# Patient Record
Sex: FEMALE | Race: WHITE | NOT HISPANIC OR LATINO | Employment: FULL TIME | URBAN - METROPOLITAN AREA
[De-identification: names, ages, dates, MRNs, and addresses within clinical notes are randomized per-mention and may not be internally consistent; named-entity substitution may affect disease eponyms.]

---

## 2017-01-11 ENCOUNTER — OFFICE VISIT - HEALTHEAST (OUTPATIENT)
Dept: PODIATRY | Facility: CLINIC | Age: 37
End: 2017-01-11

## 2017-01-11 DIAGNOSIS — G57.60 PLANTAR NERVE LESION, UNSPECIFIED LATERALITY: ICD-10-CM

## 2017-01-18 ENCOUNTER — OFFICE VISIT - HEALTHEAST (OUTPATIENT)
Dept: PODIATRY | Facility: CLINIC | Age: 37
End: 2017-01-18

## 2017-01-18 DIAGNOSIS — G57.60 MORTON'S NEUROMA, UNSPECIFIED LATERALITY: ICD-10-CM

## 2017-01-31 ENCOUNTER — RECORDS - HEALTHEAST (OUTPATIENT)
Dept: ADMINISTRATIVE | Facility: OTHER | Age: 37
End: 2017-01-31

## 2017-02-01 ENCOUNTER — OFFICE VISIT - HEALTHEAST (OUTPATIENT)
Dept: PODIATRY | Facility: CLINIC | Age: 37
End: 2017-02-01

## 2017-02-01 DIAGNOSIS — G57.60 PLANTAR NERVE LESION, UNSPECIFIED LATERALITY: ICD-10-CM

## 2017-02-03 ENCOUNTER — COMMUNICATION - HEALTHEAST (OUTPATIENT)
Dept: FAMILY MEDICINE | Facility: CLINIC | Age: 37
End: 2017-02-03

## 2017-02-03 ENCOUNTER — OFFICE VISIT - HEALTHEAST (OUTPATIENT)
Dept: FAMILY MEDICINE | Facility: CLINIC | Age: 37
End: 2017-02-03

## 2017-02-03 DIAGNOSIS — R39.14 FEELING OF INCOMPLETE BLADDER EMPTYING: ICD-10-CM

## 2017-02-03 DIAGNOSIS — R30.0 DYSURIA: ICD-10-CM

## 2017-02-04 ENCOUNTER — COMMUNICATION - HEALTHEAST (OUTPATIENT)
Dept: FAMILY MEDICINE | Facility: CLINIC | Age: 37
End: 2017-02-04

## 2017-08-24 ENCOUNTER — OFFICE VISIT - HEALTHEAST (OUTPATIENT)
Dept: FAMILY MEDICINE | Facility: CLINIC | Age: 37
End: 2017-08-24

## 2017-08-24 DIAGNOSIS — J02.9 ACUTE PHARYNGITIS: ICD-10-CM

## 2017-10-19 ENCOUNTER — COMMUNICATION - HEALTHEAST (OUTPATIENT)
Dept: FAMILY MEDICINE | Facility: CLINIC | Age: 37
End: 2017-10-19

## 2017-10-19 DIAGNOSIS — J30.9 ALLERGIC RHINITIS: ICD-10-CM

## 2017-10-25 ENCOUNTER — COMMUNICATION - HEALTHEAST (OUTPATIENT)
Dept: FAMILY MEDICINE | Facility: CLINIC | Age: 37
End: 2017-10-25

## 2017-10-25 DIAGNOSIS — J30.9 ALLERGIC RHINITIS: ICD-10-CM

## 2017-10-27 ENCOUNTER — OFFICE VISIT - HEALTHEAST (OUTPATIENT)
Dept: FAMILY MEDICINE | Facility: CLINIC | Age: 37
End: 2017-10-27

## 2017-10-27 DIAGNOSIS — F33.0 MILD EPISODE OF RECURRENT MAJOR DEPRESSIVE DISORDER (H): ICD-10-CM

## 2017-10-27 DIAGNOSIS — Z00.00 ROUTINE GENERAL MEDICAL EXAMINATION AT A HEALTH CARE FACILITY: ICD-10-CM

## 2017-10-27 DIAGNOSIS — B96.89 ACUTE BACTERIAL SINUSITIS: ICD-10-CM

## 2017-10-27 DIAGNOSIS — J01.90 ACUTE BACTERIAL SINUSITIS: ICD-10-CM

## 2017-10-27 DIAGNOSIS — D50.0 IRON DEFICIENCY ANEMIA DUE TO CHRONIC BLOOD LOSS: ICD-10-CM

## 2017-10-27 DIAGNOSIS — J30.89 CHRONIC NONSEASONAL ALLERGIC RHINITIS DUE TO POLLEN: ICD-10-CM

## 2017-10-27 DIAGNOSIS — F41.1 GENERALIZED ANXIETY DISORDER: ICD-10-CM

## 2017-10-27 DIAGNOSIS — J45.20 MILD INTERMITTENT ASTHMA WITHOUT COMPLICATION: ICD-10-CM

## 2017-10-27 ASSESSMENT — MIFFLIN-ST. JEOR: SCORE: 1417.14

## 2017-11-18 ENCOUNTER — COMMUNICATION - HEALTHEAST (OUTPATIENT)
Dept: FAMILY MEDICINE | Facility: CLINIC | Age: 37
End: 2017-11-18

## 2017-11-18 DIAGNOSIS — F41.1 GENERALIZED ANXIETY DISORDER: ICD-10-CM

## 2017-11-18 DIAGNOSIS — F33.9 MAJOR DEPRESSIVE DISORDER, RECURRENT EPISODE (H): ICD-10-CM

## 2017-12-01 ENCOUNTER — OFFICE VISIT - HEALTHEAST (OUTPATIENT)
Dept: FAMILY MEDICINE | Facility: CLINIC | Age: 37
End: 2017-12-01

## 2017-12-01 DIAGNOSIS — Z30.430 ENCOUNTER FOR IUD INSERTION: ICD-10-CM

## 2017-12-01 DIAGNOSIS — N89.8 VAGINAL DISCHARGE: ICD-10-CM

## 2017-12-07 LAB
HPV INTERPRETATION - HISTORICAL: NORMAL
HPV INTERPRETER - HISTORICAL: NORMAL

## 2017-12-09 LAB
BKR LAB AP ABNORMAL BLEEDING: YES
BKR LAB AP BIRTH CONTROL/HORMONES: NORMAL
BKR LAB AP CERVICAL APPEARANCE: NORMAL
BKR LAB AP GYN ADEQUACY: NORMAL
BKR LAB AP GYN INTERPRETATION: NORMAL
BKR LAB AP HPV REFLEX: NORMAL
BKR LAB AP LMP: NORMAL
BKR LAB AP PATIENT STATUS: NORMAL
BKR LAB AP PREVIOUS ABNORMAL: NORMAL
BKR LAB AP PREVIOUS NORMAL: NORMAL
HIGH RISK?: NO
PATH REPORT.COMMENTS IMP SPEC: NORMAL
RESULT FLAG (HE HISTORICAL CONVERSION): NORMAL

## 2017-12-19 ENCOUNTER — COMMUNICATION - HEALTHEAST (OUTPATIENT)
Dept: FAMILY MEDICINE | Facility: CLINIC | Age: 37
End: 2017-12-19

## 2017-12-20 ENCOUNTER — COMMUNICATION - HEALTHEAST (OUTPATIENT)
Dept: FAMILY MEDICINE | Facility: CLINIC | Age: 37
End: 2017-12-20

## 2017-12-20 ENCOUNTER — AMBULATORY - HEALTHEAST (OUTPATIENT)
Dept: FAMILY MEDICINE | Facility: CLINIC | Age: 37
End: 2017-12-20

## 2018-01-26 ENCOUNTER — COMMUNICATION - HEALTHEAST (OUTPATIENT)
Dept: SCHEDULING | Facility: CLINIC | Age: 38
End: 2018-01-26

## 2018-01-26 ENCOUNTER — RECORDS - HEALTHEAST (OUTPATIENT)
Dept: ADMINISTRATIVE | Facility: OTHER | Age: 38
End: 2018-01-26

## 2018-01-29 ENCOUNTER — COMMUNICATION - HEALTHEAST (OUTPATIENT)
Dept: FAMILY MEDICINE | Facility: CLINIC | Age: 38
End: 2018-01-29

## 2018-01-30 ENCOUNTER — COMMUNICATION - HEALTHEAST (OUTPATIENT)
Dept: FAMILY MEDICINE | Facility: CLINIC | Age: 38
End: 2018-01-30

## 2018-02-02 ENCOUNTER — OFFICE VISIT - HEALTHEAST (OUTPATIENT)
Dept: FAMILY MEDICINE | Facility: CLINIC | Age: 38
End: 2018-02-02

## 2018-02-02 DIAGNOSIS — G57.13 MERALGIA PARESTHETICA, BILATERAL LOWER LIMBS: ICD-10-CM

## 2018-02-02 RX ORDER — GABAPENTIN 100 MG/1
100 CAPSULE ORAL 3 TIMES DAILY PRN
Qty: 30 CAPSULE | Refills: 2 | Status: SHIPPED | OUTPATIENT
Start: 2018-02-02 | End: 2021-11-19

## 2018-02-19 ENCOUNTER — OFFICE VISIT - HEALTHEAST (OUTPATIENT)
Dept: FAMILY MEDICINE | Facility: CLINIC | Age: 38
End: 2018-02-19

## 2018-02-19 DIAGNOSIS — J10.1 INFLUENZA B: ICD-10-CM

## 2018-02-19 DIAGNOSIS — Z87.898 HISTORY OF NAUSEA: ICD-10-CM

## 2018-02-19 DIAGNOSIS — R06.02 SOB (SHORTNESS OF BREATH): ICD-10-CM

## 2018-02-19 DIAGNOSIS — J45.901 ASTHMA EXACERBATION: ICD-10-CM

## 2018-02-19 DIAGNOSIS — R07.0 THROAT PAIN: ICD-10-CM

## 2018-02-19 DIAGNOSIS — R50.9 FEVER: ICD-10-CM

## 2018-02-19 LAB
DEPRECATED S PYO AG THROAT QL EIA: NORMAL
FLUAV AG SPEC QL IA: ABNORMAL
FLUBV AG SPEC QL IA: ABNORMAL

## 2018-02-20 LAB — GROUP A STREP BY PCR: NORMAL

## 2018-02-25 ENCOUNTER — OFFICE VISIT - HEALTHEAST (OUTPATIENT)
Dept: FAMILY MEDICINE | Facility: CLINIC | Age: 38
End: 2018-02-25

## 2018-02-25 DIAGNOSIS — R09.89 SINUS COMPLAINT: ICD-10-CM

## 2018-03-08 ENCOUNTER — COMMUNICATION - HEALTHEAST (OUTPATIENT)
Dept: TELEHEALTH | Facility: CLINIC | Age: 38
End: 2018-03-08

## 2018-03-23 ENCOUNTER — OFFICE VISIT - HEALTHEAST (OUTPATIENT)
Dept: FAMILY MEDICINE | Facility: CLINIC | Age: 38
End: 2018-03-23

## 2018-03-23 DIAGNOSIS — H92.02 LEFT EAR PAIN: ICD-10-CM

## 2018-03-29 ENCOUNTER — COMMUNICATION - HEALTHEAST (OUTPATIENT)
Dept: TELEHEALTH | Facility: CLINIC | Age: 38
End: 2018-03-29

## 2018-09-26 ENCOUNTER — COMMUNICATION - HEALTHEAST (OUTPATIENT)
Dept: FAMILY MEDICINE | Facility: CLINIC | Age: 38
End: 2018-09-26

## 2018-09-27 ENCOUNTER — AMBULATORY - HEALTHEAST (OUTPATIENT)
Dept: LAB | Facility: CLINIC | Age: 38
End: 2018-09-27

## 2018-09-27 ENCOUNTER — OFFICE VISIT - HEALTHEAST (OUTPATIENT)
Dept: FAMILY MEDICINE | Facility: CLINIC | Age: 38
End: 2018-09-27

## 2018-09-27 DIAGNOSIS — Z30.431 IUD CHECK UP: ICD-10-CM

## 2018-09-27 DIAGNOSIS — R19.7 DIARRHEA: ICD-10-CM

## 2018-09-27 DIAGNOSIS — R21 RASH: ICD-10-CM

## 2018-09-27 DIAGNOSIS — N93.9 VAGINAL BLEEDING: ICD-10-CM

## 2018-09-27 LAB
ANION GAP SERPL CALCULATED.3IONS-SCNC: 11 MMOL/L (ref 5–18)
BUN SERPL-MCNC: 9 MG/DL (ref 8–22)
CALCIUM SERPL-MCNC: 10.2 MG/DL (ref 8.5–10.5)
CHLORIDE BLD-SCNC: 104 MMOL/L (ref 98–107)
CLUE CELLS: NORMAL
CO2 SERPL-SCNC: 24 MMOL/L (ref 22–31)
CREAT SERPL-MCNC: 0.68 MG/DL (ref 0.6–1.1)
ERYTHROCYTE [DISTWIDTH] IN BLOOD BY AUTOMATED COUNT: 12.2 % (ref 11–14.5)
GFR SERPL CREATININE-BSD FRML MDRD: >60 ML/MIN/1.73M2
GLUCOSE BLD-MCNC: 92 MG/DL (ref 70–125)
HCG UR QL: NEGATIVE
HCT VFR BLD AUTO: 41.2 % (ref 35–47)
HGB BLD-MCNC: 13.5 G/DL (ref 12–16)
MCH RBC QN AUTO: 28.7 PG (ref 27–34)
MCHC RBC AUTO-ENTMCNC: 32.9 G/DL (ref 32–36)
MCV RBC AUTO: 87 FL (ref 80–100)
PLATELET # BLD AUTO: 296 THOU/UL (ref 140–440)
PMV BLD AUTO: 7.4 FL (ref 7–10)
POTASSIUM BLD-SCNC: 4.6 MMOL/L (ref 3.5–5)
RBC # BLD AUTO: 4.71 MILL/UL (ref 3.8–5.4)
SODIUM SERPL-SCNC: 139 MMOL/L (ref 136–145)
SP GR UR STRIP: 1.01 (ref 1–1.03)
TRICHOMONAS, WET PREP: NORMAL
WBC: 7.5 THOU/UL (ref 4–11)
YEAST, WET PREP: NORMAL

## 2018-09-27 RX ORDER — FEXOFENADINE HCL 180 MG/1
180 TABLET ORAL DAILY
Status: SHIPPED | COMMUNITY
Start: 2018-09-06 | End: 2021-11-19

## 2018-09-28 LAB
G LAMBLIA AG STL QL IA: NORMAL
SHIGA TOXIN 1: NEGATIVE
SHIGA TOXIN 2: NEGATIVE

## 2018-09-30 LAB — BACTERIA SPEC CULT: NORMAL

## 2018-10-31 ENCOUNTER — OFFICE VISIT - HEALTHEAST (OUTPATIENT)
Dept: FAMILY MEDICINE | Facility: CLINIC | Age: 38
End: 2018-10-31

## 2018-10-31 DIAGNOSIS — J01.00 ACUTE MAXILLARY SINUSITIS, RECURRENCE NOT SPECIFIED: ICD-10-CM

## 2018-10-31 DIAGNOSIS — J20.9 ACUTE BRONCHITIS: ICD-10-CM

## 2018-12-21 ENCOUNTER — OFFICE VISIT - HEALTHEAST (OUTPATIENT)
Dept: FAMILY MEDICINE | Facility: CLINIC | Age: 38
End: 2018-12-21

## 2018-12-21 DIAGNOSIS — J01.91 ACUTE RECURRENT SINUSITIS, UNSPECIFIED LOCATION: ICD-10-CM

## 2019-01-17 ENCOUNTER — COMMUNICATION - HEALTHEAST (OUTPATIENT)
Dept: FAMILY MEDICINE | Facility: CLINIC | Age: 39
End: 2019-01-17

## 2019-01-17 DIAGNOSIS — J30.89 CHRONIC NONSEASONAL ALLERGIC RHINITIS DUE TO POLLEN: ICD-10-CM

## 2019-02-15 ENCOUNTER — OFFICE VISIT - HEALTHEAST (OUTPATIENT)
Dept: FAMILY MEDICINE | Facility: CLINIC | Age: 39
End: 2019-02-15

## 2019-02-15 DIAGNOSIS — J02.9 VIRAL PHARYNGITIS: ICD-10-CM

## 2019-02-15 DIAGNOSIS — R07.0 THROAT PAIN: ICD-10-CM

## 2019-02-15 DIAGNOSIS — A08.4 VIRAL GASTROENTERITIS: ICD-10-CM

## 2019-02-15 LAB — DEPRECATED S PYO AG THROAT QL EIA: NORMAL

## 2019-02-16 LAB — GROUP A STREP BY PCR: NORMAL

## 2019-02-24 ENCOUNTER — RECORDS - HEALTHEAST (OUTPATIENT)
Dept: ADMINISTRATIVE | Facility: OTHER | Age: 39
End: 2019-02-24

## 2019-03-04 ENCOUNTER — OFFICE VISIT - HEALTHEAST (OUTPATIENT)
Dept: FAMILY MEDICINE | Facility: CLINIC | Age: 39
End: 2019-03-04

## 2019-03-04 DIAGNOSIS — J01.90 ACUTE SINUSITIS, RECURRENCE NOT SPECIFIED, UNSPECIFIED LOCATION: ICD-10-CM

## 2019-03-19 ENCOUNTER — COMMUNICATION - HEALTHEAST (OUTPATIENT)
Dept: FAMILY MEDICINE | Facility: CLINIC | Age: 39
End: 2019-03-19

## 2019-03-19 DIAGNOSIS — F41.1 GENERALIZED ANXIETY DISORDER: ICD-10-CM

## 2019-03-19 DIAGNOSIS — F33.0 MILD EPISODE OF RECURRENT MAJOR DEPRESSIVE DISORDER (H): ICD-10-CM

## 2019-03-25 ENCOUNTER — RECORDS - HEALTHEAST (OUTPATIENT)
Dept: ADMINISTRATIVE | Facility: OTHER | Age: 39
End: 2019-03-25

## 2019-04-09 ENCOUNTER — COMMUNICATION - HEALTHEAST (OUTPATIENT)
Dept: FAMILY MEDICINE | Facility: CLINIC | Age: 39
End: 2019-04-09

## 2019-04-09 DIAGNOSIS — Z30.430 ENCOUNTER FOR IUD INSERTION: ICD-10-CM

## 2019-05-10 ENCOUNTER — RECORDS - HEALTHEAST (OUTPATIENT)
Dept: GENERAL RADIOLOGY | Facility: CLINIC | Age: 39
End: 2019-05-10

## 2019-05-10 ENCOUNTER — OFFICE VISIT - HEALTHEAST (OUTPATIENT)
Dept: FAMILY MEDICINE | Facility: CLINIC | Age: 39
End: 2019-05-10

## 2019-05-10 DIAGNOSIS — Z12.4 CERVICAL CANCER SCREENING: ICD-10-CM

## 2019-05-10 DIAGNOSIS — M54.2 CERVICALGIA: ICD-10-CM

## 2019-05-10 DIAGNOSIS — M79.7 FIBROMYALGIA: ICD-10-CM

## 2019-05-10 DIAGNOSIS — D50.0 IRON DEFICIENCY ANEMIA DUE TO CHRONIC BLOOD LOSS: ICD-10-CM

## 2019-05-10 DIAGNOSIS — Z00.00 ROUTINE GENERAL MEDICAL EXAMINATION AT A HEALTH CARE FACILITY: ICD-10-CM

## 2019-05-10 DIAGNOSIS — M54.2 NECK PAIN: ICD-10-CM

## 2019-05-10 DIAGNOSIS — J45.20 MILD INTERMITTENT ASTHMA WITHOUT COMPLICATION: ICD-10-CM

## 2019-05-10 DIAGNOSIS — N92.0 MENORRHAGIA WITH REGULAR CYCLE: ICD-10-CM

## 2019-05-10 LAB — TSH SERPL DL<=0.005 MIU/L-ACNC: 0.85 UIU/ML (ref 0.3–5)

## 2019-05-14 LAB
HPV SOURCE: NORMAL
HUMAN PAPILLOMA VIRUS 16 DNA: NEGATIVE
HUMAN PAPILLOMA VIRUS 18 DNA: NEGATIVE
HUMAN PAPILLOMA VIRUS FINAL DIAGNOSIS: NORMAL
HUMAN PAPILLOMA VIRUS OTHER HR: NEGATIVE
SPECIMEN DESCRIPTION: NORMAL

## 2019-05-20 LAB
BKR LAB AP ABNORMAL BLEEDING: NORMAL
BKR LAB AP BIRTH CONTROL/HORMONES: NORMAL
BKR LAB AP CERVICAL APPEARANCE: NORMAL
BKR LAB AP GYN ADEQUACY: NORMAL
BKR LAB AP GYN INTERPRETATION: NORMAL
BKR LAB AP HPV REFLEX: NORMAL
BKR LAB AP LMP: NORMAL
BKR LAB AP PATIENT STATUS: NORMAL
BKR LAB AP PREVIOUS ABNORMAL: NORMAL
BKR LAB AP PREVIOUS NORMAL: NORMAL
HIGH RISK?: NO
PATH REPORT.COMMENTS IMP SPEC: NORMAL
RESULT FLAG (HE HISTORICAL CONVERSION): NORMAL

## 2019-05-23 ENCOUNTER — OFFICE VISIT - HEALTHEAST (OUTPATIENT)
Dept: PHYSICAL THERAPY | Facility: REHABILITATION | Age: 39
End: 2019-05-23

## 2019-05-23 DIAGNOSIS — M54.2 CERVICALGIA: ICD-10-CM

## 2019-05-30 ENCOUNTER — OFFICE VISIT - HEALTHEAST (OUTPATIENT)
Dept: PHYSICAL THERAPY | Facility: REHABILITATION | Age: 39
End: 2019-05-30

## 2019-05-30 DIAGNOSIS — M54.2 CERVICALGIA: ICD-10-CM

## 2019-05-31 ENCOUNTER — RECORDS - HEALTHEAST (OUTPATIENT)
Dept: ADMINISTRATIVE | Facility: OTHER | Age: 39
End: 2019-05-31

## 2019-06-01 ENCOUNTER — OFFICE VISIT - HEALTHEAST (OUTPATIENT)
Dept: FAMILY MEDICINE | Facility: CLINIC | Age: 39
End: 2019-06-01

## 2019-06-01 DIAGNOSIS — J01.41 ACUTE RECURRENT PANSINUSITIS: ICD-10-CM

## 2019-06-04 ENCOUNTER — OFFICE VISIT - HEALTHEAST (OUTPATIENT)
Dept: PHYSICAL THERAPY | Facility: REHABILITATION | Age: 39
End: 2019-06-04

## 2019-06-04 DIAGNOSIS — M54.2 CERVICALGIA: ICD-10-CM

## 2019-06-06 ENCOUNTER — OFFICE VISIT - HEALTHEAST (OUTPATIENT)
Dept: PHYSICAL THERAPY | Facility: REHABILITATION | Age: 39
End: 2019-06-06

## 2019-06-06 DIAGNOSIS — M54.2 CERVICALGIA: ICD-10-CM

## 2019-06-13 ENCOUNTER — OFFICE VISIT - HEALTHEAST (OUTPATIENT)
Dept: PHYSICAL THERAPY | Facility: REHABILITATION | Age: 39
End: 2019-06-13

## 2019-06-13 DIAGNOSIS — M54.2 CERVICALGIA: ICD-10-CM

## 2019-06-20 ENCOUNTER — OFFICE VISIT - HEALTHEAST (OUTPATIENT)
Dept: PHYSICAL THERAPY | Facility: REHABILITATION | Age: 39
End: 2019-06-20

## 2019-06-20 DIAGNOSIS — M54.2 CERVICALGIA: ICD-10-CM

## 2019-06-28 ENCOUNTER — OFFICE VISIT - HEALTHEAST (OUTPATIENT)
Dept: FAMILY MEDICINE | Facility: CLINIC | Age: 39
End: 2019-06-28

## 2019-06-28 DIAGNOSIS — I49.3 PVC (PREMATURE VENTRICULAR CONTRACTION): ICD-10-CM

## 2019-06-28 DIAGNOSIS — N92.0 MENORRHAGIA WITH REGULAR CYCLE: ICD-10-CM

## 2019-06-28 DIAGNOSIS — D50.0 IRON DEFICIENCY ANEMIA DUE TO CHRONIC BLOOD LOSS: ICD-10-CM

## 2019-06-28 DIAGNOSIS — Z01.818 PREOPERATIVE EXAMINATION: ICD-10-CM

## 2019-06-28 LAB
ANION GAP SERPL CALCULATED.3IONS-SCNC: 10 MMOL/L (ref 5–18)
BUN SERPL-MCNC: 11 MG/DL (ref 8–22)
CALCIUM SERPL-MCNC: 9.9 MG/DL (ref 8.5–10.5)
CHLORIDE BLD-SCNC: 106 MMOL/L (ref 98–107)
CO2 SERPL-SCNC: 24 MMOL/L (ref 22–31)
CREAT SERPL-MCNC: 0.69 MG/DL (ref 0.6–1.1)
ERYTHROCYTE [DISTWIDTH] IN BLOOD BY AUTOMATED COUNT: 11.2 % (ref 11–14.5)
GFR SERPL CREATININE-BSD FRML MDRD: >60 ML/MIN/1.73M2
GLUCOSE BLD-MCNC: 82 MG/DL (ref 70–125)
HCT VFR BLD AUTO: 39.9 % (ref 35–47)
HGB BLD-MCNC: 13.4 G/DL (ref 12–16)
MCH RBC QN AUTO: 29.5 PG (ref 27–34)
MCHC RBC AUTO-ENTMCNC: 33.6 G/DL (ref 32–36)
MCV RBC AUTO: 88 FL (ref 80–100)
PLATELET # BLD AUTO: 276 THOU/UL (ref 140–440)
PMV BLD AUTO: 6.9 FL (ref 7–10)
POTASSIUM BLD-SCNC: 4.3 MMOL/L (ref 3.5–5)
RBC # BLD AUTO: 4.54 MILL/UL (ref 3.8–5.4)
SODIUM SERPL-SCNC: 140 MMOL/L (ref 136–145)
WBC: 5.8 THOU/UL (ref 4–11)

## 2019-07-17 ENCOUNTER — RECORDS - HEALTHEAST (OUTPATIENT)
Dept: ADMINISTRATIVE | Facility: OTHER | Age: 39
End: 2019-07-17

## 2019-08-02 ENCOUNTER — RECORDS - HEALTHEAST (OUTPATIENT)
Dept: ADMINISTRATIVE | Facility: OTHER | Age: 39
End: 2019-08-02

## 2019-08-08 ENCOUNTER — RECORDS - HEALTHEAST (OUTPATIENT)
Dept: ADMINISTRATIVE | Facility: OTHER | Age: 39
End: 2019-08-08

## 2019-09-19 ENCOUNTER — COMMUNICATION - HEALTHEAST (OUTPATIENT)
Dept: FAMILY MEDICINE | Facility: CLINIC | Age: 39
End: 2019-09-19

## 2019-09-24 LAB
ATRIAL RATE - MUSE: 72 BPM
DIASTOLIC BLOOD PRESSURE - MUSE: NORMAL
INTERPRETATION ECG - MUSE: NORMAL
P AXIS - MUSE: -28 DEGREES
PR INTERVAL - MUSE: 140 MS
QRS DURATION - MUSE: 64 MS
QT - MUSE: 372 MS
QTC - MUSE: 407 MS
R AXIS - MUSE: 48 DEGREES
SYSTOLIC BLOOD PRESSURE - MUSE: NORMAL
T AXIS - MUSE: 44 DEGREES
VENTRICULAR RATE- MUSE: 72 BPM

## 2019-10-09 ENCOUNTER — OFFICE VISIT - HEALTHEAST (OUTPATIENT)
Dept: FAMILY MEDICINE | Facility: CLINIC | Age: 39
End: 2019-10-09

## 2019-10-09 DIAGNOSIS — R07.0 THROAT PAIN: ICD-10-CM

## 2019-10-09 LAB — DEPRECATED S PYO AG THROAT QL EIA: NORMAL

## 2019-10-09 ASSESSMENT — PATIENT HEALTH QUESTIONNAIRE - PHQ9: SUM OF ALL RESPONSES TO PHQ QUESTIONS 1-9: 0

## 2019-10-10 LAB — GROUP A STREP BY PCR: NORMAL

## 2019-10-24 ENCOUNTER — COMMUNICATION - HEALTHEAST (OUTPATIENT)
Dept: FAMILY MEDICINE | Facility: CLINIC | Age: 39
End: 2019-10-24

## 2019-10-24 DIAGNOSIS — J45.20 MILD INTERMITTENT ASTHMA WITHOUT COMPLICATION: ICD-10-CM

## 2020-01-20 ENCOUNTER — COMMUNICATION - HEALTHEAST (OUTPATIENT)
Dept: FAMILY MEDICINE | Facility: CLINIC | Age: 40
End: 2020-01-20

## 2020-01-30 ENCOUNTER — COMMUNICATION - HEALTHEAST (OUTPATIENT)
Dept: FAMILY MEDICINE | Facility: CLINIC | Age: 40
End: 2020-01-30

## 2020-01-30 DIAGNOSIS — J30.89 CHRONIC NONSEASONAL ALLERGIC RHINITIS DUE TO POLLEN: ICD-10-CM

## 2020-02-19 ENCOUNTER — COMMUNICATION - HEALTHEAST (OUTPATIENT)
Dept: FAMILY MEDICINE | Facility: CLINIC | Age: 40
End: 2020-02-19

## 2020-02-19 DIAGNOSIS — B37.31 YEAST INFECTION OF THE VAGINA: ICD-10-CM

## 2020-03-13 ENCOUNTER — VIRTUAL VISIT (OUTPATIENT)
Dept: FAMILY MEDICINE | Facility: OTHER | Age: 40
End: 2020-03-13

## 2020-03-14 ENCOUNTER — OFFICE VISIT - HEALTHEAST (OUTPATIENT)
Dept: FAMILY MEDICINE | Facility: CLINIC | Age: 40
End: 2020-03-14

## 2020-03-14 DIAGNOSIS — J01.90 ACUTE BACTERIAL SINUSITIS: ICD-10-CM

## 2020-03-14 DIAGNOSIS — B96.89 ACUTE BACTERIAL SINUSITIS: ICD-10-CM

## 2020-03-14 NOTE — PROGRESS NOTES
"Date: 2020 13:06:34  Clinician: Qasim Hay  Clinician NPI: [id number]  Patient: Sandyha Caal  Patient : 1980  Patient Address: [address] [place] [address]  Patient Phone: [phone]  Visit Protocol: URI  Patient Summary:  Sandhya is a 40 year old ( : 1980 ) female who initiated a Visit for cold, sinus infection, or influenza. When asked the question \"Please sign me up to receive news, health information and promotions. \", Sandhya responded \"No\".    Sandhya states her symptoms started gradually 1 month or more ago. After her symptoms started, they improved and then got worse again.   Her symptoms consist of a sore throat, nasal congestion, ear pain, malaise, a headache, enlarged lymph nodes, facial pain or pressure, and myalgia. She is experiencing mild difficulty breathing with activities but can speak normally in full sentences.   Symptom details     Nasal secretions: The color of her mucus is clear.    Sore throat: Sandhya reports having mild throat pain (1-3 on a 10 point pain scale), does not have exudate on her tonsils, and can swallow liquids. The lymph nodes in her neck are enlarged. A rash has appeared on the skin since the sore throat started. Sandhya uploaded photos of her rash (see below).     Facial pain or pressure: She has not had the facial pain or pressure for 12 weeks or more. The facial pain or pressure does not feel worse when bending or leaning forward.     Headache: Sandhya has not had the headache for 12 weeks or more. She states the headache is mild (1-3 on a 10 point pain scale).      Sandhya denies having chills, wheezing, cough, fever, teeth pain, and rhinitis. She also denies taking antibiotic medication for the symptoms and having recent facial or sinus surgery in the past 60 days.   Precipitating events  Within the past week, Sandhya has not been exposed to someone with strep throat. She has not recently been exposed to someone with influenza. " Sandhya has been in close contact with the following high risk individuals: adults 65 or older.   Pertinent COVID-19 (Coronavirus) information  Sandhya has not traveled internationally or to the areas where COVID-19 (Coronavirus) is widespread in the last 14 days before the start of her symptoms.   Sandhya has not had close contact with a laboratory-confirmed positive COVID-19 patient or someone under quarantine for suspected COVID-19 within 14 days of symptom onset.   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   Pertinent medical history  Sandhya had 2 sinus infections within the past year.   Sandhya typically gets a yeast infection when she takes antibiotics. She has used fluconazole (Diflucan) to treat previous yeast infections. 1 dose of fluconazole (Diflucan) has typically been sufficient for symptoms to resolve in the past.   Sandhya needs a return to work/school note.   Weight: 165 lbs   Sandhya does not smoke or use smokeless tobacco.   She denies pregnancy and denies breastfeeding. She does not menstruate.   Additional information as reported by the patient (free text): I do not have a fever but I do have some pain that feels like it's in my lungs/chest. I had presumptive influenza B in Jan. Ever since I've been having bloody and green discharge from my sinuses. It is now clear after having used a sinus  religiously. I am afraid this has moved into my lungs. I'm also having residual ear pain and fatigue. I would also like to avoid walk-in  care if possible due to COVID-19 but I understand that a chest exam/x-ray may be indicated. Please advise. Thanks   Weight: 165 lbs    MEDICATIONS: Allegra-D 24 Hour oral, Ventolin HFA inhalation, Flonase Allergy Relief nasal, Singulair oral, Qvar RediHaler inhalation, sertraline oral, ALLERGIES: morphine, theophylline  Clinician Response:  Dear Sandhya,   Dear Sandhya,  Based on the information you have provided, it does not appear you  need Coronavirus (COVID-19) testing. &nbsp;Agree&nbsp;that you need someone to listen to your lung and check your ear and possibly do an cxr. sorry the&nbsp;delay&nbsp;in getting back to you. we are&nbsp;doing&nbsp;this in part to try to keep the walk in cares and urgent cares COVID free. &nbsp;We feel an in-person visit with a provider is more appropriate for your condition based on your interview. We'd be honored to see you in one of our [facility]/&nbsp;healthiest&nbsp;clinics or urgent cares.&nbsp;  We request that you bring documentation of your completed [facility] visit to your clinic appointment. Failure to do so may result in a request to repeat your [facility] visit. Documentation could include a printout from your visit or show the  the completed visit on your phone.  Why no testing?  At this time, we recommend testing primarily for those people who have typical symptoms of cough and fever and have either traveled to a known high risk area of infection or who have been exposed to someone with Coronavirus by close contact.   For more information about COVID19 and options for caring for yourself at home, please visit the CDC website at https://www.cdc.gov/coronavirus/2019-ncov/about/steps-when-sick.html   For more options for care at [facility], please visit our website at [email or web address]     COVID-19 (Coronavirus) General Information  With the increase in the number of COVID-19 (Coronavirus) cases, we understand you may have some questions. Below is some helpful information on COVID-19 (Coronavirus).  How can I protect myself and others from the COVID-19 (Coronavirus)?  Because there is currently no vaccine to prevent infection, the best way to protect yourself is to avoid being exposed to this virus. Put distance between yourself and other people if COVID-19 (Coronavirus) is spreading in your community. The virus is thought to spread mainly from  person-to-person.     Between people who are in close contact with one another (within about 6 about) for prolonged period (10 minutes or longer).    Through respiratory droplets produced when an infected person coughs or sneezes.     The CDC recommends the following additional steps to protect yourself and others:     Wash your hands often with soap and water for at least 20 seconds, especially after blowing your nose, coughing, or sneezing; going to the bathroom; and before eating or preparing food.  Use an alcohol-based hand  that contains at least 60 percent alcohol if soap and water are not available.        Avoid touching your eyes, nose and mouth with unwashed hands.    Avoid close contact with people who are sick.    Stay home when you are sick.    Cover your cough or sneeze with a tissue, then throw the tissue in the trash.    Clean and disinfect frequently touched objects and surfaces.     You can help stop COVID-19 (Coronavirus) by knowing the signs and symptoms:     Fever    Cough    Shortness of breath     Contact your healthcare provider if   Develop symptoms   AND   Have been in close contact with a person known to have COVID-19 (Coronavirus) or live in or have recently traveled from an area with ongoing spread of COVID-19 (Coronavirus). Call ahead before you go to a doctor's office or emergency room. Tell them about your recent travel and your symptoms.   For the most up to date information, visit the CDC's website.  Steps to help prevent the spread of COVID-19 (Coronavirus) if you are sick  If you are sick with COVID-19 (Coronavirus) or suspect you are infected with the virus that causes COVID-19 (Coronavirus), follow the steps below to help prevent the disease from spreading&nbsp;to people in your home and community.     Stay home except to get medical care. Home isolation may be started in consultation with your healthcare clinician.    Separate yourself from other people and animals in  "your home.    Call ahead before visiting your doctor if you have a medical appointment.    Wear a facemask when you are around other people.    Cover your cough and sneezes.    Clean your hands often.    Avoid sharing personal household items.    Clean and disinfect frequently touched objects and surfaces everyday.    You will need to have someone drop off medications or household supplies (if needed) at your house without coming inside or in contact with you or others living in your house.    Monitor your symptoms and seek prompt medical care if your illness is worsening (e.g. Difficulty breathing).    Discontinue home isolation only in consultation with your healthcare provider.     For more detailed and up to date information on what to do if you are sick, visit this link: What to Do If You Are Sick With Coronavirus Disease 2019 (COVID-19).  Do I need to be tested for COVID-19 (Coronavirus)?     At this time, the limited number of tests available are controlled by the state and local health departments and are being reserved for more seriously ill patients, those with known exposure to confirmed patients, and those with recent travel (within 14 days) to countries with high rates of COVID-19 (Coronavirus).    Decisions on which patients receive testing will be based on the local spread of COVID-19 (Coronavirus) as well as the symptoms. Your healthcare provider will make the final decision on whether you should be tested.    In the meantime, if you have concerns that you may have been exposed, it is reasonable to practice \"social distancing.\"&nbsp; If you are ill with a cold or flu-like illness, please monitor your symptoms and reach out to your healthcare provider if your symptoms worsen.    For more up to date information, visit this link: COVID-19 (Coronavirus) Frequently Asked Questions and Answers.      Diagnosis: Cough  Diagnosis ICD: R05  "

## 2020-04-04 ENCOUNTER — VIRTUAL VISIT (OUTPATIENT)
Dept: FAMILY MEDICINE | Facility: OTHER | Age: 40
End: 2020-04-04

## 2020-04-05 NOTE — PROGRESS NOTES
"Date: 2020 20:17:57  Clinician: Jason Miranda  Clinician NPI: [id number]  Patient: Sandhya Caal  Patient : 1980  Patient Address: [address] [place], [place] [address]  Patient Phone: [phone]  Visit Protocol: URI  Patient Summary:  Sandhya is a 40 year old ( : 1980 ) female who initiated a Visit for COVID-19 (Coronavirus) evaluation and screening. When asked the question \"Please sign me up to receive news, health information and promotions. \", Sandhya responded \"No\".    Sandhya states her symptoms started today.   Her symptoms consist of myalgia, a sore throat, a cough, nasal congestion, malaise, enlarged lymph nodes, chills, nausea, and a headache. She is experiencing mild difficulty breathing with activities but can speak normally in full sentences. Sandhya also feels feverish.   Symptom details     Nasal secretions: The color of her mucus is white.    Cough: Sandhya coughs a few times an hour and her cough is not more bothersome at night. Phlegm comes into her throat when she coughs. She does not believe her cough is caused by post-nasal drip. The color of the phlegm is clear.     Sore throat: Sandhya reports having mild throat pain (1-3 on a 10 point pain scale), does not have exudate on her tonsils, and can swallow liquids. The lymph nodes in her neck are enlarged. A rash has not appeared on the skin since the sore throat started.     Temperature: Her current temperature is 99.5 degrees Fahrenheit.     Headache: She states the headache is mild (1-3 on a 10 point pain scale).      Sandhya denies having rhinitis, facial pain or pressure, ear pain, diarrhea, vomiting, teeth pain, and wheezing. She also denies having recent facial or sinus surgery in the past 60 days.   Precipitating events  Within the past week, Sandhya has not been exposed to someone with strep throat. She has not recently been exposed to someone with influenza. Sandhya has not been in close contact with any high " risk individuals.   Pertinent COVID-19 (Coronavirus) information  Sandhya has not traveled internationally or to the areas where COVID-19 (Coronavirus) is widespread, including cruise ship travel in the last 14 days before the start of her symptoms.   Sandhya has not had a close contact with a laboratory-confirmed COVID-19 patient within 14 days of symptom onset. She also has not had a close contact with a suspected COVID-19 patient within 14 days of symptom onset.   Sandhya does not work or volunteer as healthcare worker or a  and does not work or volunteer in a healthcare facility. She does not live with a healthcare worker.   Pertinent medical history  Sandhya had 3 sinus infections within the past year.   Sandhya has taken an antibiotic medication in the past month. Antibiotic details as reported by the patient (free text): Doxycycline for a sinus infection. March 14,2020   Sandhya typically gets a yeast infection when she takes antibiotics. She has used fluconazole (Diflucan) to treat previous yeast infections. 1 dose of fluconazole (Diflucan) has typically been sufficient for symptoms to resolve in the past.   Sandhya needs a return to work/school note.   Weight: 164 lbs   Sandhya does not smoke or use smokeless tobacco.   She denies pregnancy and denies breastfeeding. She has menstruated in the past month.   Additional information as reported by the patient (free text): I have mild intermittent asthma. I work as a  but haven't been in patient care areas since March 11th.   Weight: 164 lbs    MEDICATIONS: Allegra-D 24 Hour oral, Ventolin HFA inhalation, Flonase Allergy Relief nasal, Singulair oral, Qvar RediHaler inhalation, sertraline oral, ALLERGIES: theophylline, morphine  Clinician Response:  Dear Sandhya,   Based on the information you have provided, you do have symptoms that are consistent with Coronavirus (COVID-19).  Please continue to use your inhaler regularly to help with  any symptoms which may worsen your asthma.&nbsp;  The coronavirus causes mild to severe respiratory illness with the most common symptoms including fever, cough and difficulty breathing. Unfortunately, many viruses cause similar symptoms and it can be difficult to distinguish between viruses, especially in mild cases, so we are presuming that anyone with cough or fever has coronavirus at this time.  Coronavirus/COVID-19 has reached the point of community spread in [place], meaning that we are finding the virus in people with no known exposure risk for keyon the virus. Given the increasing commonness of coronavirus in the community we are no longer testing patients who are not critically ill.  If you are a health care worker, you should refer to your employee health office for instructions about testing and returning to work.  For everyone else who has cough or fever, you should assume you are infected with coronavirus. Since you will not be tested but have symptoms that may be consistent with coronavirus, the CDC recommends you stay in self-isolation until these three things have happened:    You have had no fever for at least 72 hours (that is three full days of no fever without the use of medicine that reduces fevers)    AND   Other symptoms have improved (for example, when your cough or shortness of breath have improved)   AND   At least 7 days have passed since your symptoms first appeared.   How to Isolate:   Isolate yourself at home.  Do Not allow any visitors  Do Not go to work or school  Do Not go to [employer],  centers, shopping, or other public places.  Do Not shake hands.  Avoid close contact with others (hugging, kissing).   Protect Others:   Cover Your Mouth and Nose with a mask, disposable tissue or wash cloth to avoid spreading germs to others.  Wash your hands and face frequently with soap and water.   We know it can be scary to hear that you might have COVID-19. Our team can help  track your symptoms and make sure you are doing ok over the next two weeks using a program called [employer] to keep in touch. When you receive an email from [employer], please consider enrolling in our monitoring program. There is no cost to you for monitoring. Here is a URL where you can learn more: [email or web address]  Managing Symptoms:   At this time, we primarily recommend Tylenol (Acetaminophen) for fever or pain. If you have liver or kidney problems, contact your primary care provider for instructions on use of tylenol. Adults can take 650 mg (two 325 mg pills) by mouth every 4-6 hours as needed OR 1,000 mg (two 500 mg pills) every 8 hours as needed. MAXIMUM DAILY DOSE: 3,000mg. For children, refer to dosing on bottle based on age or weight.   If you develop significant shortness of breath that prevents you from doing normal activities, please call 911 or proceed to the nearest emergency room and alert them immediately that you have been in self-isolation for possible coronavirus.  If you have a higher risk medical condition such as cancer, heart failure, end stage renal disease on dialysis or have a transplant, please reach out to your specialist's clinic to advise them of your [facility] visit should you not improve within the next two days.   For more information about COVID19 and options for caring for yourself at home, please visit the CDC website at https://www.cdc.gov/coronavirus/2019-ncov/about/steps-when-sick.htmlFor more options for care at [facility], please visit our website at [email or web address]    Diagnosis: Cough  Diagnosis ICD: R05

## 2020-05-21 ENCOUNTER — COMMUNICATION - HEALTHEAST (OUTPATIENT)
Dept: FAMILY MEDICINE | Facility: CLINIC | Age: 40
End: 2020-05-21

## 2020-05-21 DIAGNOSIS — F33.0 MILD EPISODE OF RECURRENT MAJOR DEPRESSIVE DISORDER (H): ICD-10-CM

## 2020-05-21 DIAGNOSIS — F41.1 GENERALIZED ANXIETY DISORDER: ICD-10-CM

## 2020-05-27 ENCOUNTER — VIRTUAL VISIT (OUTPATIENT)
Dept: FAMILY MEDICINE | Facility: OTHER | Age: 40
End: 2020-05-27

## 2020-05-27 NOTE — PROGRESS NOTES
"Date: 2020 17:59:03  Clinician: Palmer Pastor  Clinician NPI: [id number]  Patient: Sandhya Caal  Patient : 1980  Patient Address: [address] [place] [address]  Patient Phone: [phone]  Visit Protocol: URI  Patient Summary:  Sandhya is a 40 year old ( : 1980 ) female who initiated a Visit for cold, sinus infection, or influenza. When asked the question \"Please sign me up to receive news, health information and promotions. \", Sandhya responded \"No\".    Sandhya states her symptoms started gradually 5-6 days ago.   Her symptoms consist of a sore throat, enlarged lymph nodes, malaise, a cough, and chills.   Symptom details     Cough: Sandhya coughs a few times an hour and her cough is not more bothersome at night. Phlegm comes into her throat when she coughs. She believes her cough is caused by post-nasal drip. The color of the phlegm is green and yellow.     Sore throat: Sandhya reports having moderate throat pain (4-6 on a 10 point pain scale), does not have exudate on her tonsils, and can swallow liquids. The lymph nodes in her neck are enlarged. A rash has not appeared on the skin since the sore throat started.      Sandhya denies having wheezing, nausea, teeth pain, ageusia, diarrhea, myalgias, anosmia, facial pain or pressure, fever, nasal congestion, vomiting, rhinitis, ear pain, and headache. She also denies having recent facial or sinus surgery in the past 60 days, double sickening (worsening symptoms after initial improvement), and taking antibiotic medication for the symptoms. She is not experiencing dyspnea.   Precipitating events  Within the past week, Sandhya has not been exposed to someone with strep throat. She has not recently been exposed to someone with influenza. Sandhya has not been in close contact with any high risk individuals.   Pertinent COVID-19 (Coronavirus) information  In the past 14 days, Sanhdya has not worked in a congregate living setting.   She " does not work or volunteer as healthcare worker or a  and does not work or volunteer in a healthcare facility.   Sandhya also has not lived in a congregate living setting in the past 14 days. She does not live with a healthcare worker.   Sandhya has not had a close contact with a laboratory-confirmed COVID-19 patient within 14 days of symptom onset.   Pertinent medical history  Sandhya typically gets a yeast infection when she takes antibiotics. She has used fluconazole (Diflucan) to treat previous yeast infections. 2 doses of fluconazole (Diflucan) has typically been needed for symptoms to resolve in the past.  Sandhya does not need a return to work/school note.   Weight: 160 lbs   Sandhya does not smoke or use smokeless tobacco.   She denies pregnancy and denies breastfeeding. She has menstruated in the past month.   Additional information as reported by the patient (free text): I am having a feeling of chest congestion it's annoying. The cough is infrequent but deep and productive.   Weight: 160 lbs    MEDICATIONS: guaifenesin oral, Allegra-D 24 Hour oral, Ventolin HFA inhalation, Flonase Allergy Relief nasal, Singulair oral, Qvar RediHaler inhalation, sertraline oral, ALLERGIES: theophylline, morphine  Clinician Response:  Dear Sandhya,      Your symptoms show that you may have coronavirus (COVID-19). This illness can cause fever, cough and trouble breathing. Many people get a mild case and get better on their own. Some people can get very sick.  What should I do?  We would like to test you for this virus. This will be a curbside test done outside the clinic.  Please call [phone] to schedule your visit. Explain that [facility] have a COVID-19 test. Be ready to share your [facility] visit ID number.  Starting now:  Stay at least 6 feet away from others. (If someone will drive you to your test, stay in the backseat, as far away from the  as you can.)   Don't go to work,  "school or anywhere else. When it's time for your test, go straight to the testing site. Don't make any stops on the way there or back.   Wash your hands and face often. Use soap and water.   Cover your mouth and nose with a mask, tissue or washcloth.   Don't touch anyone. No hugging, kissing or handshakes.  While at home   Stay home and away from others (self-isolate) until:  You've had no fever---and no medicine that reduces fever---for 3 full days (72 hours). And...  Your other symptoms have gotten better. For example, your cough or breathing has improved. And...  At least 10 days have passed since your symptoms started.  During this time:  Stay in your own room (and use your own bathroom), if you can.  Don't go to work, school or anywhere else.  Stay away from others in your home. No hugging, kissing or shaking hands.  Don't let anyone visit.  Cover your mouth and nose with a mask, tissue or washcloth to avoid spreading germs.  Clean \"high touch\" surfaces often (doorknobs, counters, handles, etc.). Use a household cleaning spray or wipes.  Wash your hands and face often. Use soap and water.  How can I take care of myself?  1. Get lots of rest. Drink extra fluids (unless your doctor has told you not to).  2. Take Tylenol (acetaminophen) for fever or pain. If you have liver or kidney problems, ask your family doctor if it's okay to take Tylenol.  Adults can take either:   650 mg (two 325 mg pills) every 4 to 6 hours, or...  1,000 mg (two 500 mg pills) every 8 hours as needed.   Note: Don't take more than 3,000 mg in one day.   Acetaminophen is found in many medicines (both prescribed and over-the-counter medicines). Read all labels to be sure you don't take too much.   For children, check the Tylenol bottle for the right dose. The dose is based on the child's age or weight.  3. If you have other health problems (like cancer, heart failure, an organ transplant or severe kidney disease): Call your specialty clinic if " you don't feel better in the next 2 days.  4. Know when to call 911: If your breathing is so bad that it keeps you from doing normal activities, call 911 or go to the emergency room. Tell them that you've been staying home and may have COVID-19.  5. Sign up for [employer]. We know it's scary to hear that you might have COVID-19. We want to track your symptoms to make sure you're okay over the next 2 weeks. Please look for an email from [employer]---this is a free, online program that we'll use to keep in touch. To sign up, follow the link in the email. Learn more at [email or web address].  6. The following will serve as your written order for this Covid Test ordered by me for the indication of suspected Covid [Z20.828]: The test will be ordered in [employer], our electronic health record after you are scheduled and will show as ordered and authorized by Wil Hay MD   Order: Covid-19 (Coronavirus) PCR for SYMPTOMATIC testing from [facility]  Where can I get more information?  To learn more about COVID-19 and how to care for yourself at home, please visit the CDC website at https://www.cdc.gov/coronavirus/2019-ncov/about/steps-when-sick.html.  For more about your care at [facility], please visit [email or web address]  If you'd like to be part of a COVID-19 clinical trial (research study) at the [facility], go to [email or web address] for details.    Diagnosis: Acute upper respiratory infection, unspecified  Diagnosis ICD: J06.9

## 2020-05-28 ENCOUNTER — AMBULATORY - HEALTHEAST (OUTPATIENT)
Dept: FAMILY MEDICINE | Facility: CLINIC | Age: 40
End: 2020-05-28

## 2020-05-28 DIAGNOSIS — Z20.822 SUSPECTED COVID-19 VIRUS INFECTION: ICD-10-CM

## 2020-05-29 ENCOUNTER — COMMUNICATION - HEALTHEAST (OUTPATIENT)
Dept: FAMILY MEDICINE | Facility: CLINIC | Age: 40
End: 2020-05-29

## 2020-06-12 ENCOUNTER — VIRTUAL VISIT (OUTPATIENT)
Dept: FAMILY MEDICINE | Facility: OTHER | Age: 40
End: 2020-06-12

## 2020-06-12 NOTE — PROGRESS NOTES
"Date: 2020 14:42:29  Clinician: Camryn Andrews  Clinician NPI: [id number]  Patient: Sandhya Caal  Patient : 1980  Patient Address: [address] [place] [address]  Patient Phone: [phone]  Visit Protocol: URI  Patient Summary:  Sandhya is a 40 year old ( : 1980 ) female who initiated a Visit for cold, sinus infection, or influenza. When asked the question \"Please sign me up to receive news, health information and promotions. \", Sandhya responded \"Yes\".    Sandhya states her symptoms started gradually 2-3 weeks ago. After her symptoms started, they improved and then got worse again.   Her symptoms consist of tooth pain, a sore throat, enlarged lymph nodes, malaise, myalgia, facial pain or pressure, a cough, nasal congestion, rhinitis, ear pain, a headache, and chills. She is experiencing difficulty breathing due to nasal congestion but she is not short of breath. Sandhya also feels feverish.   Symptom details     Nasal secretions: The color of her mucus is yellow, white, and green.    Cough: Sandhya coughs a few times an hour and her cough is not more bothersome at night. Phlegm comes into her throat when she coughs. She believes her cough is caused by post-nasal drip. The color of the phlegm is yellow.     Sore throat: Sandhya reports having mild throat pain (1-3 on a 10 point pain scale), does not have exudate on her tonsils, and can swallow liquids. The lymph nodes in her neck are enlarged. A rash has not appeared on the skin since the sore throat started.     Temperature: Her current temperature is 99.4 degrees Fahrenheit.     Facial pain or pressure: The facial pain or pressure feels worse when bending over or leaning forward.     Headache: She states the headache is moderate (4-6 on a 10 point pain scale).     Tooth pain: The tooth pain is not caused by a cavity, recent dental work, or other mouth problems.      Sandhya denies having wheezing, nausea, ageusia, diarrhea, " anosmia, and vomiting. She also denies having recent facial or sinus surgery in the past 60 days and taking antibiotic medication for the symptoms.   Precipitating events  Within the past week, Sandhya has not been exposed to someone with strep throat. She has not recently been exposed to someone with influenza. Sandhya has been in close contact with the following high risk individuals: adults 65 or older.   Pertinent COVID-19 (Coronavirus) information  In the past 14 days, Sandhya has not worked in a congregate living setting.   She does not work or volunteer as healthcare worker or a  and does not work or volunteer in a healthcare facility.   Sandhya also has not lived in a congregate living setting in the past 14 days. She does not live with a healthcare worker.   Sandhya has not had a close contact with a laboratory-confirmed COVID-19 patient within 14 days of symptom onset.   Pertinent medical history  Sandhya had 2 sinus infections within the past year.   Sandhya typically gets a yeast infection when she takes antibiotics. She has used fluconazole (Diflucan) to treat previous yeast infections. 2 doses of fluconazole (Diflucan) has typically been needed for symptoms to resolve in the past.  Sandhya does not need a return to work/school note.   Weight: 161 lbs   Sandhya does not smoke or use smokeless tobacco.   She denies pregnancy and denies breastfeeding. She has menstruated in the past month.   Additional information as reported by the patient (free text): I was swabbed for COVID on 05/28/20 and had a negative result. I feel like the mucous and productive cough started around May 21st and sinuses/upper molars really began to hurt within the last 3 days.   Weight: 161 lbs    MEDICATIONS: guaifenesin oral, Allegra-D 24 Hour oral, Ventolin HFA inhalation, Flonase Allergy Relief nasal, Singulair oral, Qvar RediHaler inhalation, sertraline oral, ALLERGIES: theophylline, morphine  Clinician  Response:  Dear Sandhya,  Based on the information provided, you have acute bacterial sinusitis, also known as a sinus infection. Sinus infections are caused by bacteria or a virus and symptoms are almost always identical. The difference between the 2 types of infections is timing.  Sinus infections start as viral infections and symptoms improve on their own in about 7 days. If symptoms have not improved after 7 days or have even worsened, a bacterial infection may have developed.  Medication information  I am prescribing:     Doxycycline hyclate 100 mg oral tablet. Take 1 tablet by mouth 2 times per day for 7 days. There are no refills with this prescription.   Certain antibiotics such as Doxycycline, levofloxacin, and moxifloxacin can cause your skin to be more sensitive to the sun. Exposure to the sun when taking these antibiotics may cause skin rash, itching, redness, or a severe sunburn. Be sure to avoid prolonged or direct exposure to the sun, especially between 10 am and 3 pm, if possible. Wear protective clothing and use sunscreen of SPF 30 or higher when you are outdoors.  Because you usually get a yeast infection when taking antibiotics, I am also prescribing:     Fluconazole (Diflucan) 150 mg oral tablet. Take 1 tablet by mouth in a single dose. Repeat dose in 3 days if symptoms are still present. There are no refills with this prescription.   If you become pregnant during this course of treatment, stop taking the medication and contact your primary care provider.  Unless you are allergic to the over-the-counter medication(s) below, I recommend using:       Acetaminophen (Tylenol or store brand) oral tablet. Take 1-2 tablets by mouth every 4-6 hours to help with the discomfort.      Guaifenesin + dextromethorphan (Robitussin DM, Mucinex DM, or store brand).      Saline nasal spray or drops(Ocean or store brand). Use 1-2 drops or sprays in each nostril as needed for congestion.     Over-the-counter  medications do not require a prescription. Ask the pharmacist if you have any questions.  Self care  Steps you can take to be as comfortable as possible:     Rest.    Drink plenty of fluids.    Take a warm shower to loosen congestion    Use a cool-mist humidifier.    Use throat lozenges.    Suck on frozen items such as popsicles.    Drink hot tea with lemon and honey.    Gargle with warm salt water (1/4 teaspoon of salt per 8 ounce glass of water).    Take a spoonful of honey to reduce your cough.     When to seek care  Please be seen in a clinic or urgent care if any of the following occur:     New symptoms develop, or symptoms become worse    Symptoms do not start to improve after 3 days of treatment     Call ahead before going to the clinic or urgent care.  It is possible to have an allergic reaction to an antibiotic even if you have not had one in the past. If you notice a new rash, significant swelling, or difficulty breathing, stop taking this medication immediately and go to a clinic or urgent care.  Call 911 or go to the emergency room if you feel that your throat is closing off, you suddenly develop a rash, you are unable to swallow fluids, you are drooling, or you are having difficulty breathing.  COVID-19 (Coronavirus) General Information  Because there is currently no vaccine to prevent infection, the best way to protect yourself is to avoid being exposed to this virus. Common symptoms of COVID-19 include but are not limited to fever, cough, and shortness of breath. These symptoms appear 2-14 days after you are exposed to the virus that causes COVID-19. Click here for more information from the CDC on how to protect yourself.  If you are sick with COVID-19 or suspect you are infected with the virus that causes COVID-19, follow the steps here from the CDC to help prevent the disease from spreading to people in your home and community.  Click here for general information from the CDC on testing.  If you  develop any of these emergency warning signs for COVID-19, get medical attention immediately:     Trouble breathing    Persistent pain or pressure in the chest    New confusion or inability to arouse    Bluish lips or face      Call your doctor or clinic before going in. Call 911 if you have a medical emergency and notify the  you have or think you may have COVID-19.  For more detailed and up to date information on COVID-19 (Coronavirus), please visit the CDC website.   Diagnosis: Acute bacterial sinusitis  Diagnosis ICD: J01.90  Additional Clinician Notes: Keep using your Flonase nasal spray daily. Steam treatment, warm compresses to your face.  Prescription: fluconazole (Diflucan) 150 mg oral tablet 2 tablet, 4 days supply. Take 1 tablet by mouth in a single dose, repeat dose in 3 days if symptoms are still present. Refills: 0, Refill as needed: no, Allow substitutions: yes  Prescription: doxycycline hyclate 100 mg oral tablet 14 tablet, 7 days supply. Take 1 tablet by mouth 2 times per day for 7 days. Refills: 0, Refill as needed: no, Allow substitutions: yes  Pharmacy: [facility] #[id number] - [phone] - [address], [place] [address]

## 2020-07-10 DIAGNOSIS — Z11.59 SCREENING FOR VIRAL DISEASE: ICD-10-CM

## 2020-07-14 ENCOUNTER — COMMUNICATION - HEALTHEAST (OUTPATIENT)
Dept: FAMILY MEDICINE | Facility: CLINIC | Age: 40
End: 2020-07-14

## 2020-07-14 DIAGNOSIS — F41.1 GENERALIZED ANXIETY DISORDER: ICD-10-CM

## 2020-08-05 ENCOUNTER — COMMUNICATION - HEALTHEAST (OUTPATIENT)
Dept: FAMILY MEDICINE | Facility: CLINIC | Age: 40
End: 2020-08-05

## 2020-08-05 DIAGNOSIS — F41.1 GENERALIZED ANXIETY DISORDER: ICD-10-CM

## 2020-08-06 RX ORDER — HYDROXYZINE PAMOATE 50 MG/1
50 CAPSULE ORAL
Qty: 50 CAPSULE | Refills: 1 | Status: SHIPPED | OUTPATIENT
Start: 2020-08-06

## 2020-09-19 ENCOUNTER — VIRTUAL VISIT (OUTPATIENT)
Dept: FAMILY MEDICINE | Facility: OTHER | Age: 40
End: 2020-09-19

## 2020-09-19 NOTE — PROGRESS NOTES
"Date: 2020 13:38:49  Clinician: Toño Martinez  Clinician NPI: [id number]  Patient: Sandhya Caal  Patient : 1980  Patient Address: [address] [place], [place] [address]  Patient Phone: [phone]  Visit Protocol: URI  Patient Summary:  Sandhya is a 40 year old ( : 1980 ) female who initiated a [facility] Visit for COVID-19 (Coronavirus) evaluation and screening. When asked the question \"Please sign me up to receive news, health information and promotions. \", Sandhya responded \"No\".    Sandhya states her symptoms started gradually 3-4 days ago.   Her symptoms consist of malaise, a sore throat, a cough, nasal congestion, a headache, enlarged lymph nodes, and myalgia. She is experiencing mild difficulty breathing with activities but can speak normally in full sentences. Sandhya also feels feverish.   Symptom details     Nasal secretions: The color of her mucus is blood-tinged and green.    Cough: Sandhya coughs a few times an hour and her cough is not more bothersome at night. Phlegm does not come into her throat when she coughs. She believes her cough is caused by post-nasal drip.     Sore throat: Sandhya reports having moderate throat pain (4-6 on a 10 point pain scale), does not have exudate on her tonsils, and can swallow liquids. The lymph nodes in her neck are enlarged. A rash has not appeared on the skin since the sore throat started.     Temperature: Her current temperature is 99.5 degrees Fahrenheit.     Headache: She states the headache is mild (1-3 on a 10 point pain scale).      Sandhya denies having chills, facial pain or pressure, teeth pain, ageusia, diarrhea, ear pain, anosmia, vomiting, rhinitis, nausea, and wheezing. She also denies double sickening (worsening symptoms after initial improvement), taking antibiotic medication in the past month, and having recent facial or sinus surgery in the past 60 days.   Precipitating events  Within the past week, Sandhya has not been " exposed to someone with strep throat. She has not recently been exposed to someone with influenza. Sandhya has been in close contact with the following high risk individuals: people with asthma, heart disease or diabetes.   Pertinent COVID-19 (Coronavirus) information  In the past 14 days, Sandhya has not worked in a congregate living setting.   She does not work or volunteer as healthcare worker or a  and does not work or volunteer in a healthcare facility.   Sandhya also has not lived in a congregate living setting in the past 14 days. She does not live with a healthcare worker.   Sandhya has not had a close contact with a laboratory-confirmed COVID-19 patient within 14 days of symptom onset.   Since December 2019, Sandhya and has had upper respiratory infection (URI) or influenza-like illness. Has not been diagnosed with lab-confirmed COVID-19 test      Date(s) of previous URI or influenza-like illness (free-text): I had [facility] visits. Roughly April and late May.     Symptoms Sandhya experienced during previous URI or influenza-like illness as reported by the patient (free-text): Fatigue, sore throat, cough, low grade fever.        Pertinent medical history  Sandhya had 3 sinus infections within the past year.   Sandhya typically gets a yeast infection when she takes antibiotics. She has used fluconazole (Diflucan) to treat previous yeast infections. 2 doses of fluconazole (Diflucan) has typically been needed for symptoms to resolve in the past.  Sandhya needs a return to work/school note.   Weight: 164 lbs   Sandhya does not smoke or use smokeless tobacco.   She denies pregnancy and denies breastfeeding. She has menstruated in the past month.   Additional information as reported by the patient (free text): Earlier this week I was experiencing some dizziness.   Weight: 164 lbs    MEDICATIONS: Vistaril oral, guaifenesin oral, Qvar RediHaler inhalation, Ventolin HFA inhalation, Flonase Allergy Relief  "nasal, Allegra-D 24 Hour oral, Singulair oral, sertraline oral, ALLERGIES: theophylline, morphine  Clinician Response:  Dear Sandhya,   Your symptoms show that you may have coronavirus (COVID-19). This illness can cause fever, cough and trouble breathing. Many people get a mild case and get better on their own. Some people can get very sick.  What should I do?  We would like to test you for this virus.   1. Please call [phone] to schedule your visit. Explain that you were referred by [facility] to have a COVID-19 test. [facility]  The following will serve as your written order for this COVID Test, ordered by me, for the indication of suspected COVID [Z20.828]: The test will be ordered in [employer], our electronic health record, after you are scheduled. It will show as ordered and authorized by Qasim Hay MD.  Order: COVID-19 (Coronavirus) PCR for SYMPTOMATIC testing from [facility].      2. When it's time for your COVID test:  Stay at least 6 feet away from others. (If someone will drive you to your test, stay in the backseat, as far away from the  as you can.)   Cover your mouth and nose with a mask, tissue or washcloth.  Go straight to the testing site. Don't make any stops on the way there or back.      3.Starting now: Stay home and away from others (self-isolate) until:   You've had no fever---and no medicine that reduces fever---for one full day (24 hours). And...   Your other symptoms have gotten better. For example, your cough or breathing has improved. And...   At least 10 days have passed since your symptoms started.       During this time, don't leave the house except for testing or medical care.   Stay in your own room, even for meals. Use your own bathroom if you can.   Stay away from others in your home. No hugging, kissing or shaking hands. No visitors.  Don't go to work, school or anywhere else.    Clean \"high touch\" surfaces often (doorknobs, counters, handles, " etc.). Use a household cleaning spray or wipes. You'll find a full list of  on the EPA website: www.epa.gov/pesticide-registration/list-n-disinfectants-use-against-sars-cov-2.   Cover your mouth and nose with a mask, tissue or washcloth to avoid spreading germs.  Wash your hands and face often. Use soap and water.  Caregivers in these groups are at risk for severe illness due to COVID-19:  o People 65 years and older  o People who live in a nursing home or long-term care facility  o People with chronic disease (lung, heart, cancer, diabetes, kidney, liver, immunologic)  o People who have a weakened immune system, including those who:   Are in cancer treatment  Take medicine that weakens the immune system, such as corticosteroids  Had a bone marrow or organ transplant  Have an immune deficiency  Have poorly controlled HIV or AIDS  Are obese (body mass index of 40 or higher)  Smoke regularly   o Caregivers should wear gloves while washing dishes, handling laundry and cleaning bedrooms and bathrooms.  o Use caution when washing and drying laundry: Don't shake dirty laundry, and use the warmest water setting that you can.  o For more tips, go to www.cdc.gov/coronavirus/2019-ncov/downloads/10Things.pdf.    4.Sign up for [employer]. We know it's scary to hear that you might have COVID-19. We want to track your symptoms to make sure you're okay over the next 2 weeks. Please look for an email from [employer]---this is a free, online program that we'll use to keep in touch. To sign up, follow the link in the email. Learn more at [email or web address]  How can I take care of myself?   Get lots of rest. Drink extra fluids (unless a doctor has told you not to).   Take Tylenol (acetaminophen) for fever or pain. If you have liver or kidney problems, ask your family doctor if it's okay to take Tylenol.   Adults can take either:    650 mg (two 325 mg pills) every 4 to 6 hours, or...   1,000 mg (two 500 mg  pills) every 8 hours as needed.    Note: Don't take more than 3,000 mg in one day. Acetaminophen is found in many medicines (both prescribed and over-the-counter medicines). Read all labels to be sure you don't take too much.   For children, check the Tylenol bottle for the right dose. The dose is based on the child's age or weight.    If you have other health problems (like cancer, heart failure, an organ transplant or severe kidney disease): Call your specialty clinic if you don't feel better in the next 2 days.       Know when to call 911. Emergency warning signs include:    Trouble breathing or shortness of breath Pain or pressure in the chest that doesn't go away Feeling confused like you haven't felt before, or not being able to wake up Bluish-colored lips or face.  Where can I get more information?   [facility] -- About COVID-19: [email or web address]   CDC -- What to Do If You're Sick: www.cdc.gov/coronavirus/2019-ncov/about/steps-when-sick.html   CDC -- Ending Home Isolation: www.cdc.gov/coronavirus/2019-ncov/hcp/disposition-in-home-patients.html   CDC -- Caring for Someone: www.cdc.gov/coronavirus/2019-ncov/if-you-are-sick/care-for-someone.html   [facility] -- Interim Guidance for Hospital Discharge to Home: www.health.[facility].mn.us/diseases/coronavirus/hcp/hospdischarge.pdf   [facility] clinical trials (COVID-19 research studies): clinicalaffairs.[facility]/n-clinical-trials    Below are the COVID-19 hotlines at the [place] Department of Health ([facility]). Interpreters are available.    For health questions: Call 021-033-1740 or 1-184.824.3711 (7 a.m. to 7 p.m.) For questions about schools and childcare: Call 207-969-1149 or 1-263.693.5889 (7 a.m. to 7 p.m.)    Diagnosis: Cough  Diagnosis ICD: R05

## 2020-09-20 ENCOUNTER — AMBULATORY - HEALTHEAST (OUTPATIENT)
Dept: FAMILY MEDICINE | Facility: CLINIC | Age: 40
End: 2020-09-20

## 2020-09-20 ENCOUNTER — AMBULATORY - HEALTHEAST (OUTPATIENT)
Dept: INTERNAL MEDICINE | Facility: CLINIC | Age: 40
End: 2020-09-20

## 2020-09-20 DIAGNOSIS — Z20.822 SUSPECTED 2019 NOVEL CORONAVIRUS INFECTION: ICD-10-CM

## 2020-09-21 LAB
DEPRECATED S PYO AG THROAT QL EIA: NEGATIVE
SPECIMEN SOURCE: NORMAL

## 2020-09-22 ENCOUNTER — COMMUNICATION - HEALTHEAST (OUTPATIENT)
Dept: SCHEDULING | Facility: CLINIC | Age: 40
End: 2020-09-22

## 2020-09-22 ENCOUNTER — VIRTUAL VISIT (OUTPATIENT)
Dept: LAB | Facility: CLINIC | Age: 40
End: 2020-09-22
Payer: COMMERCIAL

## 2020-09-22 ENCOUNTER — COMMUNICATION - HEALTHEAST (OUTPATIENT)
Dept: FAMILY MEDICINE | Facility: CLINIC | Age: 40
End: 2020-09-22

## 2020-09-22 ENCOUNTER — HOSPITAL ENCOUNTER (OUTPATIENT)
Dept: RADIOLOGY | Facility: CLINIC | Age: 40
Discharge: HOME | End: 2020-09-22
Attending: FAMILY MEDICINE

## 2020-09-22 DIAGNOSIS — M25.561 RIGHT KNEE PAIN, UNSPECIFIED CHRONICITY: ICD-10-CM

## 2020-09-22 DIAGNOSIS — R07.0 THROAT PAIN: Primary | ICD-10-CM

## 2020-09-22 DIAGNOSIS — Z99.2 ESRD (END STAGE RENAL DISEASE) ON DIALYSIS (H): ICD-10-CM

## 2020-09-22 DIAGNOSIS — M25.531 RIGHT WRIST PAIN: ICD-10-CM

## 2020-09-22 DIAGNOSIS — M25.50 POLYARTHRALGIA: ICD-10-CM

## 2020-09-22 DIAGNOSIS — Z13.6 SCREENING FOR CARDIOVASCULAR CONDITION: ICD-10-CM

## 2020-09-22 DIAGNOSIS — D50.0 IRON DEFICIENCY ANEMIA DUE TO CHRONIC BLOOD LOSS: ICD-10-CM

## 2020-09-22 DIAGNOSIS — M79.7 FIBROMYALGIA: ICD-10-CM

## 2020-09-22 DIAGNOSIS — N18.6 ESRD (END STAGE RENAL DISEASE) ON DIALYSIS (H): ICD-10-CM

## 2020-09-22 DIAGNOSIS — R59.1 LYMPHADENOPATHY: ICD-10-CM

## 2020-09-22 LAB
SPECIMEN SOURCE: NORMAL
STREP GROUP A PCR: NOT DETECTED

## 2020-09-22 NOTE — PROGRESS NOTES
"Date: 2020 18:28:14  Clinician: Palmer Pastor  Clinician NPI: [id number]  Patient: Sandhya Caal  Patient : 1980  Patient Address: [address] [place] [address]  Patient Phone: [phone]  Visit Protocol: URI  Patient Summary:  Sandhya is a 40 year old ( : 1980 ) female who initiated a [facility] Visit for cold, sinus infection, or influenza. When asked the question \"Please sign me up to receive news, health information and promotions. \", Sandhya responded \"No\".    Sandhya states her symptoms started gradually 5-6 days ago.   Her symptoms consist of malaise, facial pain or pressure, a sore throat, nasal congestion, a headache, ear pain, enlarged lymph nodes, and myalgia. She is experiencing mild difficulty breathing with activities but can speak normally in full sentences. Sandhya also feels feverish.   Symptom details     Nasal secretions: The color of her mucus is yellow and blood-tinged.    Sore throat: Sandhya reports having severe throat pain (7-9 on a 10 point pain scale), has exudate on her tonsils, and can swallow liquids. The lymph nodes in her neck are enlarged. A rash has not appeared on the skin since the sore throat started.     Temperature: Her current temperature is 99.3 degrees Fahrenheit.     Facial pain or pressure: The facial pain or pressure does not feel worse when bending or leaning forward.     Headache: She states the headache is moderate (4-6 on a 10 point pain scale).      Sandhya denies having chills, teeth pain, ageusia, diarrhea, cough, anosmia, vomiting, rhinitis, nausea, and wheezing. She also denies double sickening (worsening symptoms after initial improvement), taking antibiotic medication in the past month, and having recent facial or sinus surgery in the past 60 days.   Precipitating events  Within the past week, Sandhya has not been exposed to someone with strep throat. She has not recently been exposed to someone with influenza. Sandhya has not " been in close contact with any high risk individuals.   Pertinent COVID-19 (Coronavirus) information  In the past 14 days, Sandhya has not worked in a congregate living setting.   She does not work or volunteer as healthcare worker or a  and does not work or volunteer in a healthcare facility.   Sandhya also has not lived in a congregate living setting in the past 14 days. She does not live with a healthcare worker.   Sandhya has not had a close contact with a laboratory-confirmed COVID-19 patient within 14 days of symptom onset.   Since December 2019, Sandhya and has had upper respiratory infection (URI) or influenza-like illness. Has not been diagnosed with lab-confirmed COVID-19 test      Date(s) of previous URI or influenza-like illness (free-text): April and May 2020     Symptoms Sandhya experienced during previous URI or influenza-like illness as reported by the patient (free-text): Fatigue sore throat cough        Triage Point(s) temporarily suspended for COVID-19 (Coronavirus) screening  Sandhya reported the following symptoms which were previously protocol referral points. These protocol referral points have temporarily been removed for purposes of COVID-19 (Coronavirus) screening.   Meets at least 3/5 centor score criteria     Swollen lymph nodes    Exudate on tonsils    Absence of cough         Pertinent medical history  Sandhya had 3 sinus infections within the past year.   Sandhya typically gets a yeast infection when she takes antibiotics. She has used fluconazole (Diflucan) to treat previous yeast infections. 2 doses of fluconazole (Diflucan) has typically been needed for symptoms to resolve in the past.  Sandhya needs a return to work/school note.   Weight: 164 lbs   Sandhya does not smoke or use smokeless tobacco.   She denies pregnancy and denies breastfeeding. She has menstruated in the past month.   Additional information as reported by the patient (free text): I just got negative  results for COVID 19. I took a look at my throat and it's covered in white spots.   Weight: 164 lbs    MEDICATIONS: Vistaril oral, guaifenesin oral, Qvar RediHaler inhalation, Ventolin HFA inhalation, Flonase Allergy Relief nasal, Allegra-D 24 Hour oral, Singulair oral, sertraline oral, ALLERGIES: theophylline, morphine  Clinician Response:  Dear Sandhya,      Rapid Strep Test - [facility]    I am sorry you are not feeling well. Based on your lab results, you do not have strep throat. This means your symptoms are caused by a virus and not the bacteria that causes strep throat. Antibiotic medications are not effective against a viral infection and will not help symptoms improve or make the condition less contagious.  The following tips will keep you as comfortable as possible while you recover:     Rest    Drink plenty of water and other liquids    Take a hot shower to loosen congestion    Use throat lozenges    Gargle with warm salt water (1/4 teaspoon of salt per 8 ounce glass of water)    Suck on frozen items such as popsicles or ice cubes    Drink hot tea with lemon and honey     Please be seen in a clinic or urgent care if new symptoms develop, or symptoms become worse.  Call 911 or go to the emergency room if you suddenly develop a rash, are drooling or unable to swallow fluids, if you are having difficulty breathing, or feel that your throat is closing off.  Because strep throat can be easily spread to others, proof of evaluation by a provider is often requested before returning to work, school, or . The statement below summarizes my evaluation. Please print a copy of this [facility] Visit if written verification is needed.  Sandhya was evaluated for strep throat and lab testing was completed. As a result, strep throat was ruled out and symptoms are the result of a viral infection. Antibiotics were not prescribed because they are not an effective treatment for viral infections and will not make it less  contagious. Sandhya can return to work, school, or  if she has not had a fever for 24 hours without the use of a fever-reducing medication and feels well enough for daily activities.   Diagnosis: [employer] Strep  Diagnosis ICD: J02.0  [facility] Results: STRAR: Negative  No Group A streptococcal antigen detected by immunoassay. Confirmatory testing  in progress.  [facility] Secondary Results: STRPA: Not Detected  Group A Streptococcus DNA is not detected.  FDA approved assay performed using [employer]pert real-time PCR.

## 2020-09-23 ENCOUNTER — AMBULATORY - HEALTHEAST (OUTPATIENT)
Dept: LAB | Facility: CLINIC | Age: 40
End: 2020-09-23

## 2020-09-23 DIAGNOSIS — Z13.6 SCREENING FOR CARDIOVASCULAR CONDITION: ICD-10-CM

## 2020-09-23 DIAGNOSIS — D50.0 IRON DEFICIENCY ANEMIA DUE TO CHRONIC BLOOD LOSS: ICD-10-CM

## 2020-09-23 DIAGNOSIS — M25.50 POLYARTHRALGIA: ICD-10-CM

## 2020-09-23 DIAGNOSIS — M79.7 FIBROMYALGIA: ICD-10-CM

## 2020-09-23 LAB
ALBUMIN SERPL-MCNC: 4.3 G/DL (ref 3.5–5)
ALBUMIN UR-MCNC: NEGATIVE MG/DL
ALP SERPL-CCNC: 56 U/L (ref 45–120)
ALT SERPL W P-5'-P-CCNC: 9 U/L (ref 0–45)
ANION GAP SERPL CALCULATED.3IONS-SCNC: 10 MMOL/L (ref 5–18)
APPEARANCE UR: CLEAR
AST SERPL W P-5'-P-CCNC: 12 U/L (ref 0–40)
BILIRUB SERPL-MCNC: 0.5 MG/DL (ref 0–1)
BILIRUB UR QL STRIP: NEGATIVE
BUN SERPL-MCNC: 8 MG/DL (ref 8–22)
C REACTIVE PROTEIN LHE: 0.8 MG/DL (ref 0–0.8)
CALCIUM SERPL-MCNC: 9.6 MG/DL (ref 8.5–10.5)
CHLORIDE BLD-SCNC: 101 MMOL/L (ref 98–107)
CHOLEST SERPL-MCNC: 193 MG/DL
CO2 SERPL-SCNC: 26 MMOL/L (ref 22–31)
COLOR UR AUTO: YELLOW
CREAT SERPL-MCNC: 0.69 MG/DL (ref 0.6–1.1)
ERYTHROCYTE [DISTWIDTH] IN BLOOD BY AUTOMATED COUNT: 11.5 % (ref 11–14.5)
FASTING STATUS PATIENT QL REPORTED: YES
GFR SERPL CREATININE-BSD FRML MDRD: >60 ML/MIN/1.73M2
GLUCOSE BLD-MCNC: 90 MG/DL (ref 70–125)
GLUCOSE UR STRIP-MCNC: NEGATIVE MG/DL
HCT VFR BLD AUTO: 39.1 % (ref 35–47)
HDLC SERPL-MCNC: 85 MG/DL
HGB BLD-MCNC: 13.3 G/DL (ref 12–16)
HGB UR QL STRIP: NEGATIVE
IRON SATN MFR SERPL: 30 % (ref 20–50)
IRON SERPL-MCNC: 84 UG/DL (ref 42–175)
KETONES UR STRIP-MCNC: NEGATIVE MG/DL
LDLC SERPL CALC-MCNC: 91 MG/DL
LEUKOCYTE ESTERASE UR QL STRIP: NEGATIVE
MCH RBC QN AUTO: 30 PG (ref 27–34)
MCHC RBC AUTO-ENTMCNC: 33.9 G/DL (ref 32–36)
MCV RBC AUTO: 88 FL (ref 80–100)
NITRATE UR QL: NEGATIVE
PH UR STRIP: 7 [PH] (ref 5–8)
PLATELET # BLD AUTO: 255 THOU/UL (ref 140–440)
PMV BLD AUTO: 6.9 FL (ref 7–10)
POTASSIUM BLD-SCNC: 3.9 MMOL/L (ref 3.5–5)
PROT SERPL-MCNC: 7.4 G/DL (ref 6–8)
RBC # BLD AUTO: 4.42 MILL/UL (ref 3.8–5.4)
RHEUMATOID FACT SERPL-ACNC: <15 IU/ML (ref 0–30)
SODIUM SERPL-SCNC: 137 MMOL/L (ref 136–145)
SP GR UR STRIP: 1.02 (ref 1–1.03)
TIBC SERPL-MCNC: 280 UG/DL (ref 313–563)
TRANSFERRIN SERPL-MCNC: 224 MG/DL (ref 212–360)
TRIGL SERPL-MCNC: 85 MG/DL
TSH SERPL DL<=0.005 MIU/L-ACNC: 1.31 UIU/ML (ref 0.3–5)
URATE SERPL-MCNC: 2.7 MG/DL (ref 2–7.5)
UROBILINOGEN UR STRIP-ACNC: NORMAL
VIT B12 SERPL-MCNC: 381 PG/ML (ref 213–816)
WBC: 7.9 THOU/UL (ref 4–11)

## 2020-09-24 ENCOUNTER — OFFICE VISIT - HEALTHEAST (OUTPATIENT)
Dept: FAMILY MEDICINE | Facility: CLINIC | Age: 40
End: 2020-09-24

## 2020-09-24 DIAGNOSIS — R06.00 DYSPNEA, UNSPECIFIED TYPE: ICD-10-CM

## 2020-09-24 DIAGNOSIS — Z12.4 SCREENING FOR CERVICAL CANCER: ICD-10-CM

## 2020-09-24 DIAGNOSIS — J45.20 MILD INTERMITTENT ASTHMA WITHOUT COMPLICATION: ICD-10-CM

## 2020-09-24 DIAGNOSIS — R53.83 FATIGUE, UNSPECIFIED TYPE: ICD-10-CM

## 2020-09-24 DIAGNOSIS — R50.9 FEELS FEVERISH: ICD-10-CM

## 2020-09-24 DIAGNOSIS — J02.9 PHARYNGITIS, UNSPECIFIED ETIOLOGY: ICD-10-CM

## 2020-09-24 DIAGNOSIS — R59.1 LYMPHADENOPATHY: ICD-10-CM

## 2020-09-24 LAB
25(OH)D3 SERPL-MCNC: 28.5 NG/ML (ref 30–80)
25(OH)D3 SERPL-MCNC: 28.5 NG/ML (ref 30–80)
B BURGDOR IGG+IGM SER QL: 0.07 INDEX VALUE
CCP AB SER IA-ACNC: <0.5 U/ML
DEPRECATED S PYO AG THROAT QL EIA: NORMAL
ERYTHROCYTE [SEDIMENTATION RATE] IN BLOOD BY WESTERGREN METHOD: 13 MM/HR (ref 0–20)

## 2020-09-24 RX ORDER — ALBUTEROL SULFATE 90 UG/1
2 AEROSOL, METERED RESPIRATORY (INHALATION) EVERY 4 HOURS PRN
Qty: 18 G | Refills: 1 | Status: SHIPPED | OUTPATIENT
Start: 2020-09-24 | End: 2021-11-19

## 2020-09-24 ASSESSMENT — PATIENT HEALTH QUESTIONNAIRE - PHQ9: SUM OF ALL RESPONSES TO PHQ QUESTIONS 1-9: 12

## 2020-09-24 ASSESSMENT — MIFFLIN-ST. JEOR: SCORE: 1434.72

## 2020-09-25 LAB
GAMMA INTERFERON BACKGROUND BLD IA-ACNC: 0.04 IU/ML
GROUP A STREP BY PCR: NORMAL
M TB IFN-G BLD-IMP: NEGATIVE
MITOGEN IGNF BCKGRD COR BLD-ACNC: 0.04 IU/ML
MITOGEN IGNF BCKGRD COR BLD-ACNC: 0.06 IU/ML
QTF INTERPRETATION: NORMAL
QTF MITOGEN - NIL: 5.4 IU/ML

## 2020-09-29 LAB — ANA SER QL: 0.2 U

## 2020-09-30 ENCOUNTER — COMMUNICATION - HEALTHEAST (OUTPATIENT)
Dept: SCHEDULING | Facility: CLINIC | Age: 40
End: 2020-09-30

## 2020-10-04 ENCOUNTER — COMMUNICATION - HEALTHEAST (OUTPATIENT)
Dept: FAMILY MEDICINE | Facility: CLINIC | Age: 40
End: 2020-10-04

## 2020-10-04 DIAGNOSIS — B37.31 YEAST INFECTION OF THE VAGINA: ICD-10-CM

## 2020-10-05 LAB
BKR LAB AP ABNORMAL BLEEDING: NO
BKR LAB AP BIRTH CONTROL/HORMONES: NORMAL
BKR LAB AP CERVICAL APPEARANCE: NORMAL
BKR LAB AP GYN ADEQUACY: NORMAL
BKR LAB AP GYN INTERPRETATION: NORMAL
BKR LAB AP HPV REFLEX: NORMAL
BKR LAB AP LMP: NORMAL
BKR LAB AP PATIENT STATUS: NORMAL
BKR LAB AP PREVIOUS ABNORMAL: NORMAL
BKR LAB AP PREVIOUS NORMAL: 2019
HIGH RISK?: NO
PATH REPORT.COMMENTS IMP SPEC: NORMAL
RESULT FLAG (HE HISTORICAL CONVERSION): NORMAL

## 2020-10-07 ENCOUNTER — OFFICE VISIT - HEALTHEAST (OUTPATIENT)
Dept: OTOLARYNGOLOGY | Facility: CLINIC | Age: 40
End: 2020-10-07

## 2020-10-07 DIAGNOSIS — K21.9 LARYNGOPHARYNGEAL REFLUX (LPR): ICD-10-CM

## 2020-10-07 DIAGNOSIS — J37.0 CHRONIC LARYNGITIS: ICD-10-CM

## 2020-10-08 ENCOUNTER — OFFICE VISIT - HEALTHEAST (OUTPATIENT)
Dept: FAMILY MEDICINE | Facility: CLINIC | Age: 40
End: 2020-10-08

## 2020-10-08 DIAGNOSIS — R50.9 FEELS FEVERISH: ICD-10-CM

## 2020-10-08 DIAGNOSIS — R53.81 MALAISE: ICD-10-CM

## 2020-10-08 DIAGNOSIS — J30.89 CHRONIC NONSEASONAL ALLERGIC RHINITIS DUE TO POLLEN: ICD-10-CM

## 2020-10-08 DIAGNOSIS — R21 RASH: ICD-10-CM

## 2020-10-08 DIAGNOSIS — R53.83 FATIGUE, UNSPECIFIED TYPE: ICD-10-CM

## 2020-10-08 DIAGNOSIS — F41.1 GENERALIZED ANXIETY DISORDER: ICD-10-CM

## 2020-10-08 DIAGNOSIS — M94.0 COSTOCHONDRITIS: ICD-10-CM

## 2020-10-08 DIAGNOSIS — F33.0 MILD EPISODE OF RECURRENT MAJOR DEPRESSIVE DISORDER (H): ICD-10-CM

## 2020-10-08 DIAGNOSIS — Z00.00 ROUTINE GENERAL MEDICAL EXAMINATION AT A HEALTH CARE FACILITY: ICD-10-CM

## 2020-10-08 RX ORDER — OMEPRAZOLE 20 MG/1
TABLET, DELAYED RELEASE ORAL
Status: SHIPPED | COMMUNITY
Start: 2020-10-07 | End: 2024-07-30

## 2020-10-11 ENCOUNTER — OFFICE VISIT - HEALTHEAST (OUTPATIENT)
Dept: FAMILY MEDICINE | Facility: CLINIC | Age: 40
End: 2020-10-11

## 2020-10-11 DIAGNOSIS — B96.89 BACTERIAL VAGINOSIS: ICD-10-CM

## 2020-10-11 DIAGNOSIS — N76.0 BACTERIAL VAGINOSIS: ICD-10-CM

## 2020-10-11 DIAGNOSIS — N89.8 VAGINAL DISCHARGE: ICD-10-CM

## 2020-10-11 LAB
CLUE CELLS: ABNORMAL
TRICHOMONAS, WET PREP: ABNORMAL
YEAST, WET PREP: ABNORMAL

## 2020-10-13 LAB
C TRACH DNA SPEC QL PROBE+SIG AMP: NEGATIVE
N GONORRHOEA DNA SPEC QL NAA+PROBE: NEGATIVE

## 2020-10-30 ENCOUNTER — COMMUNICATION - HEALTHEAST (OUTPATIENT)
Dept: FAMILY MEDICINE | Facility: CLINIC | Age: 40
End: 2020-10-30

## 2020-10-30 DIAGNOSIS — J45.20 MILD INTERMITTENT ASTHMA WITHOUT COMPLICATION: ICD-10-CM

## 2020-11-25 ENCOUNTER — OFFICE VISIT - HEALTHEAST (OUTPATIENT)
Dept: FAMILY MEDICINE | Facility: CLINIC | Age: 40
End: 2020-11-25

## 2020-11-25 DIAGNOSIS — N76.0 BACTERIAL VAGINOSIS: ICD-10-CM

## 2020-11-25 DIAGNOSIS — B96.89 BACTERIAL VAGINOSIS: ICD-10-CM

## 2020-11-30 ENCOUNTER — COMMUNICATION - HEALTHEAST (OUTPATIENT)
Dept: SCHEDULING | Facility: CLINIC | Age: 40
End: 2020-11-30

## 2020-12-06 ENCOUNTER — HEALTH MAINTENANCE LETTER (OUTPATIENT)
Age: 40
End: 2020-12-06

## 2020-12-30 ENCOUNTER — COMMUNICATION - HEALTHEAST (OUTPATIENT)
Dept: FAMILY MEDICINE | Facility: CLINIC | Age: 40
End: 2020-12-30

## 2020-12-30 DIAGNOSIS — J45.20 MILD INTERMITTENT ASTHMA WITHOUT COMPLICATION: ICD-10-CM

## 2020-12-30 RX ORDER — BECLOMETHASONE DIPROPIONATE HFA 80 UG/1
AEROSOL, METERED RESPIRATORY (INHALATION)
Qty: 31.8 G | Refills: 4 | Status: SHIPPED | OUTPATIENT
Start: 2020-12-30 | End: 2021-11-19

## 2021-02-23 ENCOUNTER — HOSPITAL ENCOUNTER (OUTPATIENT)
Dept: MAMMOGRAPHY | Facility: CLINIC | Age: 41
Discharge: HOME | End: 2021-02-23
Attending: FAMILY MEDICINE

## 2021-02-23 DIAGNOSIS — Z12.31 SCREENING MAMMOGRAM, ENCOUNTER FOR: ICD-10-CM

## 2021-03-03 ENCOUNTER — OFFICE VISIT - HEALTHEAST (OUTPATIENT)
Dept: FAMILY MEDICINE | Facility: CLINIC | Age: 41
End: 2021-03-03

## 2021-03-03 DIAGNOSIS — J01.90 ACUTE SINUSITIS WITH SYMPTOMS > 10 DAYS: ICD-10-CM

## 2021-03-07 ENCOUNTER — COMMUNICATION - HEALTHEAST (OUTPATIENT)
Dept: FAMILY MEDICINE | Facility: CLINIC | Age: 41
End: 2021-03-07

## 2021-03-07 DIAGNOSIS — J30.89 CHRONIC NONSEASONAL ALLERGIC RHINITIS DUE TO POLLEN: ICD-10-CM

## 2021-03-07 DIAGNOSIS — F33.0 MILD EPISODE OF RECURRENT MAJOR DEPRESSIVE DISORDER (H): ICD-10-CM

## 2021-03-07 DIAGNOSIS — F41.1 GENERALIZED ANXIETY DISORDER: ICD-10-CM

## 2021-03-08 RX ORDER — MONTELUKAST SODIUM 10 MG/1
TABLET ORAL
Qty: 90 TABLET | Refills: 4 | Status: SHIPPED | OUTPATIENT
Start: 2021-03-08 | End: 2021-11-19

## 2021-03-19 ENCOUNTER — COMMUNICATION - HEALTHEAST (OUTPATIENT)
Dept: FAMILY MEDICINE | Facility: CLINIC | Age: 41
End: 2021-03-19

## 2021-05-07 ENCOUNTER — COMMUNICATION - HEALTHEAST (OUTPATIENT)
Dept: FAMILY MEDICINE | Facility: CLINIC | Age: 41
End: 2021-05-07

## 2021-05-07 DIAGNOSIS — J30.89 CHRONIC NONSEASONAL ALLERGIC RHINITIS DUE TO POLLEN: ICD-10-CM

## 2021-05-26 ASSESSMENT — PATIENT HEALTH QUESTIONNAIRE - PHQ9: SUM OF ALL RESPONSES TO PHQ QUESTIONS 1-9: 0

## 2021-05-27 ASSESSMENT — PATIENT HEALTH QUESTIONNAIRE - PHQ9: SUM OF ALL RESPONSES TO PHQ QUESTIONS 1-9: 12

## 2021-05-27 NOTE — TELEPHONE ENCOUNTER
RN cannot approve Refill Request    RN can NOT refill this medication med is not covered by policy/route to provider.    Joaquin Larios, [facility] Triage/Med Refill 4/9/2019    Requested Prescriptions   Pending Prescriptions Disp Refills     fluconazole (DIFLUCAN) 150 MG tablet [Pharmacy Med Name: FLUCONAZOLE 150 MG TABLET] 2 tablet 0     Sig: TAKE 1 TABLET BY MOUTH ONCE FOR 1 DOSE. REPEAT IN ONE WEEK AS NEEDED       There is no refill protocol information for this order

## 2021-05-28 ASSESSMENT — ASTHMA QUESTIONNAIRES
ACT_TOTALSCORE: 21
ACT_TOTALSCORE: 15
ACT_TOTALSCORE: 19
ACT_TOTALSCORE: 13

## 2021-05-28 NOTE — PROGRESS NOTES
Health Maintenance Exam  [facility]  Date of Service: 05/10/2019    Subjective   Sandhya Caal is a 39 y.o. female who presents for a routine physical exam.     Current concerns include the following:     Neck pain  Midline  Burning  Stiffness  Muscle pain  Intermittent - a copule months  Always had some, isolated  Family history of spine problems    Patient Active Problem List    Diagnosis Date Noted     Menorrhagia 12/02/2016     Priority: Medium     Iron deficiency anemia 12/02/2016     Priority: Medium     Overview Note:     Due to menorrhagia. Treated with Mirena IUD.       TMJ arthralgia 10/02/2015     Priority: Medium     Generalized anxiety disorder 01/19/2015     Priority: Medium     Mild intermittent asthma without complication      Priority: Medium     Overview Note:     Exercise induced.       Fibromyalgia      Priority: Medium     Allergies      Priority: Medium     Dyshidrotic eczema 12/02/2016     Priority: Low     Overview Note:     Left 4th finger       PVC (premature ventricular contraction) 12/02/2016     Priority: Low     [place] neuroma, left 10/02/2015     Priority: Low     Migraine Headache      Priority: Low     Past Medical History:   Diagnosis Date     Dyspareunia in female 12/2/2016     Major Depression, Recurrent      Polyuria 3/10/2015      Past Surgical History:   Procedure Laterality Date     SINUS SURGERY Right 02/2006    postoperative nausea + vomiting. Postop pain severe.     TUBAL LIGATION  12/2016    with lysis of adhesions. Dr. Iman Condon [facility]      Current Outpatient Medications   Medication Sig Dispense Refill     albuterol (VENTOLIN HFA) 90 mcg/actuation inhaler Inhale 2 puffs every 4 (four) hours as needed. 18 g 1     beclomethasone (QVAR) 80 mcg/actuation inhaler Inhale 2 puffs 2 (two) times a day. 3 Inhaler 4     cholecalciferol, vitamin D3, 2,000 unit capsule Take 1,000 Units by mouth daily.       cyanocobalamin, vitamin B-12, (VITAMIN  B-12) 1,000 mcg tablet Take 1,000 mcg by mouth daily. 100 tablet 4     fexofenadine (ALLEGRA) 180 MG tablet        fluconazole (DIFLUCAN) 150 MG tablet TAKE 1 TABLET BY MOUTH ONCE FOR 1 DOSE. REPEAT IN ONE WEEK AS NEEDED 2 tablet 0     fluticasone (FLONASE) 50 mcg/actuation nasal spray APPLY TWO SPRAYS IN EACH NOSTRIL EVERY DAY 48 g 4     gabapentin (NEURONTIN) 100 MG capsule Take 100 mg by mouth 3 (three) times a day as needed (burning pain in thighs). 30 capsule 2     Lactobacillus rhamnosus GG (CULTURELLE) 10-15 Billion cell capsule Take 1 capsule by mouth daily.       montelukast (SINGULAIR) 10 mg tablet TAKE ONE TABLET BY MOUTH EVERY DAY 90 tablet 4     sertraline (ZOLOFT) 50 MG tablet TAKE 3 TABLETS (150 MG TOTAL) BY MOUTH DAILY. 270 tablet 2     levonorgestrel (MIRENA) 20 mcg/24 hr (5 years) IUD by Intrauterine route once for 1 dose. 1 each 0     No current facility-administered medications for this visit.       Allergies   Allergen Reactions     Morphine      Theophylline Dizziness and Palpitations     Annotation: Likely a toxicity issue        Social History     Socioeconomic History     Marital status:      Spouse name: Newton Caal     Number of children: 2     Years of education: Bachelors degree     Highest education level: Not on file   Occupational History     Occupation: Coding     Employer: [employer]   Social Needs     Financial resource strain: Not on file     Food insecurity:     Worry: Not on file     Inability: Not on file     Transportation needs:     Medical: Not on file     Non-medical: Not on file   Tobacco Use     Smoking status: Never Smoker     Smokeless tobacco: Never Used   Substance and Sexual Activity     Alcohol use: Yes     Comment: 2/week     Drug use: No     Sexual activity: Yes     Partners: Male     Birth control/protection: Surgical, IUD     Comment: Tubal ligation 2016, IUD for menorrhagia 2017   Lifestyle     Physical activity:     Days per week: Not on  file     Minutes per session: Not on file     Stress: Not on file   Relationships     Social connections:     Talks on phone: Not on file     Gets together: Not on file     Attends Worship service: Not on file     Active member of club or organization: Not on file     Attends meetings of clubs or organizations: Not on file     Relationship status: Not on file     Intimate partner violence:     Fear of current or ex partner: Not on file     Emotionally abused: Not on file     Physically abused: Not on file     Forced sexual activity: Not on file   Other Topics Concern     Not on file   Social History Narrative    : Newton Caal    Kids: Luiz 2012, Leroy 2014.    Diet: Increasing protein, eats fruits and veggies.    Exercise: yoga, running, weights. Once a week.      Family History   Problem Relation Age of Onset     Hypertension Mother      Depression Mother      Hyperlipidemia Mother      Other Mother         Back problems - spinal stenosis. Multiple surgeries. Bronchiectasis. Lichen sclerosis     Osteoarthritis Mother      Kidney disease Mother         Long term Vioxx use     Hypertension Father      Hyperlipidemia Father      Arrhythmia Father         Atrial fibrillation, had ablation     Memory loss Father      Osteoarthritis Father      Hypertension Sister      Anxiety disorder Sister      Depression Sister      Depression Brother         seasonal     Asthma Son      Rheum arthritis Maternal Grandmother      Heart disease Maternal Grandfather      Kidney disease Paternal Grandfather      Anxiety disorder Sister      No Medical Problems Son      Endometriosis Maternal Aunt       REVIEW OF SYSTEMS: negative, except as listed in subjective above.    Physical Exam   /70 (Patient Site: Left Arm, Patient Position: Sitting, Cuff Size: Adult Regular)   Pulse 72   Resp 12   Wt 174 lb (78.9 kg)   LMP 04/17/2019 (Exact Date)   BMI 28.30 kg/m     Social History     Tobacco Use   Smoking Status Never  Smoker   Smokeless Tobacco Never Used     General: Alert, NAD.  Head: NC/AT.  Ears: Normal canal, pinnae and tympanic membrane.  Eyes: PERRL, normal fundi.  Nose: Normal mucosa.  Mouth: Adequate dentition, normal throat.  Neck: normal thyroid, midline trachea. Mild muscle spasm.  Breasts: no masses, skin changes, nipple discharge or tenderness.  Lungs: CTA bilaterally, no increased work of breathing.  Heart: RRR, no m/r/g  Abdomen: soft, nt/nd, BS+  Genitourinary: Normal vulva, normal vaginal mucosa, cervix normal appearance. Purple IUD strings in place. No cervical or adnexal tenderness. No adnexal fullness.  Musculoskelatal: No significant deformity.  Skin: no atypical lesion or rash.  Lymphatics: no lymphadenopathy.   Psych: normal affect.  Physical on 05/10/2019   Component Date Value Ref Range Status     TSH 05/10/2019 0.85  0.30 - 5.00 uIU/mL Final         Assessment & Plan   1. Health Maintenance    Cancer screening: pap smear done 12/1/17 negative with co-testing (repeated today due to bleeding symptoms).    Bone Health: Discussed Calcium, vitamin D, and weight bearing exercise.    Weight Management: Discussed maintenance of healthy body weight. The following high BMI interventions were performed this visit: encouragement to exercise and lifestyle education regarding diet.  2. Contraception: tubal ligation (and IUD for menorrhagia)  3. Advance directive: given and discussed I have had an Advance Directives discussion with the patient.   4. Neck pain - some degenerative changes on x-ray. Referral to PT.  5. Fibromyalgia - referral to neurology for discussion re: medication management options.  6. Menorrhaga - 10 days of spotting and getting longer, rather than shorter, with Mirena IUD in place. Check labs and pap smear. Normal exam.  7. Asthma. Updated ACT/AAP.    Order Summary                                                      1. Routine general medical examination at a health care facility     2. Neck  pain  XR Cervical Spine 2 - 3 VWS    Ambulatory referral to Physical Therapy   3. Fibromyalgia  Ambulatory referral to Neurology   4. Menorrhagia with regular cycle  Ambulatory referral to Obstetrics / Gynecology    Thyroid Thayer    Gynecologic Cytology (PAP Smear)    HPV High Risk DNA Cervical   5. Iron deficiency anemia due to chronic blood loss     6. Mild intermittent asthma without complication  albuterol (VENTOLIN HFA) 90 mcg/actuation inhaler   7. Cervical cancer screening  Gynecologic Cytology (PAP Smear)    HPV High Risk DNA Cervical      No future appointments.    Completed by: Amira Kevin M.D., [facility]. 5/14/2019 1:59 PM.  This transcription uses voice recognition software, which may contain typographical errors.

## 2021-05-29 ENCOUNTER — RECORDS - HEALTHEAST (OUTPATIENT)
Dept: ADMINISTRATIVE | Facility: CLINIC | Age: 41
End: 2021-05-29

## 2021-05-29 NOTE — PROGRESS NOTES
[facility] Daily Progress     Patient Name: Sandhya Caal  Date: 6/6/2019  Visit #: 4/10  Referral Diagnosis: Neck pain  Referring provider: Amira Kevin MD  Visit Diagnosis:     ICD-10-CM    1. Cervicalgia M54.2        Precautions / Restrictions : asthma, migraine HA, fibromyalgia, anxiety, TMJ arthralgia, PVC       Assessment:     Response to Intervention:  Tolerated manual therapy well with report of decreased pain and increased motion.    Symptoms are consistent with:  Medical diagnosis  Patient is appropriate to continue with skilled physical therapy intervention, as indicated by initial plan of care.    Goal Status:  Pt. will demonstrate/verbalize independence in self-management of condition in : 6 weeks  Pt. will be independent with home exercise program in : 6 weeks  Pt. will improve posture : and demonstrate posture with minimal to no cuing;in 6 weeks  Patient Turn Head: for driving;with full ROM;with less pain;with less difficulty;in 6 weeks  Patient will look up / down: for reading;for computer work;with full ROM;with less pain;with less difficulty;in 6 weeks    Patient will be able to: with less pain;with less difficulty;in 6 weeks (type and mouse)    Other functional progress:           Plan / Patient Education:     Continue with initial plan of care.  Progress with home program as tolerated.       Subjective:     Pain Rating:  Resting 0  Activity:  3    Response to last treatment: good.  HEP- Frequency: 3-4x/day, Questions or difficulties:  none.    Patient reports:      Continued improvement of symptoms.    Burning tingling sensation resolved.      Objective:       Myofascial restriction of cervical paraspinals, suboccipitals, scalenes, upper trap      Treatment Today   Manual Therapy  Manual therapy:  Neck    MFR layers 1-3 bilateral:  Suboccipitals, cervical extensors, cervical paraspinal rotators, scalenes.    Manual therapy:  Cervical longitudinal mobilization    Rate/grade  "Target  Direction  Relative movement Location in range Patient position   2 Cervical vertebrae Superior Distraction of facet joints Head in neutral Supine          Exercises:  Exercise #1: cervical retraction  Comment #1: 10  Exercise #2: scapular retraction  Comment #2: 10  Exercise #3: pec stretch  Comment #3: 30\" x 2   Exercise #4: supine head nod for deep neck flexors  Comment #4: 10 with re-instruction to progress to 5 second and 10 second hold.  Exercise #5: ulnar butterfly  Comment #5: 10 bilateral  Exercise #6: ulnar nerve glide  Comment #6: 10 bilateral            TREATMENT MINUTES COMMENTS   Evaluation     Self-care/ Home management     Manual therapy 20 See above.   Neuromuscular Re-education     Therapeutic Activity     Therapeutic Exercises 10 See exercise flow-sheet for details. Verbal review of HEP.   Gait training     Modality__________________                Total 30    Blank areas are intentional and mean the treatment did not include these items.       Joaquin Will, PT  6/6/2019     "

## 2021-05-29 NOTE — PROGRESS NOTES
[facility] Daily Progress     Patient Name: Sandhya Caal  Date: 6/4/2019  Visit #: 3/10  Referral Diagnosis: Neck pain  Referring provider: Amira Kevin MD  Visit Diagnosis:     ICD-10-CM    1. Cervicalgia M54.2        Precautions / Restrictions : asthma, migraine HA, fibromyalgia, anxiety, TMJ arthralgia, PVC       Assessment:     Response to Intervention:  Tolerated manual therapy well with report of decreased pain and increased motion.    Symptoms are consistent with:  Medical diagnosis  Patient is appropriate to continue with skilled physical therapy intervention, as indicated by initial plan of care.    Goal Status:  Pt. will demonstrate/verbalize independence in self-management of condition in : 6 weeks  Pt. will be independent with home exercise program in : 6 weeks  Pt. will improve posture : and demonstrate posture with minimal to no cuing;in 6 weeks  Patient Turn Head: for driving;with full ROM;with less pain;with less difficulty;in 6 weeks  Patient will look up / down: for reading;for computer work;with full ROM;with less pain;with less difficulty;in 6 weeks    Patient will be able to: with less pain;with less difficulty;in 6 weeks (type and mouse)    Other functional progress:           Plan / Patient Education:     Continue with initial plan of care.  Progress with home program as tolerated.       Subjective:     Pain Rating:  Resting 0  Activity:  3    Response to last treatment: good.  HEP- Frequency: 3-4x/day, Questions or difficulties:  none.    Patient reports:      Continued improvement of symptoms.    Burning tingling sensation resolved.      Objective:       Myofascial restriction of cervical paraspinals, suboccipitals, scalenes, upper trap      Treatment Today   Manual Therapy  Manual therapy:  Neck    MFR layers 1-3 bilateral:  Suboccipitals, cervical extensors, cervical paraspinal rotators, scalenes.    Manual therapy:  Cervical longitudinal mobilization    Rate/grade  "Target  Direction  Relative movement Location in range Patient position   2 Cervical vertebrae Superior Distraction of facet joints Head in neutral Supine          Exercises:  Exercise #1: cervical retraction  Comment #1: 10  Exercise #2: scapular retraction  Comment #2: 10  Exercise #3: pec stretch  Comment #3: 30\" x 2             TREATMENT MINUTES COMMENTS   Evaluation     Self-care/ Home management     Manual therapy 25 See above.   Neuromuscular Re-education     Therapeutic Activity     Therapeutic Exercises 5 See exercise flow-sheet for details. Verbal review of HEP.   Gait training     Modality__________________                Total 30    Blank areas are intentional and mean the treatment did not include these items.       Joaquin Will, PT  6/4/2019     "

## 2021-05-29 NOTE — PROGRESS NOTES
[facility] Daily Progress     Patient Name: Sandhya Caal  Date: 6/13/2019  Visit #: 5/10  Referral Diagnosis: Neck pain  Referring provider: Amira Kevin MD  Visit Diagnosis:     ICD-10-CM    1. Cervicalgia M54.2        Precautions / Restrictions : asthma, migraine HA, fibromyalgia, anxiety, TMJ arthralgia, PVC       Assessment:     Response to Intervention:  Tolerated manual therapy well with report of decreased pain and increased motion.    Symptoms are consistent with:  Medical diagnosis  Patient is appropriate to continue with skilled physical therapy intervention, as indicated by initial plan of care.    Goal Status:  Pt. will demonstrate/verbalize independence in self-management of condition in : 6 weeks  Pt. will be independent with home exercise program in : 6 weeks  Pt. will improve posture : and demonstrate posture with minimal to no cuing;in 6 weeks  Patient Turn Head: for driving;with full ROM;with less pain;with less difficulty;in 6 weeks  Patient will look up / down: for reading;for computer work;with full ROM;with less pain;with less difficulty;in 6 weeks    Patient will be able to: with less pain;with less difficulty;in 6 weeks (type and mouse)    Other functional progress:           Plan / Patient Education:     Continue with initial plan of care.  Progress with home program as tolerated.       Subjective:     Pain Rating:  Resting 0  Activity:  3    Response to last treatment: good.  HEP- Frequency: 3-4x/day, Questions or difficulties:  none.    Patient reports:      Posterior neck symptoms much better.    Residual anterior neck pain likely influenced by clinching jaw.      Objective:       Myofascial restriction of cervical paraspinals, suboccipitals, scalenes, upper trap      Treatment Today   Manual Therapy  Manual therapy:  Neck    MFR layers 1-3 bilateral:  Platysma, SCM, scalenes, masseter, .    Manual therapy:  Cervical longitudinal mobilization    Rate/grade Target   "Direction  Relative movement Location in range Patient position   2 Cervical vertebrae Superior Distraction of facet joints Head in neutral Supine          Exercises:  Exercise #1: cervical retraction  Comment #1: 10  Exercise #2: scapular retraction  Comment #2: 10  Exercise #3: pec stretch  Comment #3: 30\" x 2   Exercise #4: supine head nod for deep neck flexors  Comment #4: 10 with re-instruction to progress to 5 second and 10 second hold.  Exercise #5: ulnar butterfly  Comment #5: 10 bilateral  Exercise #6: ulnar nerve glide  Comment #6: 10 bilateral            TREATMENT MINUTES COMMENTS   Evaluation     Self-care/ Home management     Manual therapy 35 See above.   Neuromuscular Re-education     Therapeutic Activity     Therapeutic Exercises  See exercise flow-sheet for details. Verbal review of HEP.   Gait training     Modality__________________                Total 35    Blank areas are intentional and mean the treatment did not include these items.       Joaquin Will, PT  6/13/2019     "
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
## 2021-05-29 NOTE — PROGRESS NOTES
[facility] Daily Progress     Patient Name: Sandhya Caal  Date: 5/30/2019  Visit #: 2/10  Referral Diagnosis: Neck pain  Referring provider: Amira Kevin MD  Visit Diagnosis:     ICD-10-CM    1. Cervicalgia M54.2        Precautions / Restrictions : asthma, migraine HA, fibromyalgia, anxiety, TMJ arthralgia, PVC       Assessment:     Response to Intervention:  Tolerated manual therapy well with report of decreased pain and increased motion.    Symptoms are consistent with:  Medical diagnosis  Patient is appropriate to continue with skilled physical therapy intervention, as indicated by initial plan of care.    Goal Status:  Pt. will demonstrate/verbalize independence in self-management of condition in : 6 weeks  Pt. will be independent with home exercise program in : 6 weeks  Pt. will improve posture : and demonstrate posture with minimal to no cuing;in 6 weeks  Patient Turn Head: for driving;with full ROM;with less pain;with less difficulty;in 6 weeks  Patient will look up / down: for reading;for computer work;with full ROM;with less pain;with less difficulty;in 6 weeks    Patient will be able to: with less pain;with less difficulty;in 6 weeks (type and mouse)    Other functional progress:           Plan / Patient Education:     Continue with initial plan of care.  Progress with home program as tolerated.       Subjective:     Pain Rating:  Resting 0  Activity:  3    Response to last treatment: good.  HEP- Frequency: 3-4x/day, Questions or difficulties:  none.    Patient reports:      Increased ROM and less frequent pain.      Objective:       Myofascial restriction of cervical paraspinals, suboccipitals, scalenes, upper trap      Treatment Today   Manual Therapy  Manual therapy:  Neck    MFR layers 1-3 bilateral:  Suboccipitals, cervical extensors, cervical paraspinal rotators, scalenes.    Manual therapy:  Cervical longitudinal mobilization    Rate/grade Target  Direction  Relative movement  "Location in range Patient position   2 Cervical vertebrae Superior Distraction of facet joints Head in neutral Supine          Exercises:  Exercise #1: cervical retraction  Comment #1: 10  Exercise #2: scapular retraction  Comment #2: 10  Exercise #3: pec stretch  Comment #3: 30\" x 2             TREATMENT MINUTES COMMENTS   Evaluation     Self-care/ Home management     Manual therapy 24 See above.   Neuromuscular Re-education     Therapeutic Activity     Therapeutic Exercises 2 See exercise flow-sheet for details. Verbal review of HEP.   Gait training     Modality__________________                Total 26    Blank areas are intentional and mean the treatment did not include these items.       Joaquin Will, PT  5/30/2019     "

## 2021-05-29 NOTE — PROGRESS NOTES
[facility] Discharge Summary  Patient Name: Sandhya Caal  Date: 2/4/2020  Referral Diagnosis: Neck pain  Referring provider: Amira Kevin MD  Visit Diagnosis:   1. Cervicalgia         Goals:  No data recorded  No data recorded    Patient was seen for 6 visits physical therapy.    The patient attended therapy initially, but did not finish the therapy sessions prescribed.  Goals were not fully achieved. Explanation for goals not achieved: The patient discontinued therapy, did not return.    Therapy will be discontinued at this time.  Please see progress note dated 6/20/19 for patient status.      Thank you for your referral.  Joaquin PENG Jazmynletytad, PT  2/4/2020  1:35 PM     [facility] Daily Progress     Patient Name: Sandhya Caal  Date: 6/20/2019  Visit #: 6/10  Referral Diagnosis: Neck pain  Referring provider: Amira Kevin MD  Visit Diagnosis:     ICD-10-CM    1. Cervicalgia M54.2        Precautions / Restrictions : asthma, migraine HA, fibromyalgia, anxiety, TMJ arthralgia, PVC       Assessment:     Response to Intervention:  Tolerated manual therapy well with report of decreased pain and increased motion.    Symptoms are consistent with:  Medical diagnosis  Patient is appropriate to continue with skilled physical therapy intervention, as indicated by initial plan of care.    Goal Status:  Pt. will demonstrate/verbalize independence in self-management of condition in : 6 weeks  Pt. will be independent with home exercise program in : 6 weeks  Pt. will improve posture : and demonstrate posture with minimal to no cuing;in 6 weeks  Patient Turn Head: for driving;with full ROM;with less pain;with less difficulty;in 6 weeks  Patient will look up / down: for reading;for computer work;with full ROM;with less pain;with less difficulty;in 6 weeks    Patient will be able to: with less pain;with less difficulty;in 6 weeks (type and mouse)    Other functional progress:           Plan /  "Patient Education:     Continue with initial plan of care.  Progress with home program as tolerated.       Subjective:     Pain Rating:  Resting 0  Activity:  3    Response to last treatment: good.  HEP- Frequency: 3-4x/day, Questions or difficulties:  none.    Patient reports:      Tingling sensations are resolved.    Tight in upper cervical area.    Driving is a lot better.    Feeling like she is ready to continue independently with her HEP.      Objective:       Myofascial restriction of cervical paraspinals, suboccipitals, scalenes, upper trap      Treatment Today   Manual Therapy  Manual therapy:  Neck    MFR layers 1-3 bilateral:  Platysma, SCM, scalenes, masseter, .    Manual therapy:  Cervical longitudinal mobilization    Rate/grade Target  Direction  Relative movement Location in range Patient position   2 Cervical vertebrae Superior Distraction of facet joints Head in neutral Supine          Exercises:  Exercise #1: cervical retraction  Comment #1: 10  Exercise #2: scapular retraction  Comment #2: 10  Exercise #3: pec stretch  Comment #3: 30\" x 2   Exercise #4: supine head nod for deep neck flexors  Comment #4: 10 with re-instruction to progress to 5 second and 10 second hold.  Exercise #5: ulnar butterfly  Comment #5: 10 bilateral  Exercise #6: ulnar nerve glide  Comment #6: 10 bilateral            TREATMENT MINUTES COMMENTS   Evaluation     Self-care/ Home management     Manual therapy 30 See above.   Neuromuscular Re-education     Therapeutic Activity     Therapeutic Exercises  See exercise flow-sheet for details. Verbal review of HEP.   Gait training     Modality__________________                Total 30    Blank areas are intentional and mean the treatment did not include these items.       Joaquin Will, PT  6/20/2019     "

## 2021-05-29 NOTE — PROGRESS NOTES
Walk In Care Note                                                                                 Date of Visit: 6/1/2019     Chief Complaint   Sandhya Caal is a(n) 39 y.o. White or  female who presents to [facility] with the following complaint(s):  Facial Pain and Sore Throat (worse in the am and pm)       Assessment and Plan   1. Acute recurrent pansinusitis  - predniSONE (DELTASONE) 20 MG tablet; Take 40 mg by mouth daily for 5 days Take with food..  Dispense: 10 tablet; Refill: 0  - amoxicillin (AMOXIL) 500 MG capsule; Take 2 capsules (1,000 mg total) by mouth 3 (three) times a day for 10 days. Take with food.  Dispense: 60 capsule; Refill: 0      Was treated with Augmentin by Primary Care in early March and Bactrim by ENT in later March. Experienced severe diarrhea from the Augmentin and problematic insomnia from the Bactrim. Therefore treating with amoxicillin and prednisone as listed above. Recommended that patient continue her chronic allergy medications.     Counseled patient regarding assessment and plan for evaluation and treatment. Questions were answered. See AVS for the specific written instructions and educational handout(s) regarding sinusitis that were provided at the conclusion of the visit.     Discussed signs / symptoms that warrant urgent / emergent medical attention.     Follow up as needed.      History of Present Illness   Primary symptom: Sinus problem  Onset: 3 weeks ago  Progression: Worsening  Congestion: Yes  Nasal discharge: Yes, green  Dental pain: Yes  Maxillary sinus pressure: Yes  Frontal sinus pressure: Yes  Headache: Yes  Fevers: No  Chills: No  Additional symptoms: Sore throat secondary to post-nasal drip. Also fatigued. No cough.   Home therapies utilized: Saline rinses.   History of sinusitis: Yes  History of sinus surgery: Yes  History of environmental allergies: Yes  Ill contacts: Children  Tobacco user / exposure: No     Review of Systems   Review of Systems    All other systems reviewed and are negative.       Physical Exam   Vitals:    06/01/19 1103   BP: 110/72   Patient Site: Right Arm   Patient Position: Sitting   Cuff Size: Adult Regular   Pulse: 81   Temp: 98.1  F (36.7  C)   TempSrc: Oral   SpO2: 95%   Weight: 171 lb 1.6 oz (77.6 kg)     Physical Exam   Constitutional: She is oriented to person, place, and time. She appears well-developed and well-nourished.  Non-toxic appearance. She does not appear ill. No distress.   HENT:   Head: Normocephalic and atraumatic.   Right Ear: Tympanic membrane, external ear and ear canal normal.   Left Ear: Tympanic membrane, external ear and ear canal normal.   Nose: No mucosal edema or rhinorrhea. Right sinus exhibits maxillary sinus tenderness and frontal sinus tenderness. Left sinus exhibits maxillary sinus tenderness and frontal sinus tenderness.   Mouth/Throat: Uvula is midline, oropharynx is clear and moist and mucous membranes are normal. No oral lesions.   Eyes: Conjunctivae and lids are normal.   Neck: Neck supple. No edema and no erythema present.   Cardiovascular: Normal rate, regular rhythm, S1 normal and S2 normal. Exam reveals no gallop and no friction rub.   No murmur heard.  Pulmonary/Chest: Effort normal and breath sounds normal. No stridor. She has no wheezes. She has no rhonchi. She has no rales.   Lymphadenopathy:     She has cervical adenopathy.        Right cervical: No superficial cervical, no deep cervical and no posterior cervical adenopathy present.       Left cervical: Superficial cervical (single non-tender, non-matted lymph node) adenopathy present. No deep cervical and no posterior cervical adenopathy present.   Neurological: She is alert and oriented to person, place, and time.   Skin: Skin is warm and dry. No rash noted. She is not diaphoretic. No pallor.   Nursing note and vitals reviewed.       Diagnostic Studies   Laboratory:  N/A  Radiology:  N/A  Electrocardiogram:  N/A     Procedure Note    N/A     Pertinent History   The following portions of the patient's history were reviewed and updated as appropriate: allergies, current medications, past family history, past medical history, past social history, past surgical history and problem list.     Memo Gutiérrez MD  [facility]

## 2021-05-30 VITALS — WEIGHT: 157.1 LBS | BODY MASS INDEX: 25.55 KG/M2

## 2021-05-30 NOTE — PROGRESS NOTES
Preoperative Exam    Scheduled Procedure: Endometrial ablation + removal of IUD  Surgery Date:  7/17/19  Surgery Location: [facility], [place], operated by [facility] [place]    Surgeon:  Dr. Valerio    Assessment/Plan:     Planned endometrial ablation for menorrhagia with planned removal of IUD.  Patient approved for surgery with general or local anesthesia.      Problem List Items Addressed This Visit        Medium    Iron deficiency anemia    Relevant Orders    HM2(CBC w/o Differential)    Basic Metabolic Panel    Menorrhagia    Relevant Orders    HM2(CBC w/o Differential)    Basic Metabolic Panel       Low    PVC (premature ventricular contraction)    Relevant Orders    Electrocardiogram Perform and Read (Completed)    Electrocardiogram Perform and Read (Completed)      Other Visit Diagnoses     Preoperative examination    -  Primary        Surgical Procedure Risk: Low (reported cardiac risk generally < 1%)  Have you had prior anesthesia?: Yes  Have you or any family members had a previous anesthesia reaction:  Yes: POSTOP NAUSEA + VOMITING. No other personal or family history of anesthesia problems.  Do you or any family members have a history of a clotting or bleeding disorder?: No  Cardiac Risk Assessment: no increased risk for major cardiac complications  Functional Status: Independent  Patient plans to recover at home with family.     Subjective:      Sandhya Caal is a 39 y.o. female who presents for a preoperative consultation.      She is planning an endometrial ablation. She has had heavy vaginal bleeding since the birth of her son 5 years ago. She is experiencing iron deficiency anemia. IUD is causing breast tenderness and persistent spotting. Has had a tubal ligation (using IUD only for menorrhagia) and plans removal of the IUD during the procedure.    All other systems reviewed and are negative, other than those listed in the HPI.    Pertinent History  Do you have difficulty breathing or chest pain after walking up a flight of  stairs: No  History of obstructive sleep apnea: No  Steroid use in the last 6 months: Yes: for sinus infection in early June, 2019. Last use of inhaler was weeks ago.  Frequent Aspirin/NSAID use: Yes. Rare.  Prior Blood Transfusion: No  Prior Blood Transfusion Reaction: n/a  If for some reason prior to, during or after the procedure, if it is medically indicated, would you be willing to have a blood transfusion?:  There is no transfusion refusal.    Current Outpatient Medications   Medication Sig Dispense Refill     albuterol (VENTOLIN HFA) 90 mcg/actuation inhaler Inhale 2 puffs every 4 (four) hours as needed. 18 g 1     beclomethasone (QVAR) 80 mcg/actuation inhaler Inhale 2 puffs 2 (two) times a day. 3 Inhaler 4     cholecalciferol, vitamin D3, 2,000 unit capsule Take 1,000 Units by mouth daily.       fexofenadine (ALLEGRA) 180 MG tablet        fluticasone (FLONASE) 50 mcg/actuation nasal spray APPLY TWO SPRAYS IN EACH NOSTRIL EVERY DAY 48 g 4     gabapentin (NEURONTIN) 100 MG capsule Take 100 mg by mouth 3 (three) times a day as needed (burning pain in thighs). 30 capsule 2     Lactobacillus rhamnosus GG (CULTURELLE) 10-15 Billion cell capsule Take 1 capsule by mouth daily.       levonorgestrel (MIRENA) 20 mcg/24 hr (5 years) IUD by Intrauterine route once for 1 dose. 1 each 0     montelukast (SINGULAIR) 10 mg tablet TAKE ONE TABLET BY MOUTH EVERY DAY 90 tablet 4     sertraline (ZOLOFT) 50 MG tablet TAKE 3 TABLETS (150 MG TOTAL) BY MOUTH DAILY. 270 tablet 2     cyanocobalamin, vitamin B-12, (VITAMIN B-12) 1,000 mcg tablet Take 1,000 mcg by mouth daily. 100 tablet 4     No current facility-administered medications for this visit.         Allergies   Allergen Reactions     Morphine      Theophylline Dizziness and Palpitations     Annotation: Likely a toxicity issue       Active Non-Hospital Problems    Diagnosis     Menorrhagia     Iron deficiency anemia     Due to menorrhagia. Treated with Mirena IUD.       TMJ  arthralgia     Generalized anxiety disorder     Mild intermittent asthma without complication     Exercise induced.       Fibromyalgia     Allergies     Dyshidrotic eczema     Left 4th finger       PVC (premature ventricular contraction)     Paniagua neuroma, left     Migraine Headache     Past Medical History:   Diagnosis Date     Major Depression, Recurrent        Past Surgical History:   Procedure Laterality Date     SINUS SURGERY Right 02/2006    postoperative nausea + vomiting. Postop pain severe.     TUBAL LIGATION  12/2016    with lysis of adhesions. Dr. I[facility]       Social History     Socioeconomic History     Marital status:      Spouse name: Newton Caal     Number of children: 2     Years of education: Bachelors degree     Highest education level: Not on file   Occupational History     Occupation: Coding     Employer: [employer]   Social Needs     Financial resource strain: Not on file     Food insecurity:     Worry: Not on file     Inability: Not on file     Transportation needs:     Medical: Not on file     Non-medical: Not on file   Tobacco Use     Smoking status: Never Smoker     Smokeless tobacco: Never Used   Substance and Sexual Activity     Alcohol use: Yes     Comment: 2/week     Drug use: No     Sexual activity: Yes     Partners: Male     Birth control/protection: Surgical, IUD     Comment: Tubal ligation 2016, IUD for menorrhagia 2017   Lifestyle     Physical activity:     Days per week: Not on file     Minutes per session: Not on file     Stress: Not on file   Relationships     Social connections:     Talks on phone: Not on file     Gets together: Not on file     Attends [employer] service: Not on file     Active member of club or organization: Not on file     Attends meetings of clubs or organizations: Not on file     Relationship status: Not on file     Intimate partner violence:     Fear of current or ex partner: Not on file     Emotionally abused: Not on file      Physically abused: Not on file     Forced sexual activity: Not on file   Other Topics Concern     Not on file   Social History Narrative    : Newton Caal    Kids: Luiz 2012, Leroy 2014.    Diet: Increasing protein, eats fruits and veggies.    Exercise: yoga, running, weights. Once a week.         Objective:     Vitals:    06/28/19 1032   BP: 120/80   Pulse: 84   Resp: 20   Weight: 174 lb (78.9 kg)   LMP: 06/08/2019     Physical Exam  /80 (Patient Site: Right Arm, Patient Position: Sitting, Cuff Size: Adult Regular)   Pulse 84   Resp 20   Wt 174 lb (78.9 kg)   LMP 06/08/2019   BMI 28.30 kg/m     Social History     Tobacco Use   Smoking Status Never Smoker   Smokeless Tobacco Never Used     GENERAL: Alert, NAD.  HEAD: NC/AT.  EARS: Normal canal, pinnae and tympanic membrane. Bilateral serous effusion.  EYES: PERRL, normal fundi.  NOSE: Pale boggy mucosa.  MOUTH: Adequate dentition, normal throat.  NECK: normal thyroid, midline trachea.  BREASTS: not examined.  LUNGS: CTA bilaterally, no increased work of breathing.  HEART: RRR, no m/r/g  ABDOMEN: soft, nt/nd, BS+  : Not examined  MSK: No significant deformity.  SKIN: no atypical lesion or rash.  LYMPHATICS: no lymphadenopathy.   PSYCH: normal affect.    Labs:  No results found for this or any previous visit (from the past 24 hour(s)).     Immunization History   Administered Date(s) Administered     DT (pediatric) 03/21/1994, 10/01/2005     HPV Quadrivalent 04/21/2008     Hep B, Adult 04/29/1997, 10/03/1997, 08/23/2012     IG-IM 06/13/1997     IPV 06/13/1997     Influenza, inj, historic,unspecified 09/20/2016, 09/15/2017, 09/20/2018     Influenza, seasonal,quad inj 36+ mos 10/02/2015     Influenza, seasonal,quad inj 6-35 mos 10/14/2010     Influenza,seasonal, Inj IIV3 10/18/2007, 10/28/2008, 10/05/2009, 10/14/2010, 10/01/2011, 09/13/2012, 10/13/2012, 10/09/2013     MMR 06/25/1996, 07/23/2012     Pneumo Polysac 23-V 08/23/2012     Rubella 01/01/1900      Td, Adult, Absorbed 10/04/2006     Tdap 03/01/2012, 04/15/2014     Varicella 01/01/1900           Electronically signed by Amira Kevin MD 06/28/19 10:51 AM

## 2021-05-31 VITALS — HEIGHT: 66 IN | BODY MASS INDEX: 26.2 KG/M2 | WEIGHT: 163 LBS

## 2021-05-31 VITALS — BODY MASS INDEX: 26.67 KG/M2 | WEIGHT: 164 LBS

## 2021-05-31 VITALS — BODY MASS INDEX: 27.65 KG/M2 | WEIGHT: 170 LBS

## 2021-06-01 VITALS — WEIGHT: 164 LBS | BODY MASS INDEX: 26.67 KG/M2

## 2021-06-02 VITALS — WEIGHT: 172 LBS | BODY MASS INDEX: 27.97 KG/M2

## 2021-06-02 VITALS — BODY MASS INDEX: 27.65 KG/M2 | WEIGHT: 170 LBS

## 2021-06-03 VITALS
DIASTOLIC BLOOD PRESSURE: 78 MMHG | HEART RATE: 100 BPM | SYSTOLIC BLOOD PRESSURE: 127 MMHG | TEMPERATURE: 97.8 F | RESPIRATION RATE: 18 BRPM | OXYGEN SATURATION: 98 % | BODY MASS INDEX: 27.24 KG/M2 | WEIGHT: 167.5 LBS

## 2021-06-03 VITALS — BODY MASS INDEX: 27.83 KG/M2 | WEIGHT: 171.1 LBS

## 2021-06-03 VITALS — WEIGHT: 174 LBS | BODY MASS INDEX: 28.3 KG/M2

## 2021-06-03 VITALS — BODY MASS INDEX: 28.3 KG/M2 | WEIGHT: 174 LBS

## 2021-06-04 VITALS
WEIGHT: 165.25 LBS | OXYGEN SATURATION: 98 % | RESPIRATION RATE: 20 BRPM | DIASTOLIC BLOOD PRESSURE: 77 MMHG | SYSTOLIC BLOOD PRESSURE: 113 MMHG | HEART RATE: 95 BPM | TEMPERATURE: 98.4 F | BODY MASS INDEX: 26.88 KG/M2

## 2021-06-05 VITALS
TEMPERATURE: 97.8 F | HEART RATE: 68 BPM | SYSTOLIC BLOOD PRESSURE: 120 MMHG | HEIGHT: 66 IN | BODY MASS INDEX: 26.68 KG/M2 | WEIGHT: 166 LBS | DIASTOLIC BLOOD PRESSURE: 78 MMHG | RESPIRATION RATE: 16 BRPM

## 2021-06-05 VITALS
SYSTOLIC BLOOD PRESSURE: 123 MMHG | OXYGEN SATURATION: 98 % | DIASTOLIC BLOOD PRESSURE: 84 MMHG | HEART RATE: 100 BPM | TEMPERATURE: 98.9 F | BODY MASS INDEX: 26.63 KG/M2 | WEIGHT: 165 LBS | RESPIRATION RATE: 18 BRPM

## 2021-06-05 NOTE — TELEPHONE ENCOUNTER
Refill Approved    Rx renewed per Medication Renewal Policy. Medication was last renewed on 1/18/19.    Leonora Cadena, [facility] Connection Triage/Med Refill 1/31/2020     Requested Prescriptions   Pending Prescriptions Disp Refills     montelukast (SINGULAIR) 10 mg tablet [Pharmacy Med Name: MONTELUKAST SODIUM 10MG TABS] 90 tablet 4     Sig: TAKE ONE TABLET BY MOUTH EVERY DAY       There is no refill protocol information for this order        fluticasone propionate (FLONASE) 50 mcg/actuation nasal spray [Pharmacy Med Name: FLUTICASONE 50MCG NASAL SPRAY] 48 g 4     Sig: USE TWO SPRAYS IN EACH NOSTRIL ONCE DAILY       Nasal Steroid Refill Protocol Passed - 1/30/2020  4:35 PM        Passed - Patient has had office visit/physical in last 2 years     Last office visit with prescriber/PCP: 2/2/2018 OR same dept: Visit date not found OR same specialty: 9/27/2018 Cruzito Moura MD Last physical: 6/28/2019 Last MTM visit: Visit date not found    Next appt within 3 mo: Visit date not found  Next physical within 3 mo: Visit date not found  Prescriber OR PCP: Amira Kevin MD  Last diagnosis associated with med order: 1. Chronic nonseasonal allergic rhinitis due to pollen  - montelukast (SINGULAIR) 10 mg tablet [Pharmacy Med Name: MONTELUKAST SODIUM 10MG TABS]; TAKE ONE TABLET BY MOUTH EVERY DAY  Dispense: 90 tablet; Refill: 4  - fluticasone propionate (FLONASE) 50 mcg/actuation nasal spray [Pharmacy Med Name: FLUTICASONE 50MCG NASAL SPRAY]; USE TWO SPRAYS IN EACH NOSTRIL ONCE DAILY  Dispense: 48 g; Refill: 4     If protocol passes may refill for 12 months if within 3 months of last provider visit (or a total of 15 months).

## 2021-06-05 NOTE — TELEPHONE ENCOUNTER
Asthma action plan sent.    In the past 4 weeks, how much of the time did your asthma keep you from getting as much done at work, school, or at home?: A little of the time  During the past 4 weeks, how often have you had shortness of breath?: Once or twice a week  During the past 4 weeks, how often did your asthma symptoms (wheezing, coughing, shortness of breath, chest tightness or pain) wake you up at night or earlier in the morning?: Not at all  During the past 4 weeks, how often have you used your rescue inhaler or nebulizer medication (such as albuterol)?: Once a week or less  How would you rate your asthma control during the past 4 weeks?: Well controlled  ACT Total Score: 21  In the past 12 months, have you visited the emergency room due to your asthma?: No  In the past 12 months, have you been hospitalized due to your asthma?: No

## 2021-06-05 NOTE — TELEPHONE ENCOUNTER
Please do asthma control test for the phone.  Her last one on 10/9/2019 was 19 (it should be more than 20).  Please send this message back to me once the asthma control test is done, thanks.    No future appointments.  Health Maintenance Due   Topic Date Due     ASTHMA ACTION PLAN  1980     DEPRESSION ACTION PLAN  1980     BP Readings from Last 3 Encounters:   10/09/19 127/78   06/28/19 120/80   06/01/19 110/72

## 2021-06-05 NOTE — TELEPHONE ENCOUNTER
RN cannot approve Refill Request    RN can NOT refill this medication med is not covered by policy/route to provider.       Leonora Cadena, [facility] Connection Triage/Med Refill 1/31/2020    Requested Prescriptions   Pending Prescriptions Disp Refills     montelukast (SINGULAIR) 10 mg tablet [Pharmacy Med Name: MONTELUKAST SODIUM 10MG TABS] 90 tablet 4     Sig: TAKE ONE TABLET BY MOUTH EVERY DAY       There is no refill protocol information for this order      Signed Prescriptions Disp Refills    fluticasone propionate (FLONASE) 50 mcg/actuation nasal spray 48 g 3     Sig: USE TWO SPRAYS IN EACH NOSTRIL ONCE DAILY       Nasal Steroid Refill Protocol Passed - 1/30/2020  4:35 PM        Passed - Patient has had office visit/physical in last 2 years     Last office visit with prescriber/PCP: 2/2/2018 OR same dept: Visit date not found OR same specialty: 9/27/2018 Cruzito Moura MD Last physical: 6/28/2019 Last MTM visit: Visit date not found    Next appt within 3 mo: Visit date not found  Next physical within 3 mo: Visit date not found  Prescriber OR PCP: Amira Kevin MD  Last diagnosis associated with med order: 1. Chronic nonseasonal allergic rhinitis due to pollen  - montelukast (SINGULAIR) 10 mg tablet [Pharmacy Med Name: MONTELUKAST SODIUM 10MG TABS]; TAKE ONE TABLET BY MOUTH EVERY DAY  Dispense: 90 tablet; Refill: 4  - fluticasone propionate (FLONASE) 50 mcg/actuation nasal spray; USE TWO SPRAYS IN EACH NOSTRIL ONCE DAILY  Dispense: 48 g; Refill: 3     If protocol passes may refill for 12 months if within 3 months of last provider visit (or a total of 15 months).

## 2021-06-05 NOTE — TELEPHONE ENCOUNTER
Dr. Kevin, I did the pt's ACT over the phone and the score is 21. It is also documented in the flowsheet. Thanks.

## 2021-06-06 NOTE — TELEPHONE ENCOUNTER
RN cannot approve Refill Request    RN can NOT refill this medication med is not covered by policy/route to provider. Last office visit: 2/2/2018 Amira Kevin MD Last Physical: 6/28/2019 Last MTM visit: Visit date not found Last visit same specialty: 9/27/2018 Cruzito Moura MD.  Next visit within 3 mo: Visit date not found  Next physical within 3 mo: Visit date not found      Alida Langford, [facility] Triage/Med Refill 2/19/2020    Requested Prescriptions   Pending Prescriptions Disp Refills     fluconazole (DIFLUCAN) 150 MG tablet [Pharmacy Med Name: FLUCONAZOLE 150 MG TABLET] 2 tablet 0     Sig: TAKE 1 TABLET BY MOUTH ONCE FOR 1 DOSE. REPEAT IN ONE WEEK AS NEEDED       There is no refill protocol information for this order

## 2021-06-08 NOTE — PROGRESS NOTES
ASSESSMENT:  1. Dysuria  Urinalysis-UC if Indicated     Results for orders placed or performed in visit on 02/03/17   Urinalysis-UC if Indicated   Result Value Ref Range    Color, UA Yellow Colorless, Yellow, Straw, Light Yellow    Clarity, UA Clear Clear    Glucose, UA Negative Negative    Bilirubin, UA Negative Negative    Ketones, UA Negative Negative    Specific Gravity, UA 1.020 1.002 - 1.030    Blood, UA Moderate (!) Negative    pH, UA 6.0 4.5 - 8.0    Protein, UA Negative Negative mg/dL    Urobilinogen, UA 0.2 E.U./dL 0.2 E.U./dL, 1.0 E.U./dL    Nitrite, UA Negative Negative    Leukocytes, UA Negative Negative    Bacteria, UA Few (!) None Seen hpf    RBC, UA 5-10 (!) None Seen, 0-2 hpf    WBC, UA 0-5 None Seen, 0-5 hpf    Squam Epithel, UA 0-5 None Seen, 0-5 lpf    Mucus, UA Moderate (!) None Seen lpf       Not clearly  A UTI on UA, will culture and tx if indicated.     PLAN:  Will call with culture results and treat if indicated  Advised adequate rest and hydration with a minimum of 2-3 liters non-caffienated beverage per day.   Advised OTC Uristat or Azo for symptom relief for the first 48 hours.   Follow-up with primary for persistence or worsening of symptoms. Advised if develops fever, chills, nausea, vomiting, or flank pain, return to clinic for further evaluation.       SUBJECTIVE:   Sandhya Caal is a 36 y.o. female presents with 2 days complaint of incomplete bladder emptying. Associated symptoms are: pelvic pressure but she is having her period currently. Denies urinary frequency, fever, chills, flank pain on bilateral, nausea, vomiting and dysuria.  She does not have STD concerns. Declines testing.  No history of pyelonephritis or kidney stones.  Patient has not tried any OTC treatment for her symptoms. She has been working with OB/GYN on vaginal odor. Was tx for vaginal yeast x 2 rounds at the end of dec.     Past Medical History:   Diagnosis Date     Anxiety      Asthma      Depression       Migraines      TMJ arthritis        Current Medications:  Current Outpatient Prescriptions on File Prior to Visit   Medication Sig Dispense Refill     beclomethasone (QVAR) 80 mcg/actuation inhaler Inhale 2 puffs 2 (two) times a day. 3 Inhaler 4     cetirizine (ZYRTEC) 10 MG tablet Take 10 mg by mouth.       cholecalciferol, vitamin D3, 1,000 unit tablet Take 2,000 Units by mouth daily.        CYANOCOBALAMIN, VITAMIN B-12, (VITAMIN B-12 ORAL) Take 1,200 mcg by mouth daily.       fluticasone (FLONASE) 50 mcg/actuation nasal spray USE TWO SPRAYS IN EACH NOSTRIL DAILY 48 g 3     IRON, FERROUS SULFATE, ORAL Take 365 mg by mouth daily.       Lactobacillus rhamnosus GG (CULTURELLE) 10-15 Billion cell capsule Take 1 capsule by mouth daily.       montelukast (SINGULAIR) 10 mg tablet Take 1 tablet (10 mg total) by mouth daily. 90 tablet 3     predniSONE (DELTASONE) 20 MG tablet        sertraline (ZOLOFT) 25 MG tablet TAKE ONE TABLET BY MOUTH ONE TIME DAILY WITH 50 MG TABLET FOR TOTAL DAILY DOSE OF 75 MG 90 tablet 2     sertraline (ZOLOFT) 50 MG tablet TAKE ONE TABLET BY MOUTH ONE TIME DAILY. TAKE WITH 25 MG TABLET FOR A TOTAL DAILY DOSE OF 75 MG 90 tablet 2     VENTOLIN HFA 90 mcg/actuation inhaler INHALE TWO PUFFS BY MOUTH EVERY FOUR HOURS AS NEEDED  18 g 1     amoxicillin-clavulanate (AUGMENTIN) 875-125 mg per tablet        No current facility-administered medications on file prior to visit.        Allergies:  Allergies   Allergen Reactions     Morphine      Theophylline Dizziness and Palpitations     Annotation: Likely a toxicity issue         OBJECTIVE:  Visit Vitals     /68     Pulse 80     Temp 97.8  F (36.6  C) (Oral)     Resp 14     Wt 157 lb 1.6 oz (71.3 kg)     LMP 02/03/2017     SpO2 100%     Breastfeeding No     BMI 25.55 kg/m2       Physical exam reveals a pleasant 36 y.o. female   Appears healthy, alert and cooperative  Lungs: Chest is clear, no wheezing or rales. Symmetric air entry throughout both lung  fields.  Cardiac: regular rate and rhythm, no murmur rub or gallop  Abdomen: soft, no hepatosplenomegaly. Mild pelvic tenderness with palpation. No CVA tenderness

## 2021-06-08 NOTE — PROGRESS NOTES
Subjective findings: The patient return to the clinic today to be casted for orthotics.  She is being treated for Paniagua's neuroma.  A slipper cast was prepared today.

## 2021-06-08 NOTE — PROGRESS NOTES
Subjective findings: The patient returned to the clinic today for orthotics fitting and training.  She is being treated for neuromas.  She was given the orthotics along with instructions.

## 2021-06-08 NOTE — PROGRESS NOTES
Admission History & Physical  Sandhya Caal, 1980, [id number]    [facility]  Amira Kevin MD, [phone]    Extended Emergency Contact Information  Primary Emergency Contact: Newton Caal  Address: [address] [place] [address] [facility]  Home Phone: [phone]  Work Phone: [phone]  Relation: Spouse  Secondary Emergency Contact: Ardy Harrist   [facility]  Home Phone: [phone]  Relation: Mother     Assessment and Plan:   Assessment: Neuroma third intermetatarsal space left foot  Plan: I have recommended orthotics  Active Problems:    * No active hospital problems. *      Chief Complaint:  painful second toe left foot      HPI:    Sandhya Caal is a 36 y.o. old female who presented to the clinic today complaining of pain involving her second toe left foot.  She stated that the pain is mild-to-moderate.  She has had this pain approximately 1 year.  She notices a sharp pain  as well as some numbness.  She has no pain with weightbearing and ventilation.  She does have pain while at rest.  She denies any trauma to her left foot.  She has not noticed any redness or swelling of her left foot.  She denies any previous treatment.   History is provided by patient    Medical History  Active Ambulatory (Non-Hospital) Problems    Diagnosis     Menorrhagia     Dyspareunia in female     Iron deficiency anemia     Dyshidrotic eczema     PVC (premature ventricular contraction)     TMJ arthralgia     Paniagua neuroma, left     Polyuria     Generalized anxiety disorder     Asthma     Major Depression, Recurrent     Migraine Headache     Fibromyalgia     Allergies     Past Medical History   Diagnosis Date     Anxiety      Asthma      Depression      Migraines      TMJ arthritis      Patient Active Problem List    Diagnosis Date Noted     Menorrhagia 12/02/2016     Dyspareunia in female 12/02/2016     Iron deficiency anemia 12/02/2016     Dyshidrotic  eczema 12/02/2016     PVC (premature ventricular contraction) 12/02/2016     TMJ arthralgia 10/02/2015     Paniagua neuroma, left 10/02/2015     Polyuria 03/10/2015     Generalized anxiety disorder 01/19/2015     Asthma      Major Depression, Recurrent      Migraine Headache      Fibromyalgia      Allergies      Surgical History  She  has a past surgical history that includes Sinus surgery (Right, 02/2006).   Past Surgical History   Procedure Laterality Date     Sinus surgery Right 02/2006     postoperative nausea + vomiting. Postop pain severe.    Social History  Reviewed, and she  reports that she has never smoked. She has never used smokeless tobacco. She reports that she drinks alcohol. She reports that she does not use illicit drugs.  Social History   Substance Use Topics     Smoking status: Never Smoker     Smokeless tobacco: Never Used     Alcohol use Yes      Comment: 2/week      Allergies  Allergies   Allergen Reactions     Morphine      Theophylline Dizziness and Palpitations     Annotation: Likely a toxicity issue      Family History  Reviewed, and family history includes Arthritis in her mother; Depression in her mother; Endometriosis in her maternal aunt; Heart disease in her maternal grandfather; Hyperlipidemia in her father and mother; Hypertension in her father, mother, and sister; Kidney disease in her paternal grandfather.   Psychosocial Needs  Social History     Social History Narrative    : Newton Caal    Kids: Luiz 2012, Leroy 2014.    Diet: Increasing protein, eats fruits and veggies.    Exercise: yoga, running, weights. Three days a week.     Additional psychosocial needs reviewed per nursing assessment.       Prior to Admission Medications     (Not in a hospital admission)        Review of Systems - Negative     Visit Vitals     Pulse (!) 109     Resp 16     SpO2 96%     Objective findings: Gen.: The patient is alert and in no acute distress     Integument: Nails bilateral feet normal  length and color.      Vascular: DP and PT pulses +2 over 4 bilateral feet. Capillary refill less than 2 seconds.     Neurologic: Negative clonus, negative Babinski bilaterally.  Negative June sign noted second and third intermetatarsal space left foot     Musculoskeletal: Range of motion within normal limits bilaterally. Muscle power is 5 over 5 bilaterally in all compartments.  There is pain on palpation of the third intermetatarsal space left foot.  There is painful range of motion MTPs 1 through 5 left foot.  There is no edema or erythema noted left foot.  No palpable masses noted left foot.     Assessment: Neuroma third intermetatarsal space left foot     Plan: I have recommended orthotics.

## 2021-06-11 NOTE — PROGRESS NOTES
Health Maintenance Exam  [facility] - Family Medicine  Date of Service: 09/24/2020    Subjective   Sandhya Caal is a 40 y.o. female who presents for a routine physical exam.     Current concerns include the following:    Hair loss - a lot, 3-4 times normal, diffuse  Swollen lymph nodes - left cervical  Low grade fever - 99.5F, normally 97s - covid negative  White spots in throat  Sore throat L>R  Dyspnea - doesn't feel like asthma  Body odor, stress sweat  Itchy eyes - possibly allergies  Joint pain MCP and knee  Myalgias in legs, all over  Appetite down  Night sweats  Super fatigued - like mono  Light headed  Heartrate higher than normal - doesn't take much to get over 100 bpm  Earrings make holes puffy  Folliculitis on thighs    Mom has alopecia      Active Non-Hospital Problems    Diagnosis     Menorrhagia     Iron deficiency anemia     Due to menorrhagia. Treated with Mirena IUD.       TMJ arthralgia     Generalized anxiety disorder     Mild intermittent asthma without complication     Exercise induced.       Fibromyalgia     Allergies     Dyshidrotic eczema     Left 4th finger       PVC (premature ventricular contraction)     [place] neuroma, left     Migraine Headache     Past Medical History:   Diagnosis Date     Major Depression, Recurrent       Past Surgical History:   Procedure Laterality Date     SINUS SURGERY Right 02/2006    postoperative nausea + vomiting. Postop pain severe.     TUBAL LIGATION  12/2016    with lysis of adhesions. Dr. Iman Condon OB/Gyn      Current Outpatient Medications   Medication Sig Dispense Refill     albuterol (VENTOLIN HFA) 90 mcg/actuation inhaler Inhale 2 puffs every 4 (four) hours as needed. 18 g 1     beclomethasone (QVAR) 80 mcg/actuation inhaler Inhale 2 puffs 2 (two) times a day. 3 Inhaler 4     cholecalciferol, vitamin D3, 2,000 unit capsule Take 1,000 Units by mouth daily.       cyanocobalamin, vitamin B-12, (VITAMIN B-12) 1,000 mcg tablet Take 1,000  mcg by mouth daily. 100 tablet 4     fexofenadine (ALLEGRA) 180 MG tablet        fluconazole (DIFLUCAN) 150 MG tablet TAKE 1 TABLET BY MOUTH ONCE FOR 1 DOSE. REPEAT IN ONE WEEK AS NEEDED 2 tablet 0     fluticasone propionate (FLONASE) 50 mcg/actuation nasal spray USE TWO SPRAYS IN EACH NOSTRIL ONCE DAILY 48 g 3     gabapentin (NEURONTIN) 100 MG capsule Take 100 mg by mouth 3 (three) times a day as needed (burning pain in thighs). 30 capsule 2     guaiFENesin ER (MUCINEX) 600 mg 12 hr tablet Take 1,200 mg by mouth 2 (two) times a day.       hydrOXYzine pamoate (VISTARIL) 50 MG capsule TAKE 1 CAPSULE (50 MG TOTAL) BY MOUTH AT BEDTIME AS NEEDED FOR ANXIETY (AND INSOMNIA). 50 capsule 1     Lactobacillus rhamnosus GG (CULTURELLE) 10-15 Billion cell capsule Take 1 capsule by mouth daily.       montelukast (SINGULAIR) 10 mg tablet TAKE ONE TABLET BY MOUTH EVERY DAY 90 tablet 4     sertraline (ZOLOFT) 50 MG tablet TAKE 3 TABLETS BY MOUTH ONCE DAILY 270 tablet 1     levoFLOXacin (LEVAQUIN) 750 MG tablet Take 1 tablet (750 mg total) by mouth daily for 10 days. 10 tablet 0     levonorgestrel (MIRENA) 20 mcg/24 hr (5 years) IUD by Intrauterine route once for 1 dose. 1 each 0     No current facility-administered medications for this visit.       Allergies   Allergen Reactions     Morphine      Theophylline Dizziness and Palpitations     Annotation: Likely a toxicity issue        Social History     Socioeconomic History     Marital status:      Spouse name: Newton Caal     Number of children: 2     Years of education: Bachelors degree     Highest education level: Not on file   Occupational History     Occupation: Coding     Employer: [employer]   Social Needs     Financial resource strain: Not on file     Food insecurity     Worry: Not on file     Inability: Not on file     Transportation needs     Medical: Not on file     Non-medical: Not on file   Tobacco Use     Smoking status: Never Smoker     Smokeless  tobacco: Never Used   Substance and Sexual Activity     Alcohol use: Yes     Comment: 2/week     Drug use: No     Sexual activity: Yes     Partners: Male     Birth control/protection: Surgical, I.U.D.     Comment: Tubal ligation 2016, IUD for menorrhagia 2017   Lifestyle     Physical activity     Days per week: Not on file     Minutes per session: Not on file     Stress: Not on file   Relationships     Social connections     Talks on phone: Not on file     Gets together: Not on file     Attends Yarsanism service: Not on file     Active member of club or organization: Not on file     Attends meetings of clubs or organizations: Not on file     Relationship status: Not on file     Intimate partner violence     Fear of current or ex partner: Not on file     Emotionally abused: Not on file     Physically abused: Not on file     Forced sexual activity: Not on file   Other Topics Concern     Not on file   Social History Narrative    : Newton Caal    Kids: Luiz 2012, Leroy 2014.    Diet: Increasing protein, eats fruits and veggies.    Exercise: yoga, running, weights. Once a week.      Family History   Problem Relation Age of Onset     Hypertension Mother      Depression Mother      Hyperlipidemia Mother      Other Mother         Back problems - spinal stenosis. Multiple surgeries. Bronchiectasis. Lichen sclerosis     Osteoarthritis Mother      Kidney disease Mother         Long term Vioxx use     Hypertension Father      Hyperlipidemia Father      Arrhythmia Father         Atrial fibrillation, had ablation     Memory loss Father      Osteoarthritis Father      Hypertension Sister      Anxiety disorder Sister      Depression Sister      Depression Brother         seasonal     Asthma Son      Rheum arthritis Maternal Grandmother      Heart disease Maternal Grandfather      Kidney disease Paternal Grandfather      Anxiety disorder Sister      No Medical Problems Son      Endometriosis Maternal Aunt       REVIEW OF  "SYSTEMS: negative, except as listed in subjective above.    Physical Exam   /78 (Patient Site: Left Arm, Patient Position: Sitting, Cuff Size: Adult Regular)   Pulse 68   Temp 97.8  F (36.6  C) (Oral)   Resp 16   Ht 5' 6\" (1.676 m)   Wt 166 lb (75.3 kg)   LMP 09/24/2020 (Exact Date)   BMI 26.79 kg/m     Social History     Tobacco Use   Smoking Status Never Smoker   Smokeless Tobacco Never Used     General: Alert, NAD.  Head: NC/AT.  Ears: Normal canal, pinnae and tympanic membrane.  Eyes: PERRL, normal fundi.  Nose: Normal mucosa.  Mouth: Adequate dentition, normal throat.  Neck: normal thyroid, midline trachea.  Breasts: no masses, skin changes, nipple discharge or tenderness.  Lungs: CTA bilaterally, no increased work of breathing.  Heart: RRR, no m/r/g  Abdomen: soft, nt/nd, BS+  Genitourinary: Normal vulva, normal vaginal mucosa, cervix normal appearance. No cervical or adnexal tenderness. No adnexal fullness.  Musculoskelatal: No significant deformity.  Skin: no atypical lesion or rash.  Lymphatics: no lymphadenopathy.   Psych: normal affect.    Physical on 09/24/2020   Component Date Value Ref Range Status     Sed Rate 09/24/2020 13  0 - 20 mm/hr Final     QTF RESULT 09/24/2020 Negative  Negative Final     QTF INTREPRETATION 09/24/2020 No interferon-gamma response to M. tuberculosis antigens was detected.  Infecton with M. tuberculosis is unlikely.  A negative result alone does not exclude infection with M. tuberculosis   Final     QTF NIL 09/24/2020 0.04  IU/mL Final     QTF ANTIGEN TB1-NIL 09/24/2020 0.06  IU/mL Final     QTF ANTIGEN TB2 - NIL 09/24/2020 0.04  IU/mL Final     QTF MITOGEN-NIL 09/24/2020 5.40  IU/mL Final     Rapid Strep A Antigen 09/24/2020 No Group A Strep detected, presumptive negative  No Group A Strep detected, presumptive negative Final     COVID-19 Antibody Screen 09/24/2020 Negative   Final    No COVID-19 antibodies detected.  Patients within 10 days of symptom onset " for  COVID-19 may not produce sufficient levels of detectable antibodies.  Immunocompromised COVID-19 patients may take longer to develop antibodies.     COVID-19 IgG Titer 09/24/2020 Not Applicable   Final    Comment: Qualitative screen for total antibodies to COVID-19 (SARS-CoV-2) with  semi-quantitative measurement of IgG COVID-19 antibodies by endpoint titer.  COVID-19 antibodies may be elevated due to a past or current infection.  Negative results do not rule out COVID-19 infection.  Results from antibody  testing should not be used as the sole basis to diagnose or exclude SARS-CoV-2  infection or to inform infection status.  COVID-19 PCR test should be ordered  if current infection is suspected.  False positive results may occur in rare  cases due to cross-reacting antibodies.  This test was developed and its performance characteristics determined by the  [facility] ([facility]),  which is regulated under CLIA as qualified to perform high-complexity testing.  This test has not been reviewed by the FDA.  Testing performed by [facility], [facility], [address], [address], [place], [place]                            [id number]     Group A Strep by PCR 09/24/2020 No Group A Strep rRNA detected  No Group A Strep rRNA detected Final   Lab on 09/23/2020   Component Date Value Ref Range Status     WBC 09/23/2020 7.9  4.0 - 11.0 thou/uL Final     RBC 09/23/2020 4.42  3.80 - 5.40 mill/uL Final     Hemoglobin 09/23/2020 13.3  12.0 - 16.0 g/dL Final     Hematocrit 09/23/2020 39.1  35.0 - 47.0 % Final     MCV 09/23/2020 88  80 - 100 fL Final     MCH 09/23/2020 30.0  27.0 - 34.0 pg Final     MCHC 09/23/2020 33.9  32.0 - 36.0 g/dL Final     RDW 09/23/2020 11.5  11.0 - 14.5 % Final     Platelets 09/23/2020 255  140 - 440 thou/uL Final     MPV 09/23/2020 6.9* 7.0 - 10.0 fL Final     Rheumatoid Factor Quantitative 09/23/2020  <15.0  0 - 30 IU/mL Final     Sodium 09/23/2020 137  136 - 145 mmol/L Final     Potassium 09/23/2020 3.9  3.5 - 5.0 mmol/L Final     Chloride 09/23/2020 101  98 - 107 mmol/L Final     CO2 09/23/2020 26  22 - 31 mmol/L Final     Anion Gap, Calculation 09/23/2020 10  5 - 18 mmol/L Final     Glucose 09/23/2020 90  70 - 125 mg/dL Final     BUN 09/23/2020 8  8 - 22 mg/dL Final     Creatinine 09/23/2020 0.69  0.60 - 1.10 mg/dL Final     GFR MDRD Af Amer 09/23/2020 >60  >60 mL/min/1.73m2 Final     GFR MDRD Non Af Amer 09/23/2020 >60  >60 mL/min/1.73m2 Final     Bilirubin, Total 09/23/2020 0.5  0.0 - 1.0 mg/dL Final     Calcium 09/23/2020 9.6  8.5 - 10.5 mg/dL Final     Protein, Total 09/23/2020 7.4  6.0 - 8.0 g/dL Final     Albumin 09/23/2020 4.3  3.5 - 5.0 g/dL Final     Alkaline Phosphatase 09/23/2020 56  45 - 120 U/L Final     AST 09/23/2020 12  0 - 40 U/L Final     ALT 09/23/2020 9  0 - 45 U/L Final     TSH 09/23/2020 1.31  0.30 - 5.00 uIU/mL Final     Uric Acid 09/23/2020 2.7  2.0 - 7.5 mg/dL Final     CCP IgG Antibodies 09/23/2020 <0.5  <=4.9 U/mL Final     CRP 09/23/2020 0.8  0.0 - 0.8 mg/dL Final     Lyme Antibody Marin 09/23/2020 0.07  <0.90 Index Value Final     Vitamin D, Total (25-Hydroxy) 09/23/2020 28.5* 30.0 - 80.0 ng/mL Final     Vitamin B-12 09/23/2020 381  213 - 816 pg/mL Final     Iron 09/23/2020 84  42 - 175 ug/dL Final     Transferrin 09/23/2020 224  212 - 360 mg/dL Final     Transferrin Saturation, Calculated 09/23/2020 30  20 - 50 % Final     Transferrin IBC, Calculated 09/23/2020 280* 313 - 563 ug/dL Final     Cholesterol 09/23/2020 193  <=199 mg/dL Final     Triglycerides 09/23/2020 85  <=149 mg/dL Final     HDL Cholesterol 09/23/2020 85  >=50 mg/dL Final     LDL Calculated 09/23/2020 91  <=129 mg/dL Final     Patient Fasting > 8hrs? 09/23/2020 Yes   Final     Color, UA 09/23/2020 Yellow  Colorless, Yellow, Straw, Light Yellow Final     Clarity, UA 09/23/2020 Clear  Clear Final     Glucose, UA  09/23/2020 Negative  Negative Final     Bilirubin, UA 09/23/2020 Negative  Negative Final     Ketones, UA 09/23/2020 Negative  Negative Final     Specific Gravity, UA 09/23/2020 1.020  1.005 - 1.030 Final     Blood, UA 09/23/2020 Negative  Negative Final     pH, UA 09/23/2020 7.0  5.0 - 8.0 Final     Protein, UA 09/23/2020 Negative  Negative mg/dL Final     Urobilinogen, UA 09/23/2020 0.2 E.U./dL  0.2 E.U./dL, 1.0 E.U./dL Final     Nitrite, UA 09/23/2020 Negative  Negative Final     Leukocytes, UA 09/23/2020 Negative  Negative Final   Lab on 09/20/2020   Component Date Value Ref Range Status     COVID-19 VIRUS SPECIMEN SOURCE 09/20/2020 Nasopharyngeal   Final     2019-nCOV 09/20/2020 Not Detected   Final    Comment: Collection of multiple specimens from the same patient may be necessary to  detect the virus. The possibility of a false negative should be considered if  the patient's recent exposure or clinical presentation suggests 2019 nCOV  infection and diagnostic tests for other causes of illness are negative. Repeat  testing may be considered in this setting.  Patient sample was heat inactivated and amplified using the HDPCR SARS-CoV-2  assay ([employer] The HDPCRTM SARS-CoV-2 assay is a reverse  transcription real-time polymerase chain reaction (qRT-PCR) test intended for  the qualitative detection of nucleic acid  from SARS-CoV-2 in human nasopharyngeal swabs, oropharyngeal swabs, anterior  nasal swabs, mid-turbinate nasal swabs as well as nasal aspirate, nasal wash,  and bronchoalveolar lavage (BAL) specimens from individuals who are suspected  of COVID-19 by their healthcare provider.  A negative result does not rule out the presence of real-time PCR inhibitors in  the specimen or                            COVID-19 RNA in concentrations below the limit of detection of  the assay. The possibility of a false negative should be considered if the  patients recent exposure or clinical presentation  suggests COVID-19. Additional  testing or repeat testing requires consultation with the laboratory.  Nasopharyngeal specimen is the preferred choice for swab-based SARS CoV2  testing. When collection of a nasopharyngeal swab is not possible the following  are acceptable alternatives:  an oropharyngeal (OP) specimen collected by a healthcare professional, or a  nasal mid-turbinate (NMT) swab collected by a healthcare professional or by  onsite self-collection (using a flocked tapered swab), or an anterior nares  specimen collected by a healthcare professional or by onsite self-collection  (using a round foam swab). (Centers for Disease Control)  Testing performed by [facility] ([facility]) [address] [place] [id number]  The                            test performance characteristics were determined by [facility]. It has not been  cleared or approved by the FDA.  The laboratory is regulated under the Clinical Laboratory Improvement  Amendments of 1988 (CLIA-88) as qualified to perform high-complexity testing.  This test is used for clinical purposes. It should not be regarded as  investigational or for research.    Performed and/or entered by:  [facility]  [address] [place] [address]        Assessment & Plan   1. Health Maintenance  o Cancer screening: pap obtained  o Bone Health: Discussed Calcium, vitamin D, and weight bearing exercise.  o Immunizations: Reviewed and Updated today.  2. Overweight. Body mass index is 26.79 kg/m .. Discussed diet + exercise.  3. Reproductive plans: Not planning pregnancy. Contraception: sterilizaation.  4. Advance directive: form given. I have had an Advance Directives discussion with the patient..  5. Pharyngitis, lymphadenopathy. Labs as below.    Order Summary                                                      1. Feels feverish  COVID-19 Virus (Coronavirus) Antibody &  Titer Reflex    COVID-19 Virus (Coronavirus) Antibody & Titer Reflex   2. Mild intermittent asthma without complication  albuterol (VENTOLIN HFA) 90 mcg/actuation inhaler    CANCELED: Pneumococcal polysaccharide vaccine 23-valent 1 yo or older, subq/IM   3. Fatigue, unspecified type  COVID-19 Virus (Coronavirus) Antibody & Titer Reflex    Antinuclear Antibody (DAMARIS) Cascade    Erythrocyte Sedimentation Rate    QTF-Mycobacterium tuberculosis by QuantiFERON-TB Gold Plus    COVID-19 Virus (Coronavirus) Antibody & Titer Reflex   4. Pharyngitis, unspecified etiology  COVID-19 Virus (Coronavirus) Antibody & Titer Reflex    Rapid Strep A Screen-Throat    levoFLOXacin (LEVAQUIN) 750 MG tablet    COVID-19 Virus (Coronavirus) Antibody & Titer Reflex    Group A Strep, RNA Direct Detection, Throat   5. Dyspnea, unspecified type  COVID-19 Virus (Coronavirus) Antibody & Titer Reflex    COVID-19 Virus (Coronavirus) Antibody & Titer Reflex   6. Lymphadenopathy  COVID-19 Virus (Coronavirus) Antibody & Titer Reflex    Antinuclear Antibody (DAMARIS) Cascade    Erythrocyte Sedimentation Rate    QTF-Mycobacterium tuberculosis by QuantiFERON-TB Gold Plus    COVID-19 Virus (Coronavirus) Antibody & Titer Reflex   7. Screening for cervical cancer  Gynecologic Cytology (PAP Smear)    HPV High Risk DNA Cervical      No future appointments.    Completed by: Amira Kevin M.D., [facility]. 9/28/2020 9:33 AM.  This transcription uses voice recognition software, which may contain typographical errors.

## 2021-06-12 NOTE — PROGRESS NOTES
Assessment:   The encounter diagnosis was Acute pharyngitis.  Suspect strep throat based on symptoms.     Plan:     Medications Ordered   Medications     penicillin VK (PEN VK) 500 MG tablet     Sig: Take 1 tablet (500 mg total) by mouth 2 (two) times a day for 10 days.     Dispense:  20 tablet     Refill:  0     Patient Instructions     Based on the information that you have provided, I have placed an order for you to start treatment.  View your full visit summary for details. Click on the link below to access your visit summary.    Your pharmacist will address any questions you may have about taking the medication.  If you don't see improvement after 3 days of treatment I would recommend you come in for an appointment to discuss these symptoms. You are able to schedule an appointment within [facility] at your convenience.    If you do need to come in for this same symptom within the next seven days, your eVisit will be free of charge.      No Follow-up on file.    Subjective:   Sandhya Caal is a 37 y.o. female who submitted an eVisit request for evaluation of her Sore Throat.    See the questionnaire and message section of encounter report for information related to history of present illness and review of systems.    The following portions of the patient's history were reviewed and updated as appropriate:  She  does not have any pertinent problems on file.  She is allergic to morphine and theophylline..     Objective:   No exam performed today, patient submitted as eVisit.

## 2021-06-12 NOTE — TELEPHONE ENCOUNTER
RN cannot approve Refill Request    RN can NOT refill this medication med is not covered by policy/route to provider. Last office visit: 2/2/2018 Amira Kevin MD Last Physical: 9/24/2020 Last MTM visit: Visit date not found Last visit same specialty: 9/27/2018 Cruzito Moura MD.  Next visit within 3 mo: Visit date not found  Next physical within 3 mo: Visit date not found      lAida Langford, [facility] Triage/Med Refill 10/4/2020    Requested Prescriptions   Pending Prescriptions Disp Refills     fluconazole (DIFLUCAN) 150 MG tablet 2 tablet 0       There is no refill protocol information for this order

## 2021-06-12 NOTE — PROGRESS NOTES
"Sandhya Caal is a 40 y.o. female who is being evaluated via a billable video visit.      The patient has been notified of following:     \"This video visit will be conducted via a call between you and your physician/provider. We have found that certain health care needs can be provided without the need for an in-person physical exam.  This service lets us provide the care you need with a video conversation.  If a prescription is necessary we can send it directly to your pharmacy.  If lab work is needed we can place an order for that and you can then stop by our lab to have the test done at a later time.    Video visits are billed at different rates depending on your insurance coverage. Please reach out to your insurance provider with any questions.    If during the course of the call the physician/provider feels a video visit is not appropriate, you will not be charged for this service.\"    Patient has given verbal consent to a Video visit? Yes  How would you like to obtain your AVS? AVS Preference: MyChart.  If dropped by the video visit, the video invitation should be sent to: Text to cell phone: [phone]   Will anyone else be joining your video visit? No        ____________________________    Virtual Visit - Video Encounter  [facility]  Family Medicine  Date of Service: 10/8/2020    Subjective:    Went to ER Saturday morning. Woke at 4am. Throat pain, felt crappy. Head CT normal.  Saw Dr. Muñiz - etienne laryngopharyngeal reflux.  Started omeprazole this morning. Elevate head of bed.    Worst symptoms  Fatigue, malaise, feverish, joint pain, rashes.    Sternal pain  Started Tuesday, off to the right side    Yease infection  White discharge, itching around clitoral zamorano    FMLA 9/17/20 first day off. On and off since then. Needs form done.    Objective:  Last menstrual period 09/24/2020, not currently breastfeeding.   GENERAL: sitting comfortably, alert, and in no apparent distress. " RESPIRATORY: No retractions, no nasal flaring, overall work of breathing. No audible wheezing, stridor, cough. SKIN: No rashes, bruising or other skin lesions   No visible edema.  Admission on 10/03/2020, Discharged on 10/03/2020   Component Date Value Ref Range Status     Sodium 10/03/2020 139  136 - 145 mmol/L Final     Potassium 10/03/2020 4.0  3.5 - 5.0 mmol/L Final     Chloride 10/03/2020 110* 98 - 107 mmol/L Final     CO2 10/03/2020 23  22 - 31 mmol/L Final     Anion Gap, Calculation 10/03/2020 6  5 - 18 mmol/L Final     Glucose 10/03/2020 107  70 - 125 mg/dL Final     BUN 10/03/2020 8  8 - 22 mg/dL Final     Creatinine 10/03/2020 0.71  0.60 - 1.10 mg/dL Final     GFR MDRD Af Amer 10/03/2020 >60  >60 mL/min/1.73m2 Final     GFR MDRD Non Af Amer 10/03/2020 >60  >60 mL/min/1.73m2 Final     Bilirubin, Total 10/03/2020 0.3  0.0 - 1.0 mg/dL Final     Calcium 10/03/2020 8.1* 8.5 - 10.5 mg/dL Final     Protein, Total 10/03/2020 6.4  6.0 - 8.0 g/dL Final     Albumin 10/03/2020 3.5  3.5 - 5.0 g/dL Final     Alkaline Phosphatase 10/03/2020 41* 45 - 120 U/L Final     AST 10/03/2020 11  0 - 40 U/L Final     ALT 10/03/2020 <9  0 - 45 U/L Final     WBC 10/03/2020 6.4  4.0 - 11.0 thou/uL Final     RBC 10/03/2020 4.44  3.80 - 5.40 mill/uL Final     Hemoglobin 10/03/2020 13.0  12.0 - 16.0 g/dL Final     Hematocrit 10/03/2020 38.6  35.0 - 47.0 % Final     MCV 10/03/2020 87  80 - 100 fL Final     MCH 10/03/2020 29.3  27.0 - 34.0 pg Final     MCHC 10/03/2020 33.7  32.0 - 36.0 g/dL Final     RDW 10/03/2020 12.6  11.0 - 14.5 % Final     Platelets 10/03/2020 299  140 - 440 thou/uL Final     MPV 10/03/2020 9.7  8.5 - 12.5 fL Final     Neutrophils % 10/03/2020 61  50 - 70 % Final     Lymphocytes % 10/03/2020 32  20 - 40 % Final     Monocytes % 10/03/2020 6  2 - 10 % Final     Eosinophils % 10/03/2020 1  0 - 6 % Final     Basophils % 10/03/2020 0  0 - 2 % Final     Immature Granulocyte % 10/03/2020 0  <=0 % Final     Neutrophils  Absolute 10/03/2020 3.9  2.0 - 7.7 thou/uL Final     Lymphocytes Absolute 10/03/2020 2.0  0.8 - 4.4 thou/uL Final     Monocytes Absolute 10/03/2020 0.4  0.0 - 0.9 thou/uL Final     Eosinophils Absolute 10/03/2020 0.1  0.0 - 0.4 thou/uL Final     Basophils Absolute 10/03/2020 0.0  0.0 - 0.2 thou/uL Final     Immature Granulocyte Absolute 10/03/2020 0.0  <=0.0 thou/uL Final     Beta hCG Qualitative 10/03/2020 Negative  Negative Final     Xr Wrist Right 3 Or More Vws    Result Date: 9/22/2020  EXAM: XR WRIST RIGHT 3 OR MORE VWS LOCATION: [facility] DATE/TIME: 9/22/2020 3:43 PM INDICATION: Right wrist pain (MCP) COMPARISON: None.     Normal joint spaces and alignment. No fracture.    Xr Knee Right 1 Or 2 Vws    Result Date: 9/22/2020  EXAM: XR KNEE RIGHT 1 OR 2 VWS LOCATION: [facility] DATE/TIME: 9/22/2020 3:43 PM INDICATION: Right knee pain COMPARISON: None.     Normal joint spaces and alignment. No fracture or joint effusion.    Ct Soft Tissue Neck With Contrast    Result Date: 10/3/2020  EXAM: CT SOFT TISSUE NECK W CONTRAST LOCATION: [facility] DATE/TIME: 10/3/2020 1:18 PM INDICATION: Left side swelling and mild uvula deviation, sore throat COMPARISON: None. CONTRAST: Iohexol (Omni) 100 mL TECHNIQUE: Routine with IV contrast. Multiplanar reformats. Dose reduction techniques were used. FINDINGS: A marker was placed overlying the left neck in the region of the patient's palpable abnormality. Subjacent to the marker is a normal appearing submandibular gland. No suspicious mass or lesion is demonstrated subjacent to the marker. No surrounding inflammatory changes to suggest cellulitis. The pharyngeal mucosal space of the nasopharynx, oropharynx and hypopharynx is unremarkable without evidence of fluid collection, abscess, discrete or enhancing mass. The supraglottic, glottic, and subglottic larynx is unremarkable. The parapharyngeal spaces, carotid, parotid, ,  submandibular and perivertebral spaces are unremarkable bilaterally. The parotid, submandibular and thyroid glands are unremarkable. Nonpathologically enlarged lymph nodes are demonstrated bilaterally at levels II-V. The partially imaged intracranial contents are unremarkable. The partially imaged globes are unremarkable. The partially imaged paranasal sinuses and mastoid air cells are unremarkable. The partially imaged lung apices are unremarkable. No suspicious osseous lesions. Normal contrast opacification of the vessels within the neck.     1.  A marker was placed overlying the left neck in the region of the patient's palpable abnormality. Subjacent to the marker is a normal appearing submandibular gland. No suspicious mass or lesion is demonstrated subjacent to the marker. No surrounding inflammatory changes to suggest cellulitis. 2.  No evidence of cervical lymphadenopathy by size or morphologic criteria.     Assessment & Plan:  1. Fatigue, feverishness, rash, malaise, costochondritis. Evaluation negative to date. Referral to rheumatology.  2. Nasopharyngeal reflux. Discussed diet + lifestyle modification. Use PPI for up to one month for short term symptom relief.      Order Summary                                                      1. Fatigue, unspecified type  Ambulatory referral to Rheumatology   2. Feels feverish  Ambulatory referral to Rheumatology   3. Rash  Ambulatory referral to Rheumatology   4. Malaise  Ambulatory referral to Rheumatology   5. Costochondritis        No future appointments.    Completed by: Amira Kevin M.D., [facility]. 10/8/2020 9:30 AM.  This transcription uses voice recognition software, which may contain typographical errors.  ____________________________  Start visit: 9:30 AM  End visit: 9:56 AM    Video-Visit Details    Type of service:  Video Visit    Video End Time (time video stopped): 9:56 AM  Originating Location (pt. Location): Home    Distant Location  (provider location):  [facility]     Platform used for Video Visit: Kenny Kevin MD

## 2021-06-12 NOTE — PATIENT INSTRUCTIONS - HE
Limit soap in perineal area  Metronidazole twice a day for 7 days, no alcohol  Use diflucan if needed for new yeast symptoms  GC/Chlamydia pending.   Recheck as needed.

## 2021-06-12 NOTE — PROGRESS NOTES
Assessment/Plan:   Vaginal discharge  Bacterial vaginosis  BV based on wet prep and consistent with exam. gen probe pending. Treat with flagyl. I do not believe empiric treatment for GC/chlamydia is indicated - will treat based on result.   I discussed red flag symptoms, return precautions to clinic/ER and follow up care with patient/parent.  Expected clinical course, symptomatic cares advised. Questions answered. Patient/parent amenable with plan.  - Wet Prep, Vaginal  - Chlamydia trachomatis & Neisseria gonorrhoeae, Amplified Detection  - metroNIDAZOLE (FLAGYL) 500 MG tablet; Take 1 tablet (500 mg total) by mouth 2 (two) times a day for 7 days. No alcohol while on this medication.  Dispense: 14 tablet; Refill: 0    Limit soap in perineal area  Metronidazole twice a day for 7 days, no alcohol  Use diflucan if needed for new yeast symptoms  GC/Chlamydia pending.   Recheck as needed.    Subjective:      Sandhya Caal is a 40 y.o. female who presents for evaluation of vaginal discharge. She had a normal pap smear and physical on 9/24. Was prescribed an antibiotic on 9/25 and then symptoms developed.  She noticed itch and a foul smell. The discharge is white to light yellow, not as clumpy as past yeast infections.  She thought it was a yeast infection and was treated virtually with diflucan on 10/5, last Monday. She noted only slight improvement. She continues to notice the odor off and on, the discharge and itch. The itch is most prominent externally.   No change in soaps or detergents. No swim or baths.   No new sexual partners however her  has been experiencing alcoholism and poor choices in the last months and she is concerned about possible STIs. No sores or blisters no urinary sxs, no abdominal or flank pain. No N/V/D. No URI or cough or shortness of breath.   No ill contacts.   No very high risk covid exposures   Never smoker.     Allergies   Allergen Reactions     Morphine      Theophylline Dizziness  and Palpitations     Annotation: Likely a toxicity issue       Current Outpatient Medications on File Prior to Visit   Medication Sig Dispense Refill     acetaminophen (TYLENOL) 500 MG tablet Take 1,000 mg by mouth every 6 (six) hours as needed for pain.       albuterol (VENTOLIN HFA) 90 mcg/actuation inhaler Inhale 2 puffs every 4 (four) hours as needed. 18 g 1     cholecalciferol, vitamin D3, 2,000 unit capsule Take 2,000 Units by mouth daily.        cyanocobalamin, vitamin B-12, (VITAMIN B-12) 1,000 mcg tablet Take 1,000 mcg by mouth daily as needed.       fexofenadine (ALLEGRA) 180 MG tablet Take 180 mg by mouth daily.        fluticasone propionate (FLONASE) 50 mcg/actuation nasal spray 2 sprays into each nostril daily.       gabapentin (NEURONTIN) 100 MG capsule Take 100 mg by mouth 3 (three) times a day as needed (burning pain in thighs). 30 capsule 2     guaiFENesin ER (MUCINEX) 600 mg 12 hr tablet Take 1,200 mg by mouth 2 (two) times a day as needed.        hydrOXYzine pamoate (VISTARIL) 50 MG capsule TAKE 1 CAPSULE (50 MG TOTAL) BY MOUTH AT BEDTIME AS NEEDED FOR ANXIETY (AND INSOMNIA). 50 capsule 1     Lactobacillus rhamnosus GG (CULTURELLE) 10-15 Billion cell capsule Take 1 capsule by mouth daily.       montelukast (SINGULAIR) 10 mg tablet Take 1 tablet (10 mg total) by mouth daily. 90 tablet 4     omeprazole (PRILOSEC OTC) 20 MG tablet        sertraline (ZOLOFT) 50 MG tablet Take 2 tablets (100 mg total) by mouth daily.       No current facility-administered medications on file prior to visit.      Patient Active Problem List   Diagnosis     Mild intermittent asthma without complication     Migraine Headache     Fibromyalgia     Allergies     Generalized anxiety disorder     TMJ arthralgia     [place] neuroma, left     Menorrhagia     Iron deficiency anemia     Dyshidrotic eczema     PVC (premature ventricular contraction)     Laryngopharyngeal reflux       Objective:     /84 (Patient Site: Left Arm,  Patient Position: Sitting, Cuff Size: Adult Regular)   Pulse 100   Temp 98.9  F (37.2  C) (Oral)   Resp 18   Wt 165 lb (74.8 kg)   LMP 09/24/2020 (Exact Date)   SpO2 98%   Breastfeeding No   BMI 26.63 kg/m      Physical  General Appearance: Alert, cooperative, no distress, AVSS  Head: Normocephalic, without obvious abnormality, atraumatic  Eyes: Conjunctivae are normal.   Abdomen: Soft, non-tender, no masses. Perineum: normal female external genitalia, no lesions, there is mild redness and fishy odor and thin white discharge. Wet prep obtained. Normal appearing cervix, gen probe obtained.   Extremities: No lower extremity edema  Skin: Skin color, texture, turgor normal, no rashes or lesions  Psychiatric: Patient has a normal mood and affect.        Recent Results (from the past 24 hour(s))   Wet Prep, Vaginal    Specimen: Genital   Result Value Ref Range    Yeast Result No yeast seen No yeast seen    Trichomonas No Trichomonas seen No Trichomonas seen    Clue Cells, Wet Prep Clue cells seen (!) No Clue cells seen

## 2021-06-12 NOTE — PROGRESS NOTES
HPI: This patient is a 39yo F who presents for evaluation of the throat at the request of Dr. Kevin. There has been a globus sensation for a few months now and she has felt a lump in her neck also. She was recently in the ER for the neck mass and an increase in her sore throat. A CT done at that time showed normal neck anatomy. She has had relapsing symptoms of fever, fatigue, joint pains, etc., and had workup for Lyme's and other entities Denies fevers, otalgia, weight loss, odynophagia, dysphagia, hemoptysis, and shortness of breath. Does not currently complain of sour taste, heartburn, or burping; she has had issues with reflux in the past successfully treated with medications before. Is not currently taking reflux medication.    Past medical history, surgical history, social history, family history, medications, and allergies have been reviewed with the patient and are documented above.    Review of Systems: a 10-system review was performed. Pertinent positives are noted in the HPI and on a separate scanned document in the chart.    PHYSICAL EXAMINATION:  GEN: no acute distress, normocephalic  EYES: extraocular movements are intact, pupils are equal and round. Sclera clear.   EARS: auricles are normally formed. The external auditory canals are clear with minimal to no cerumen. Tympanic membranes are intact bilaterally with no signs of infection, effusion, retractions, or perforations.  NOSE: anterior nares are patent. There are no masses or lesions. The septum is non-obstructing.  OC/OP: clear, dentition is in good repair. The tongue and palate are fully mobile and symmetric. No masses or lesions. Cobblestoning of the posterior pharyngeal wall.  HP/L (scope): nasopharynx, base of tongue, vallecula, epiglottis, and pyriform sinuses are clear. The bilateral vocal folds are mobile and without lesion. There is interarytenoid edema and erythema.  NECK: soft and supple. No lymphadenopathy or masses. Airway is  midline.  NEURO: CN VII and XII symmetric. alert and oriented. No spontaneous nystagmus. Gait is normal.  PULM: breathing comfortably on room air, normal chest expansion with respiration  CARDS: no cyanosis or clubbing, normal carotid pulses    MEDICAL DECISION-MAKING: This patient is a 41yo F with chronic laryngitis/globus sensation from acid reflux. Discussed diet and lifestyle changes in addition to risks and benefits of short-term H2 blocker vs PPI therapy. As far as the other waxing/waning systemic symptoms, she may benefit from a Rheumatology consult but will defer to her PCP.

## 2021-06-12 NOTE — PATIENT INSTRUCTIONS - HE
Avoid these foods:   fatty, greasy, or oily foods  chocolate  tomatoes and tomato sauce  spices (like onion, pepper, garlic)  citrus like lemon, lime, orange, grapefruit  mint  Avoid these beverages:   alcohol  caffeine  juices (like orange juice, grapefruit juice, tomato juice, apple juice)  coffee  tea  pop  other carbonated beverages  Eat smaller meals.  Don't eat before bedtime.  Avoid tight fitting clothes.  Lose weight.

## 2021-06-13 NOTE — TELEPHONE ENCOUNTER
I want to know a little about you. • What do you do for fun? Reads and plays games   • Any hobbies? Yes patient states. • What Hobbies? Reads and plays games   • What do you do for work?  Yes per patient full time    Social support:  • Do you have someone Please instruct Sandhya:    OK to stop the oral metronidazole.   Drink a lot of fluid and rest as needed.    Monitor symptoms for one week. If symptoms persist, let me know.   eat three small meals a day? Yes per patient  • Breakfast is very important as it sets the tone for the day  • Do you feel you need to work on your eating habits? Yes per patient however she does want to lose around 10 lbs.   o What would you like to change breakfast daily,   walk around the block once a week, cut down on portions for one meal a day. Etc. • What do you think is a barrier or something that may be stopping you from doing this? No patient states. • How can I help you achieve your goals?  P daily  Stress: Minimal to no stress at this time per patient. Current Issue: None at this time per patient  Pain: Pt stated she denies any pain at the moment. Meds: Detailed medication review with Ana Starks.  Discussed with Ana Starks if any potential ba

## 2021-06-13 NOTE — TELEPHONE ENCOUNTER
"Pt calling  Pt started on Flagyl 11/25/2020 (2nd round)  For vaginosis  + dizziness started this morning  worsens with standing  + nausea, is mild  Has 4 doses left of Flagyl   Most symptoms have resolved except mild itching and odor  Vaginal area feels \"inflammed\"  Per protocol call back by PCP today  Reviewed care advice & when to call back  Pt agrees with plan    Reason for Disposition    Caller has NON-URGENT medication question about med that PCP prescribed and triager unable to answer question    Additional Information    Negative: Drug overdose and triager unable to answer question    Negative: Caller requesting information unrelated to medicine    Negative: Caller requesting a prescription for Strep throat and has a positive culture result    Negative: Rash while taking a medication or within 3 days of stopping it    Negative: Immunization reaction suspected    Negative: Asthma and having symptoms of asthma (cough, wheezing, etc.)    Negative: Breastfeeding questions about mother's medicines and diet    Negative: MORE THAN A DOUBLE DOSE of a prescription or over-the-counter (OTC) drug    Negative: DOUBLE DOSE (an extra dose or lesser amount) of over-the-counter (OTC) drug and any symptoms (e.g., dizziness, nausea, pain, sleepiness)    Negative: DOUBLE DOSE (an extra dose or lesser amount) of prescription drug and any symptoms (e.g., dizziness, nausea, pain, sleepiness)    Negative: Took another person's prescription drug    Negative: DOUBLE DOSE (an extra dose or lesser amount) of prescription drug and NO symptoms (Exception: a double dose of antibiotics)    Negative: Diabetes drug error or overdose (e.g., took wrong type of insulin or took extra dose)    Negative: Caller has medication question about med not prescribed by PCP and triager unable to answer question (e.g., compatibility with other med, storage)    Negative: Request for URGENT new prescription or refill of 'essential' medication (i.e., " likelihood of harm to patient if not taken) and triager unable to fill per department policy    Negative: Prescription not at pharmacy and was prescribed today by PCP    Negative: Pharmacy calling with prescription questions and triager unable to answer question    Negative: Caller has URGENT medication question about med that PCP prescribed and triager unable to answer question    Protocols used: MEDICATION QUESTION CALL-A-[place]    COVID 19 Nurse Triage Plan/Patient Instructions    Please be aware that novel coronavirus (COVID-19) may be circulating in the community. If you develop symptoms such as fever, cough, or SOB or if you have concerns about the presence of another infection including coronavirus (COVID-19), please contact your health care provider or visit [email or web address].     Disposition/Instructions    Virtual Visit with provider recommended. Reference Visit Selection Guide.    Thank you for taking steps to prevent the spread of this virus.  o Limit your contact with others.  o Wear a simple mask to cover your cough.  o Wash your hands well and often.    Resources    [facility]: About COVID-19: [email or web address]    CDC: What to Do If You're Sick: www.cdc.gov/coronavirus/2019-ncov/about/steps-when-sick.html    CDC: Ending Home Isolation: www.cdc.gov/coronavirus/2019-ncov/hcp/disposition-in-home-patients.html     CDC: Caring for Someone: www.cdc.gov/coronavirus/2019-ncov/if-you-are-sick/care-for-someone.html     [facility]: Interim Guidance for Hospital Discharge to Home: www.health.[facility].[place]/diseases/coronavirus/hcp/hospdischarge.pdf    [facility] clinical trials (COVID-19 research studies): [email or web address]/umn-clinical-trials     Below are the COVID-19 hotlines at the [place] Department of Health ([facility]). Interpreters are available.   o For health questions: Call [phone] or 1-577.753.9411 (7 a.m. to 7 p.m.)  o For questions about schools and childcare: Call  [phone] or [phone] (7 a.m. to 7 p.m.)

## 2021-06-13 NOTE — TELEPHONE ENCOUNTER
Called and spoke with Sandhya Caal , Message was given, Sandhya Caal  understood, no further questions.

## 2021-06-13 NOTE — PROGRESS NOTES
SUBJECTIVE:   Sandhya Caal is a 37 y.o. female who presents for a routine physical exam.     Current concerns include the following:    Depression/anxiety - in partial remission on zoloft 75 mg daily. Interested in bumping up dose, not therapy. Stressors of having young children.  Mole under left breast - catching on bras.  Maxillary sinuses tender for 2 weeks. Has allergies. Using neti pot. Green nasal discharge. H/o sinus infections responding better to antibiotics if she also uses prednisone.  Menorrhagia - taking iron pills. Tubal ligation for contraception. Sees Dr. Valerio. Told to follow up if heavy bleeding continues. Using menstrual cup. Discussed ablation vs. Mirena IUD.    Patient Active Problem List    Diagnosis Date Noted     Menorrhagia 12/02/2016     Priority: Medium     Dyspareunia in female 12/02/2016     Priority: Medium     Iron deficiency anemia 12/02/2016     Priority: Medium     Overview Note:     Due to menorrhagia.       TMJ arthralgia 10/02/2015     Priority: Medium     Generalized anxiety disorder 01/19/2015     Priority: Medium     Mild intermittent asthma without complication      Priority: Medium     Overview Note:     Exercise induced.       Major Depression, Recurrent      Priority: Medium     Migraine Headache      Priority: Medium     Fibromyalgia      Priority: Medium     Allergies      Priority: Medium     Dyshidrotic eczema 12/02/2016     Priority: Low     Overview Note:     Left 4th finger       PVC (premature ventricular contraction) 12/02/2016     Priority: Low     [place] neuroma, left 10/02/2015     Priority: Low     Past Medical History:   Diagnosis Date     Polyuria 3/10/2015      Past Surgical History:   Procedure Laterality Date     SINUS SURGERY Right 02/2006    postoperative nausea + vomiting. Postop pain severe.     TUBAL LIGATION  12/2016    with lysis of adhesions. Dr. Iman Condon [facility]      Current Outpatient Prescriptions   Medication Sig Dispense Refill      beclomethasone (QVAR) 80 mcg/actuation inhaler Inhale 2 puffs 2 (two) times a day. 3 Inhaler 4     cetirizine (ZYRTEC) 10 MG tablet Take 10 mg by mouth.       cholecalciferol, vitamin D3, 1,000 unit tablet Take 1 tablet (1,000 Units total) by mouth daily. 100 tablet 4     cyanocobalamin, vitamin B-12, (VITAMIN B-12) 1,000 mcg tablet Take 1,000 mcg by mouth daily. 100 tablet 4     ferrous sulfate (IRON, FERROUS SULFATE,) 325 (65 FE) MG tablet Take 1 tablet (325 mg total) by mouth daily with breakfast. 100 tablet 4     fluticasone (FLONASE) 50 mcg/actuation nasal spray 2 sprays into each nostril daily. 48 g 4     Lactobacillus rhamnosus GG (CULTURELLE) 10-15 Billion cell capsule Take 1 capsule by mouth daily.       montelukast (SINGULAIR) 10 mg tablet Take 1 tablet (10 mg total) by mouth daily. 90 tablet 4     sertraline (ZOLOFT) 50 MG tablet Take 3 tablets (150 mg total) by mouth daily. 270 tablet 3     VENTOLIN HFA 90 mcg/actuation inhaler INHALE TWO PUFFS BY MOUTH EVERY FOUR HOURS AS NEEDED  18 g 1     amoxicillin-clavulanate (AUGMENTIN) 500-125 mg per tablet Take 1 tablet (500 mg total) by mouth 3 (three) times a day for 7 days. 21 tablet 0     predniSONE (DELTASONE) 20 MG tablet Take 1 tablet (20 mg total) by mouth daily for 5 days. 5 tablet 0     No current facility-administered medications for this visit.       Allergies   Allergen Reactions     Morphine      Theophylline Dizziness and Palpitations     Annotation: Likely a toxicity issue        Social History     Social History     Marital status: Single     Spouse name: Newton Caal     Number of children: 2     Years of education: Bachelors degree     Occupational History      [employer]     Social History Main Topics     Smoking status: Never Smoker     Smokeless tobacco: Never Used     Alcohol use Yes      Comment: 2/week     Drug use: No     Sexual activity: Yes     Partners: Male     Birth control/ protection: Surgical      Comment: Tubal  "ligation 2016     Other Topics Concern     Not on file     Social History Narrative    : Newton Caal    Kids: Luiz 2012, Leroy 2014.    Diet: Increasing protein, eats fruits and veggies.    Exercise: yoga, running, weights. Once a week.      Family History   Problem Relation Age of Onset     Hypertension Mother      Depression Mother      Hyperlipidemia Mother      Arthritis Mother      Other Mother      Back problems - spinal stenosis. Multiple surgeries.     Hypertension Father      Hyperlipidemia Father      Arrhythmia Father      Atrial fibrillation, had ablation     Hypertension Sister      Anxiety disorder Sister      Depression Sister      Depression Brother      seasonal     No Medical Problems Son      Heart disease Maternal Grandfather      Kidney disease Paternal Grandfather      Anxiety disorder Sister      No Medical Problems Son      Endometriosis Maternal Aunt       REVIEW OF SYSTEMS: negative, except as listed in subjective above.    PHYSICAL EXAM:   /70 (Patient Site: Right Arm, Patient Position: Sitting, Cuff Size: Adult Regular)  Pulse 92  Temp 97.5  F (36.4  C) (Oral)   Resp 18  Ht 5' 5.75\" (1.67 m)  Wt 163 lb (73.9 kg)  LMP 10/14/2017  BMI 26.51 kg/m2   History   Smoking Status     Never Smoker   Smokeless Tobacco     Never Used     GENERAL: Alert, NAD.  HEAD: NC/AT.  EARS: Normal canal, pinnae and tympanic membrane.  EYES: PERRL, normal fundi.  NOSE: Normal mucosa. Maxillary sinuses tender.  MOUTH: Adequate dentition, normal throat.  NECK: normal thyroid, midline trachea.  BREASTS: no masses, skin changes, nipple discharge or tenderness.  LUNGS: CTA bilaterally, no increased work of breathing.  HEART: RRR, no m/r/g  ABDOMEN: soft, nt/nd, BS+  : Normal vulva, normal vaginal mucosa, cervix normal appearance. No cervical or adnexal tenderness. No adnexal fullness.  MSK: No significant deformity.  SKIN: no atypical lesion or rash. Pedunculated nevus 5mm inferior and lateral to " left breast benign appearance.  LYMPHATICS: no lymphadenopathy.   PSYCH: normal affect.    No results found for this or any previous visit (from the past 24 hour(s)).  No results found.    ASSESSMENT/PLAN:   1. Cancer screening: Pap smear up to date.  2. Discussed Calcium, vitamin D, and weight bearing exercise.  3. Discussed maintenance of healthy body weight. The following high BMI interventions were performed this visit: encouragement to exercise and lifestyle education regarding diet.  4. Contraception: tubal ligation  5. Advance directive: given, may have done already. Will check to see if they have it at home, blank forms given, too. I have had an Advance Directives discussion with the patient.   6. Depression/anxiety in partial remission. Increase zoloft to 150 mg daily.  7. Acute maxillary sinusitis. Augmentin + prednisone.  8. Benign appearing nevus, getting irritated due to rubbing under bra. Discussed shave bx, if needed.  9. Heavy menstrual bleeding with iron deficiency anemia. Discussed endometrial ablation vs. Mirena.     Sandhya was seen today for annual exam.    Diagnoses and all orders for this visit:    Routine general medical examination at a health care facility  -     cholecalciferol, vitamin D3, 1,000 unit tablet; Take 1 tablet (1,000 Units total) by mouth daily.  -     cyanocobalamin, vitamin B-12, (VITAMIN B-12) 1,000 mcg tablet; Take 1,000 mcg by mouth daily.    Generalized anxiety disorder  -     sertraline (ZOLOFT) 50 MG tablet; Take 3 tablets (150 mg total) by mouth daily.    Mild episode of recurrent major depressive disorder  -     sertraline (ZOLOFT) 50 MG tablet; Take 3 tablets (150 mg total) by mouth daily.    Acute bacterial sinusitis  -     amoxicillin-clavulanate (AUGMENTIN) 500-125 mg per tablet; Take 1 tablet (500 mg total) by mouth 3 (three) times a day for 7 days.  -     predniSONE (DELTASONE) 20 MG tablet; Take 1 tablet (20 mg total) by mouth daily for 5 days.    Mild  intermittent asthma without complication    Iron deficiency anemia due to chronic blood loss  -     ferrous sulfate (IRON, FERROUS SULFATE,) 325 (65 FE) MG tablet; Take 1 tablet (325 mg total) by mouth daily with breakfast.    Chronic nonseasonal allergic rhinitis due to pollen  -     fluticasone (FLONASE) 50 mcg/actuation nasal spray; 2 sprays into each nostril daily.  -     montelukast (SINGULAIR) 10 mg tablet; Take 1 tablet (10 mg total) by mouth daily.

## 2021-06-14 NOTE — TELEPHONE ENCOUNTER
Requested Prescriptions     Pending Prescriptions Disp Refills     QVAR REDIHALER 80 mcg/actuation HFAB inhaler [Pharmacy Med Name: QVAR REDIHALER 80MCG/ACT AERB]  4     Sig: INHALE TWO PUFFS BY MOUTH TWICE A DAY

## 2021-06-14 NOTE — PROGRESS NOTES
Insertion of IUD  Date/Time: 2017 10:51 AM  Performed by: GREGORY ROWE  Authorized by: GREGORY ROWE   Consent: Verbal consent obtained.  Risks and benefits: risks, benefits and alternatives were discussed  Consent given by: patient  Comments: IUD Insertion Procedure Note  PREPROCEDURE    Copper allergy? No  Iodine Allergy? No  [place] monogamy? Yes  Hx of PID/STD? no  Patient's last menstrual period was 2017 (approximate)., ; last pap 2013 - normal  Current birth control: Tubal ligation.    Patient Counseled Regarding:  Mechanism of action  Effectiveness rates and duration  Complications  Insertion and removal procedures  Discussed warning signs for IUD complications  Instructed to check stings monthly, after menses  Instructed IUD must not be in place longer than 5 years    Verbal consent obtained.    Patient pre-medicated with ibuprofen 600 mg once (at home, before visit).    UPT Obtained, result:  Recent Results (from the past 24 hour(s))  -Pregnancy, Urine       Result                                            Value                         Ref Range                       Pregnancy Test, Urine                             Negative                      Negative                        Specific [place], UA                              >=1.030                       1.001 - 1.030              -Wet Prep, Vaginal       Result                                            Value                         Ref Range                       Yeast Result                                      No yeast seen                 No yeast seen                   Trichomonas                                       No Trichomonas seen           No Trichomonas seen             Clue Cells, Wet Prep                              No Clue cells seen            No Clue cells seen           PROCEDURE  Bimanual exam performed and cervix inspected. Uterus normal size and position. Gonorrhea and Chlamydia swab obtained. Cervix cleansed  with betadine x3. Tenaculum applied on anterior lip of cervix. Uterus sounded to 8 cm. Mirena IUD inserted. IUD strings trimmed to 3 cm. Tenaculum removed. Complications: None. No excessive pain or bleeding. Patient tolerated procedure well.    ASSESSMENT  Insertion of Mirena IUD.    PLAN  Return to clinic in 4 weeks PRN for IUD check.

## 2021-06-14 NOTE — PROGRESS NOTES
Subjective    Sandhya Caal is a 37 y.o. female who presents for IUD insertion.    She notes that she has yeast-like vaginal discharge. Prone to bacterial vaginosis. Uses boric acid suppositories to control.  IUD insertion for menorrhagia. Has tubal ligation for contraception.       Objective    Blood pressure 100/68, pulse 72, weight 164 lb (74.4 kg), last menstrual period 11/09/2017, not currently breastfeeding. Body mass index is 26.67 kg/(m^2). Patient reports that she has never smoked. She has never used smokeless tobacco.    Gen: Alert, no apparent distress.  Genitourinary: Vulva, vagina, and cervix are normal in appearance.  No cervical motion tenderness or adnexal tenderness.  No adnexal fullness.     Results for orders placed or performed in visit on 12/01/17   Wet Prep, Vaginal   Result Value Ref Range    Yeast Result No yeast seen No yeast seen    Trichomonas No Trichomonas seen No Trichomonas seen    Clue Cells, Wet Prep No Clue cells seen No Clue cells seen   Pregnancy, Urine   Result Value Ref Range    Pregnancy Test, Urine Negative Negative    Specific Gravity, UA >=1.030 1.001 - 1.030     No results found.       Assessment & Plan      Sandhya is a 37 y.o. female who is here today for Contraception (mirena)      1. Iud inserted without complication - see procedure note. Due for removal in 5 years  - though due to menorrhagia indication + tubal ligation, could extend to 7 years.  2. Vaginal discharge - wet prep done. Rx sent for fluconazole and metronidazole.    1. Encounter for IUD insertion  Pregnancy, Urine    levonorgestrel (MIRENA) 20 mcg/24 hr (5 years) IUD    fluconazole (DIFLUCAN) 150 MG tablet    metroNIDAZOLE (FLAGYL) 500 MG tablet    Gynecologic Cytology (PAP Smear)    Insertion of Intrauterine Device   2. Vaginal discharge  Wet Prep, Vaginal       No future appointments.     This transcription uses voice recognition software, which may contain typographical errors.

## 2021-06-15 NOTE — TELEPHONE ENCOUNTER
RN cannot approve Refill Request    RN can NOT refill this medication Protocol failed and NO refill given. Last office visit: 2/2/2018 Amira Kevin MD Last Physical: 9/24/2020 Last MTM visit: Visit date not found Last visit same specialty: 9/27/2018 Cruzito Moura MD.  Next visit within 3 mo: Visit date not found  Next physical within 3 mo: Visit date not found      Cira Miranda, [facility] Triage/Med Refill 3/7/2021    Requested Prescriptions   Pending Prescriptions Disp Refills     sertraline (ZOLOFT) 50 MG tablet [Pharmacy Med Name: SERTRALINE HCL 50MG TABS] 270 tablet 1     Sig: TAKE THREE TABLETS BY MOUTH ONCE DAILY       [facility] Refill Protocol  Passed - 3/7/2021 10:27 AM        Passed - PCP or prescribing provider visit in last year     Last office visit with prescriber/PCP: 2/2/2018 Amira Kevin MD OR same dept: Visit date not found OR same specialty: 9/27/2018 Cruzito Moura MD  Last physical: 9/24/2020 Last MTM visit: Visit date not found   Next visit within 3 mo: Visit date not found  Next physical within 3 mo: Visit date not found  Prescriber OR PCP: Amira Kevin MD  Last diagnosis associated with med order: 1. Generalized anxiety disorder  - sertraline (ZOLOFT) 50 MG tablet [Pharmacy Med Name: SERTRALINE HCL 50MG TABS]; TAKE THREE TABLETS BY MOUTH ONCE DAILY  Dispense: 270 tablet; Refill: 1    2. Mild episode of recurrent major depressive disorder (H)  - sertraline (ZOLOFT) 50 MG tablet [Pharmacy Med Name: SERTRALINE HCL 50MG TABS]; TAKE THREE TABLETS BY MOUTH ONCE DAILY  Dispense: 270 tablet; Refill: 1    3. Chronic nonseasonal allergic rhinitis due to pollen  - montelukast (SINGULAIR) 10 mg tablet [Pharmacy Med Name: MONTELUKAST SODIUM 10MG TABS]; TAKE ONE TABLET BY MOUTH ONCE DAILY  Dispense: 90 tablet; Refill: 4    If protocol passes may refill for 12 months if within 3 months of last provider visit (or a total of 15 months).                montelukast (SINGULAIR) 10 mg tablet  [Pharmacy Med Name: MONTELUKAST SODIUM 10MG TABS] 90 tablet 4     Sig: TAKE ONE TABLET BY MOUTH ONCE DAILY       There is no refill protocol information for this order

## 2021-06-15 NOTE — PROGRESS NOTES
Subjective    Sandhya Caal is a 37 y.o. female who presents for leg pain, fatigue, and chills.    The patient started having pain, fatigue, and chills since 1/19/18.  Chills are intermittent.  She was extremely tired.  She did not have any significant respiratory symptoms or urinary symptoms.  Bowel movements have been normal for her though she has irritable bowel syndrome and they are normally irregular.  No significant pelvic pain or tenderness.  No abnormal discharge.  Menses have been irregular, about 50% of the time since IUD was inserted in December.  The pain is located across the bilateral anterior lateral mid thighs and is a burning sensation.    The patient was concerned about her symptoms, so she went to the urgency room.  Urine pregnancy test was negative.  Urinalysis was normal.  Wet prep, gonorrhea, and chlamydia were normal.  She had an ultrasound there that showed that her IUD is in proper position.  Hemoglobin was normal.     Objective    Blood pressure 104/70, pulse 76, weight 170 lb (77.1 kg), last menstrual period 01/05/2018, not currently breastfeeding. Body mass index is 27.65 kg/(m^2). Patient reports that she has never smoked. She has never used smokeless tobacco.    Gen: Alert, no apparent distress.  Heart: Regular rate and rhythm, no murmurs.  Lungs: Clear to auscultation bilaterally, no increased work of breathing.  Abdomen: Soft, non-tender, non-distended, bowel sounds normal.  Genitourinary: Vulva, vagina, and cervix are normal in appearance.  No cervical motion tenderness or adnexal tenderness.  No adnexal fullness.   Extremities: No clubbing, cyanosis, edema.    Results for orders placed or performed in visit on 12/01/17   Wet Prep, Vaginal   Result Value Ref Range    Yeast Result No yeast seen No yeast seen    Trichomonas No Trichomonas seen No Trichomonas seen    Clue Cells, Wet Prep No Clue cells seen No Clue cells seen   Pregnancy, Urine   Result Value Ref Range    Pregnancy  Test, Urine Negative Negative    Specific Gravity, UA >=1.030 1.001 - 1.030   Gynecologic Cytology (PAP Smear)   Result Value Ref Range    Case Report       Gynecologic Cytology Report                       Case: [id number]                                   Authorizing Provider:  Amira Kevin MD         Collected:           12/01/2017 0951              Ordering Location:     [facility]  Received:            12/01/2017 0945                                     Medicine/OB                                                                  First Screen:          AUDI Hooper (ASCP)                                                     Rescreen:              AUDI Langford                                                                                (ASCP)                                                                       Specimen:    SUREPATH PAP, SCREENING, Endocervical/cervical                                             Interpretation       Negative for squamous intraepithelial lesion or malignancy    Result Flag Normal Normal    Specimen Adequacy       Satisfactory for evaluation, endocervical/transformation zone component present    HPV Reflex? Yes regardless of result     HIGH RISK No     LMP/Menopause Date 11/9/17     Abnormal Bleeding Yes     Pt Status na     Birth Control/Hormones None     Previous Normal/Date 11/21/13     Prev Abn Date/Dx unknown     Cervical Appearance Normal    HPV Cascade (PCR)   Result Value Ref Range    Interpretation No HPV Type(s) Detected No HPV Type(s) Detected, No High Risk HPV Type(s) Detected, DNA Quantity Not Sufficient     Yeison Myers MD, [employer]      No results found.       Assessment & Plan      Sandhya is a 37 y.o. female who is here today for Contraception (IUD concerns)      1. Bilateral thigh pain, consistent with meralgia paresthetica in the patient who has a baseline of fibromyalgia.  Treat symptomatically with  gabapentin 100 mg, 3 times daily.  She has a history of being sensitive to medications.  2. Fatigue, chills, increase in fibromyalgia pain.  Likely due to a virus, though no clear source.  The patient will continue to monitor the symptoms.  3. IUD in proper placement.  No significant uterine tenderness or abdominal pain to go a long list of been like an endometriosis.  Pregnancy test negative and the patient has no tubes which would rule out tubal pregnancy and pregnancy related complications.  Discussed with the patient that if her symptoms persist, we could always try removal of IUD, but most likely IUD is not related to the above symptoms.  1. Meralgia paresthetica, bilateral lower limbs  gabapentin (NEURONTIN) 100 MG capsule       No future appointments.     This transcription uses voice recognition software, which may contain typographical errors.

## 2021-06-16 NOTE — PROGRESS NOTES
Assessment:   The encounter diagnosis was Left ear pain.     Plan:     Medications Ordered   Medications     fluticasone (FLONASE) 50 mcg/actuation nasal spray     Si spray each nostril once or twice daily.     Dispense:  18 g     Refill:  2     Patient Instructions     Without seeing the ear it is difficult to determine if there is a bacterial infection in the middle ear space or just the fluid secondary to the recent sinus infection.  It is very common to have persistent fluid and it can take up to 6 weeks to resolve. Sometimes adding Flonase/fluticasone nasal spray twice a day can speed that up so I sent that in for you. The other thing is that the temporomandibular joints (TMJ) in front of the ear can become inflamed following a sinus infection causing ongoing ear pain.  If that is the case opening and closing jaw, chewing is painful. Pain is referred to the ear.  Ibuprofen, soft diet, limit chewing, no gum etc all help in that case.  If discomfort is ongoing or worse, will need to be seen but I think it might just need more time as frustrating as that sounds. We are happy to recheck it in [facility] or at your primary clinic anytime if you prefer that.     Return for further follow up if needed. Call [phone] or schedule an appointment via [facility]    Subjective:   Sandhya Caal is a 38 y.o. female who submitted an eVisit request for evaluation of her Ear Pain.  See the questionnaire and message section of encounter report for information related to history of present illness and review of systems.    The following portions of the patient's history were reviewed and updated as appropriate:  She  does not have any pertinent problems on file.  She is allergic to morphine and theophylline..     Objective:   No exam performed today, patient submitted as eVisit.

## 2021-06-16 NOTE — PROGRESS NOTES
Assessment:   The encounter diagnosis was Sinus complaint.     Plan:     Medications Ordered   Medications     amoxicillin (AMOXIL) 875 MG tablet     Sig: Take 1 tablet (875 mg total) by mouth 2 (two) times a day for 10 days.     Dispense:  20 tablet     Refill:  0     Patient Instructions     Based on the information that you have provided, I have placed an order for you to start treatment.  View your full visit summary for details. Click on the link below to access your visit summary.    Your pharmacist will address any questions you may have about taking the medication.  If you don't see improvement after 5 days of treatment I would recommend you come in for an appointment to discuss these symptoms. You are able to schedule an appointment within [facility] at your convenience.    If you do need to come in for this same symptom within the next seven days, your eVisit will be free of charge.      Return for further follow up if needed. Call [phone] or schedule an appointment via [facility]    Subjective:   Sandhya Caal is a 37 y.o. female who submitted an eVisit request for evaluation of her Sinus Problem.  See the questionnaire and message section of encounter report for information related to history of present illness and review of systems.    The following portions of the patient's history were reviewed and updated as appropriate:  She  does not have any pertinent problems on file.  She is allergic to morphine and theophylline..     Objective:   No exam performed today, patient submitted as eVisit.

## 2021-06-17 NOTE — PATIENT INSTRUCTIONS - HE
Patient Instructions by Amira Kevin MD at 3/4/2019 10:23 AM     Author: Amira Kevin MD Service: -- Author Type: Physician    Filed: 3/4/2019 10:23 AM Encounter Date: 3/4/2019 Status: Signed    : Amira Kevin MD (Physician)           Sinusitis (Antibiotic Treatment)    The sinuses are air-filled spaces within the bones of the face. They connect to the inside of the nose. Sinusitis is an inflammation of the tissue that lines the sinuses. Sinusitis can occur during a cold. It can also happen due to allergies to pollens and other particles in the air. Sinusitis can cause symptoms of sinus congestion and a feeling of fullness. A sinus infection causes fever, headache, and facial pain. There is often green or yellow fluid draining from the nose or into the back of the throat (post-nasal drip). You have been given antibiotics to treat this condition.  Home care    Take the full course of antibiotics as instructed. Do not stop taking them, even when you feel better.    Drink plenty of water, hot tea, and other liquids. This may help thin nasal mucus. It also may help your sinuses drain fluids.    Heat may help soothe painful areas of your face. Use a towel soaked in hot water. Or,  the shower and direct the warm spray onto your face. Using a vaporizer along with a menthol rub at night may also help soothe symptoms.     An expectorant with guaifenesin may help thin nasal mucus and help your sinuses drain fluids.    You can use an over-the-counter decongestant, unless a similar medicine was prescribed to you. Nasal sprays work the fastest. Use one that contains phenylephrine or oxymetazoline. First blow your nose gently. Then use the spray. Do not use these medicines more often than directed on the label. If you do, your symptoms may get worse. You may also take pills that contain pseudoephedrine. Dont use products that combine multiple medicines. This is because side effects may be increased. Read  labels. You can also ask the pharmacist for help. (People with high blood pressure should not use decongestants. They can raise blood pressure.)    Over-the-counter antihistamines may help if allergies contributed to your sinusitis.      Do not use nasal rinses or irrigation during an acute sinus infection, unless your healthcare provider tells you to. Rinsing may spread the infection to other areas in your sinuses.    Use acetaminophen or ibuprofen to control pain, unless another pain medicine was prescribed to you. If you have chronic liver or kidney disease or ever had a stomach ulcer, talk with your healthcare provider before using these medicines. (Aspirin should never be taken by anyone under age 18 who is ill with a fever. It may cause severe liver damage.)    Don't smoke. This can make symptoms worse.  Follow-up care  Follow up with your healthcare provider or our staff if you are better in 1 week.  When to seek medical advice  Call your healthcare provider if any of these occur:    Facial pain or headache that gets worse    Stiff neck    Unusual drowsiness or confusion    Swelling of your forehead or eyelids    Vision problems, such as blurred or double vision    Fever of 100.4 F (38 C) or higher, or as directed by your healthcare provider    Seizure    Breathing problems    Symptoms don't go away in 10 days  Prevention  Here are steps you can take to help prevent an infection:    Keep good hand washing habits.    Dont have close contact with people who have sore throats, colds, or other upper respiratory infections.    Dont smoke, and stay away from secondhand smoke.    Stay up to date with of your vaccines.  Date Last Reviewed: 11/1/2017 2000-2017 [employer]. [address] [place] [address], [place] [address]. All rights reserved. This information is not intended as a substitute for professional medical care. Always follow your healthcare professional's instructions.

## 2021-06-17 NOTE — PATIENT INSTRUCTIONS - HE
Patient Instructions by Cabrera Mcdowell PA-C at 10/9/2019  9:00 AM     Author: Cabrera Mcdowell PA-C Service: -- Author Type: Physician Assistant    Filed: 10/9/2019  9:33 AM Encounter Date: 10/9/2019 Status: Signed    : Cabrera Mcdowell PA-C (Physician Assistant)       Suggested increased rest increased fluids and bedside humidification  Over-the-counter Tylenol for comfort.  Follow packaging directions  Over-the-counter throat lozenges with benzocaine such as Cepacol may be used if indicated and is not a choking hazard based on age.  Follow packaging directions.  Do not overuse the benzocaine as it will dry the throat and make it uncomfortable.  Return to primary care provider for reevaluation treatment if any complication or new symptoms should present.      Self-Care for Sore Throats  Sore throats happen for many reasons, such as colds, allergies, and infections caused by viruses or bacteria. In any case, your throat becomes red and sore. Your goal for self-care is to reduce your discomfort while giving your throat a chance to heal.    Moisten and soothe your throat  Tips include the following:    Try a sip of water first thing after waking up.    Keep your throat moist by drinking 6 or more glasses of clear liquids every day.    Run a cool-air humidifier in your room overnight.    Avoid cigarette smoke.     Suck on throat lozenges, cough drops, hard candy, ice chips, or frozen fruit-juice bars. Use the sugar-free versions if your diet or medical condition requires them.  Gargle to ease irritation  Gargling every hour or 2 can ease irritation. Try gargling with 1 of these solutions:    1/4 teaspoon of salt in 1/2 cup of warm water    An over-the-counter anesthetic gargle  Use medicine for more relief  Over-the-counter medicine can reduce sore throat symptoms. Ask your pharmacist if you have questions about which medicine to use:    Ease pain with anesthetic sprays. Aspirin or an aspirin substitute also helps.  Remember, never give aspirin to anyone 18 or younger, or if you are already taking blood thinners.     For sore throats caused by allergies, try antihistamines to block the allergic reaction.    Remember: unless a sore throat is caused by a bacterial infection, antibiotics wont help you.  Prevent future sore throats  Prevention tips include the following:    Stop smoking or reduce contact with secondhand smoke. Smoke irritates the tender throat lining.    Limit contact with pets and with allergy-causing substances, such as pollen and mold.    When youre around someone with a sore throat or cold, wash your hands often to keep viruses or bacteria from spreading.    Dont strain your vocal cords.  Call your healthcare provider  Contact your healthcare provider if you have:    A temperature over 101 F (38.3 C)    White spots on the throat    Great difficulty swallowing    Trouble breathing    A skin rash    Recent exposure to someone else with strep bacteria    Severe hoarseness and swollen glands in the neck or jaw   Date Last Reviewed: 8/1/2016 2000-2016 [employer]. [address] [place] [address], [place] [address]. All rights reserved. This information is not intended as a substitute for professional medical care. Always follow your healthcare professional's instructions.

## 2021-06-17 NOTE — PATIENT INSTRUCTIONS - HE
Patient Instructions by Memo Gutiérrez MD at 6/1/2019 11:00 AM     Author: Memo Gutiérrez MD Service: -- Author Type: Physician    Filed: 6/1/2019 11:30 AM Encounter Date: 6/1/2019 Status: Addendum    : Memo Gutiérrez MD (Physician)    Related Notes: Original Note by Memo Gutiérrez MD (Physician) filed at 6/1/2019 11:30 AM       - Take the full course of amoxicillin and prednisone as prescribed.   - Take a probiotic while taking this antibiotic. This can be purchased over the counter at your local pharmacy.   - Continue your chronic allergy medications.       Patient Education     Sinusitis (Antibiotic Treatment)    The sinuses are air-filled spaces within the bones of the face. They connect to the inside of the nose. Sinusitis is an inflammation of the tissue that lines the sinuses. Sinusitis can occur during a cold. It can also happen due to allergies to pollens and other particles in the air. Sinusitis can cause symptoms of sinus congestion and a feeling of fullness. A sinus infection causes fever, headache, and facial pain. There is often green or yellow fluid draining from the nose or into the back of the throat (post-nasal drip). You have been given antibiotics to treat this condition.  Home care    Take the full course of antibiotics as instructed. Do not stop taking them, even when you feel better.    Drink plenty of water, hot tea, and other liquids. This may help thin nasal mucus. It also may help your sinuses drain fluids.    Heat may help soothe painful areas of your face. Use a towel soaked in hot water. Or,  the shower and direct the warm spray onto your face. Using a vaporizer along with a menthol rub at night may also help soothe symptoms.     An expectorant with guaifenesin may help thin nasal mucus and help your sinuses drain fluids.    You can use an over-the-counter decongestant, unless a similar medicine was prescribed to you. Nasal sprays work the fastest. Use  one that contains phenylephrine or oxymetazoline. First blow your nose gently. Then use the spray. Do not use these medicines more often than directed on the label. If you do, your symptoms may get worse. You may also take pills that contain pseudoephedrine. Dont use products that combine multiple medicines. This is because side effects may be increased. Read labels. You can also ask the pharmacist for help. (People with high blood pressure should not use decongestants. They can raise blood pressure.)    Over-the-counter antihistamines may help if allergies contributed to your sinusitis.      Do not use nasal rinses or irrigation during an acute sinus infection, unless your healthcare provider tells you to. Rinsing may spread the infection to other areas in your sinuses.    Use acetaminophen or ibuprofen to control pain, unless another pain medicine was prescribed to you. If you have chronic liver or kidney disease or ever had a stomach ulcer, talk with your healthcare provider before using these medicines. (Aspirin should never be taken by anyone under age 18 who is ill with a fever. It may cause severe liver damage.)    Don't smoke. This can make symptoms worse.  Follow-up care  Follow up with your healthcare provider or our staff if you are better in 1 week.  When to seek medical advice  Call your healthcare provider if any of these occur:    Facial pain or headache that gets worse    Stiff neck    Unusual drowsiness or confusion    Swelling of your forehead or eyelids    Vision problems, such as blurred or double vision    Fever of 100.4 F (38 C) or higher, or as directed by your healthcare provider    Seizure    Breathing problems    Symptoms don't go away in 10 days  Prevention  Here are steps you can take to help prevent an infection:    Keep good hand washing habits.    Dont have close contact with people who have sore throats, colds, or other upper respiratory infections.    Dont smoke, and stay away from  secondhand smoke.    Stay up to date with of your vaccines.  Date Last Reviewed: 11/1/2017 2000-2017 [facility], [employer]. [address] [facility], [place] [address]. All rights reserved. This information is not intended as a substitute for professional medical care. Always follow your healthcare professional's instructions.

## 2021-06-17 NOTE — TELEPHONE ENCOUNTER
Refill Approved    Rx renewed per Medication Renewal Policy. Medication was last renewed on 10/8/20, last OV 11/25/20.    Cira Miranda, [facility] Triage/Med Refill 5/8/2021     Requested Prescriptions   Pending Prescriptions Disp Refills     fluticasone propionate (FLONASE) 50 mcg/actuation nasal spray [Pharmacy Med Name: FLUTICASONE NASAL SPRAY] 16 g 3     Sig: INSTILL TWO SPRAYS INTO EACH NOSTRIL ONCE DAILY       Nasal Steroid Refill Protocol Passed - 5/7/2021  1:07 PM        Passed - Patient has had office visit/physical in last 2 years     Last office visit with prescriber/PCP: Visit date not found OR same dept: Visit date not found OR same specialty: 9/27/2018 Cruzito Moura MD Last physical: 9/24/2020 Last MTM visit: Visit date not found    Next appt within 3 mo: Visit date not found  Next physical within 3 mo: Visit date not found  Prescriber OR PCP: Amira Kevin MD  Last diagnosis associated with med order: 1. Chronic nonseasonal allergic rhinitis due to pollen  - fluticasone propionate (FLONASE) 50 mcg/actuation nasal spray [Pharmacy Med Name: FLUTICASONE NASAL SPRAY]; INSTILL TWO SPRAYS INTO EACH NOSTRIL ONCE DAILY  Dispense: 16 g; Refill: 3     If protocol passes may refill for 12 months if within 3 months of last provider visit (or a total of 15 months).

## 2021-06-18 NOTE — LETTER
Letter by Deepti Hay PA-C at      Author: Deepti Hay PA-C Service: -- Author Type: --    Filed:  Encounter Date: 2/15/2019 Status: (Other)       February 15, 2019     Patient: Sandhya Caal   YOB: 1980   Date of Visit: 2/15/2019       To Whom It May Concern:    It is my medical opinion that Sandhya Caal may return to work on 2/18/19.Please excuse previous absence this week.     If you have any questions or concerns, please don't hesitate to call.    Sincerely,        Electronically signed by Deepti Hay PA-C

## 2021-06-18 NOTE — PATIENT INSTRUCTIONS - HE
Patient Instructions by Blanche Alvarez MD at 3/3/2021  2:11 PM     Author: Blanche Alvarez MD Service: -- Author Type: Physician    Filed: 3/3/2021  2:11 PM Encounter Date: 3/3/2021 Status: Signed    : Blanche Alvarez MD (Physician)         Dear Sandhya Caal    After reviewing your responses, I've been able to diagnose you with?a sinus infection caused by bacteria.?     Based on your responses and diagnosis, I have prescribed doxycycline to treat your symptoms. I have sent this to your pharmacy.?     It is also important to stay well hydrated, get lots of rest and take over-the-counter decongestants,?tylenol?or ibuprofen if you?are able to?take those medications per your primary care provider to help relieve discomfort.?     It is important that you take?all of?your prescribed medication even if your symptoms are improving after a few doses.? Taking?all of?your medicine helps prevent the symptoms from returning.?     If your symptoms worsen, you develop severe headache, vomiting, high fever (>102), or are not improving in 7 days, please contact your primary care provider for an appointment or visit any of our convenient Walk-in Care or Urgent Care Centers to be seen which can be found on our website?here.?     Thanks again for choosing?us?as your health care partner.    Blanche Alvarez M.D. 3/3/2021 2:10 PM      Sinusitis (Antibiotic Treatment)    The sinuses are air-filled spaces within the bones of the face. They connect to the inside of the nose. Sinusitis is an inflammation of the tissue that lines the sinuses. Sinusitis can occur during a cold. It can also happen due to allergies to pollens and other particles in the air. Sinusitis can cause symptoms of sinus congestion and a feeling of fullness. A sinus infection causes fever, headache, and facial pain. There is often green or yellow fluid draining from the nose or into the back of the throat (post-nasal drip). You have been given  antibiotics to treat this condition.  Home care    Take the full course of antibiotics as instructed. Do not stop taking them, even when you feel better.    Drink plenty of water, hot tea, and other liquids. This may help thin nasal mucus. It also may help your sinuses drain fluids.    Heat may help soothe painful areas of your face. Use a towel soaked in hot water. Or,  the shower and direct the warm spray onto your face. Using a vaporizer along with a menthol rub at night may also help soothe symptoms.     An expectorant with guaifenesin may help thin nasal mucus and help your sinuses drain fluids.    You can use an over-the-counter decongestant, unless a similar medicine was prescribed to you. Nasal sprays work the fastest. Use one that contains phenylephrine or oxymetazoline. First blow your nose gently. Then use the spray. Do not use these medicines more often than directed on the label. If you do, your symptoms may get worse. You may also take pills that contain pseudoephedrine. Dont use products that combine multiple medicines. This is because side effects may be increased. Read labels. You can also ask the pharmacist for help. (People with high blood pressure should not use decongestants. They can raise blood pressure.)    Over-the-counter antihistamines may help if allergies contributed to your sinusitis.      Do not use nasal rinses or irrigation during an acute sinus infection, unless your healthcare provider tells you to. Rinsing may spread the infection to other areas in your sinuses.    Use acetaminophen or ibuprofen to control pain, unless another pain medicine was prescribed to you. If you have chronic liver or kidney disease or ever had a stomach ulcer, talk with your healthcare provider before using these medicines. (Aspirin should never be taken by anyone under age 18 who is ill with a fever. It may cause severe liver damage.)    Don't smoke. This can make symptoms worse.  Follow-up  care  Follow up with your healthcare provider or our staff if you are better in 1 week.  When to seek medical advice  Call your healthcare provider if any of these occur:    Facial pain or headache that gets worse    Stiff neck    Unusual drowsiness or confusion    Swelling of your forehead or eyelids    Vision problems, such as blurred or double vision    Fever of 100.4 F (38 C) or higher, or as directed by your healthcare provider    Seizure    Breathing problems    Symptoms don't go away in 10 days  Prevention  Here are steps you can take to help prevent an infection:    Keep good hand washing habits.    Dont have close contact with people who have sore throats, colds, or other upper respiratory infections.    Dont smoke, and stay away from secondhand smoke.    Stay up to date with of your vaccines.  Date Last Reviewed: 11/1/2017 2000-2017 [employer]. [address] [place] [address], [place] [address]. All rights reserved. This information is not intended as a substitute for professional medical care. Always follow your healthcare professional's instructions.

## 2021-06-18 NOTE — PATIENT INSTRUCTIONS - HE
Patient Instructions by Estela Cantu CNP at 3/14/2020  8:50 AM     Author: Estela Cantu CNP Service: -- Author Type: Nurse Practitioner    Filed: 3/14/2020  9:28 AM Encounter Date: 3/14/2020 Status: Signed    : Estela Cantu CNP (Nurse Practitioner)         Patient Education     Acute Bacterial Rhinosinusitis (ABRS)    Acute bacterial rhinosinusitis (ABRS) is an infection of your nasal cavity and sinuses. Its caused by bacteria. Acute means that youve had symptoms for less than 4 weeks, but possibly up to 12 weeks.  Understanding your sinuses  The nasal cavity is the large air-filled space behind your nose. The sinuses are a group of spaces formed by the bones of your face. They connect with your nasal cavity. ABRS causes the tissue lining these spaces to become inflamed. Mucus may not drain normally. This leads to facial pain and other symptoms.  What causes ABRS?  ABRS most often follows an upper respiratory infection caused by a virus. Bacteria then infect the lining of your nasal cavity and sinuses. But you can also get ABRS if you have:    Nasal allergies    Long-term nasal swelling and congestion not caused by allergies    Blockage in the nose  Symptoms of ABRS  The symptoms of ABRS may be different for each person and include:    Nasal congestion or blockage    Pain or pressure in the face    Thick, colored drainage from the nose  Other symptoms may include:    Runny nose    Fluid draining from the nose down the throat (postnasal drip)    Headache    Cough    Pain    Fever  Diagnosing ABRS  ABRS may be diagnosed if youve had an upper respiratory infection like a cold and cough for 10 or more days without improvement or with worsening symptoms. Your healthcare provider will ask about your symptoms and your medical history. The provider will check your vital signs, including your temperature. Youll have a physical exam. The healthcare provider will check your ears, nose, and throat. You  likely wont need any tests. If ABRS comes back, you may have a culture or other tests.  Treatment for ABRS  Treatment may include:    Antibiotic medicine. This is for symptoms that last for at least 10 to 14 days.    Nasal corticosteroid medicine. Drops or spray used in the nose can lessen swelling and congestion.    Over-the-counter pain medicine. This is to lessen sinus pain and pressure.    Nasal decongestant medicine. Spray or drops may help to lessen congestion. Do not use them for more than a few days.    Salt wash (saline irrigation). This can help to loosen mucus.  Possible complications of ABRS  ABRS may come back or become long-term (chronic). In rare cases, ABRS may cause complications such as:     Inflamed tissue around the brain and spinal cord (meningitis)    Inflamed tissue around the eyes (orbital cellulitis)    Inflamed bones around the sinuses (osteitis)  These problems may need to be treated in a hospital with intravenous (IV) antibiotic medicine or surgery.  When to call the healthcare provider  Call your healthcare provider if you have any of the following:    Symptoms that dont get better, or get worse    Symptoms that dont get better after 3 to 5 days on antibiotics    Trouble seeing    Swelling around your eyes    Confusion or trouble staying awake   Date Last Reviewed: 5/1/2017 2000-2019 [employer]. [address] [place] [address], [place] [address]. All rights reserved. This information is not intended as a substitute for professional medical care. Always follow your healthcare professional's instructions.

## 2021-06-18 NOTE — PATIENT INSTRUCTIONS - HE
Patient Instructions by Amira Kevin MD at 2020  4:50 PM     Author: Amira Kevin MD Service: -- Author Type: Physician    Filed: 2020  4:50 PM Encounter Date: 2020 Status: Signed    : Amira Kevin MD (Physician)           Bacterial Vaginosis    You have a vaginal infection called bacterial vaginosis (BV). Both good and bad bacteria are present in a healthy vagina. BV occurs when these bacteria get out of balance. The number of bad bacteria increase. And the number of good bacteria decrease. Although BV is associated with sexual activity, it is not a sexually transmitted disease.  BV may or may not cause symptoms. If symptoms do occur, they can include:    Thin, gray, milky-white, or sometimes green discharge    Unpleasant odor or fishy smell    Itching, burning, or pain in or around the vagina  It is not known what causes BV, but certain factors can make the problem more likely. This can include:    Douching    Having sex with a new partner    Having sex with more than one partner  BV will sometimes go away on its own. But treatment is usually recommended. This is because untreated BV can increase the risk of more serious health problems such as:    Pelvic inflammatory disease (PID)     delivery (giving birth to a baby early if youre pregnant)    HIV and certain other sexually transmitted diseases (STDs)    Infection after surgery on the reproductive organs  Home care  General care    BV is most often treated with medicines called antibiotics. These may be given as pills or as a vaginal cream. If antibiotics are prescribed, be sure to use them exactly as directed. Also, be sure to complete all of the medicine, even if your symptoms go away.    Don't douche or having sex during treatment.    If you have sex with a female partner, ask your healthcare provider if she should also be treated.  Prevention    Don't douche.    Don't have sex. If you do have sex, then take steps to  lower your risk:  ? Use condoms when having sex.  ? Limit the number of sexual partners you have.  Follow-up care  Follow up with your healthcare provider, or as advised.  When to seek medical advice  Call your healthcare provider right away if:    You have a fever of 100.4 F (38 C) or higher, or as directed by your provider.    Your symptoms worsen, or they dont go away within a few days of starting treatment.    You have new pain in the lower belly or pelvic region.    You have side effects that bother you or a reaction to the pills or cream youre prescribed.    You or any partners you have sex with have new symptoms, such as a rash, joint pain, or sores.  Date Last Reviewed: 10/1/2017    1400-6325 [employer]. [address] [place] [address], [place] [address]. All rights reserved. This information is not intended as a substitute for professional medical care. Always follow your healthcare professional's instructions.

## 2021-06-19 NOTE — LETTER
Letter by Amira Kevin MD at      Author: Amira Kevin MD Service: -- Author Type: --    Filed:  Encounter Date: 5/10/2019 Status: (Other)         Asthma Action Plan    Patient Name: Sandhya Caal  Patient YOB: 1980    Doctor's Name: Amira Kevin    Emergency Contact:              Severity Classification: Intermittent    What triggers my asthma: smoke and exercise    GREEN ZONE: Doing Well   No cough, wheeze, chest tightness or shortness of breath during the day or night  Can do your usual activities    Take these medicines before exercise if your asthma is exercise-induced:  Medicine How Much to Take When to take it   albuterol  (also known as ProAir, Ventolin and Proventil) 2 puffs 15-30 minutes prior to exercise or sports     YELLOW ZONE: Asthma is Getting Worse   Cough, wheeze, chest tightness or shortness of breath or  Waking at night due to asthma, or  Can do some, but not all, usual activities.    Keep taking green zone medications and add quick-relief medicine:  Quick Relief Medicine How Much to Take When to take it   albuterol  (also known as ProAir, Ventolin and Proventil) 2 puffs every 4 hours as needed     If you do not feel better and your symptoms do not return to the green zone after one hour of the quick relief medication, then:    Take quick relief treatment again. Call your clinician within 1 hour.    Contact your clinician if you are using quick relief medication more than 2 times per week.    RED ZONE: Medical Alert!   Very short of breath, or  Quick relief medications have not helped, or  Cannot do usual activities, or  Symptoms are same or worse after 24 hours in the Yellow Zone.    Continue green zone medicines and add:  Quick Relief Medicine Dose When to take it   albuterol  (also known as ProAir, Ventolin and Proventil) 2 puffs may repeat every 20 minutes for up to 1 hour     IF ANY OF THESE ARE HAPPENING, SEEK EMERGENCY HELP AND CALL 911!   You are struggling to  breathe and are uncomfortable or  There is simply no clear improvement and you are worried about how to get through the next 30 minutes or  Trouble walking and talking due to shortness of breath, or  Lips or fingernails are blue    Provider signature:  Electronically Signed by Amira Kevin   Date: 05/10/19

## 2021-06-20 NOTE — PROGRESS NOTES
Assessment/plan  1. Vaginal bleeding  - Pregnancy (Beta-hCG, Qual), Urine  - HM2(CBC w/o Differential)  - Wet Prep, Vaginal  - Basic Metabolic Panel  2. IUD check up  Patient reassure IUD strings in place.   Discussed bleeding profile of Mirena IUD. Check hemoglobin. We discussed option of monitoring her bleeding and cycles for one year in total, removal of IUD, initiation of medication like progesterone, ablation to control her bleeding.   She prefers to continue to monitor for now.   Will monitor her bleeding, symptoms.   If she would like further evaluation and treatment for this, she will call or be seen.   No signs of acute abdomen. Ruled out infection or pregnancy though tubal ligation completed.     3. Diarrhea  - Culture, Stool; Future  - Giardia Detection, Stool; Future  - Basic Metabolic Panel  4. Rash  Unclear etiology.   Resolved for now. Her weight is stable.   Discussed possible etiology.   Due to patient concern, stool cultures and antigen for giardia collected. Will follow.   In the mean time, since her symptoms are improved, continue to monitor, continue well balanced diet, topical corticosteroid for rash. To call or be seen if any changes or worsening.       Subjective  [age] year old year old  here with concerns of ongoing vaginal bleeding. History of ectopic pregnancy. History of menorrhagia. This started about two weeks ago. She had an IUD placed in 2017 due to menorrhagia. EHR reviewed. This is an old concern. Tubal ligation was done for contraception.    Since her IUD placement, she notes regular menstrual cycles, though a little lighter flow than before. She was hoping for absence of her cycle like her sister. This menstrual period is longer than usual though not too heavy. She still feels a little tired and weak this week. She denies pelvic pain, abnormal discharge, itching. She notes chronic vaginal odor that is never positive for infection when checked, she uses  Boric acid for this as per Dr. Kevin's recommendations. She wanted to make sure her IUD was in place. She notes if the bleeding is to continue, she was considering ablation but has not seen Gyn regarding this yet.   She also is concerned about possible giardia infection. She was camping in the [facility] [place] a few weeks ago. She did go swimming. She had diarrhea following this trip, the stool appeared fatty and foul smelling. She is not sure about how long after the diarrhea started. She stared with a very itchy rash on the back of her thigh a week ago. It is red. She is using topical corticosteroid. It helps a little. Is trying to scratch the area. She read there is a rash associated with the infection. She feels like the diarrhea is resolved now, but still worried she has the infection. Would like to be tested. Denies any weight loss. No change in appetite. No blood in the stool. No fever or chills.     ROS: 12 systems reviewed, all negative except for what is mentioned in HPI.     Past Medical History:   Diagnosis Date     Polyuria 3/10/2015       Patient Active Problem List   Diagnosis     Mild intermittent asthma without complication     Major Depression, Recurrent     Migraine Headache     Fibromyalgia     Allergies     Generalized anxiety disorder     TMJ arthralgia     [place] neuroma, left     Menorrhagia     Dyspareunia in female     Iron deficiency anemia     Dyshidrotic eczema     PVC (premature ventricular contraction)     Past Surgical History:   Procedure Laterality Date     SINUS SURGERY Right 02/2006    postoperative nausea + vomiting. Postop pain severe.     TUBAL LIGATION  12/2016    with lysis of adhesions. Dr. Iman Condon [facility]     Family History   Problem Relation Age of Onset     Hypertension Mother      Depression Mother      Hyperlipidemia Mother      Arthritis Mother      Other Mother      Back problems - spinal stenosis. Multiple surgeries.     Hypertension Father      Hyperlipidemia Father       Arrhythmia Father      Atrial fibrillation, had ablation     Hypertension Sister      Anxiety disorder Sister      Depression Sister      Depression Brother      seasonal     No Medical Problems Son      Heart disease Maternal Grandfather      Kidney disease Paternal Grandfather      Anxiety disorder Sister      No Medical Problems Son      Endometriosis Maternal Aunt      Social History     Social History     Marital status: Single     Spouse name: Newton Caal     Number of children: 2     Years of education: Bachelors degree     Occupational History      [employer]     Social History Main Topics     Smoking status: Never Smoker     Smokeless tobacco: Never Used     Alcohol use Yes      Comment: 2/week     Drug use: No     Sexual activity: Yes     Partners: Male     Birth control/ protection: Surgical      Comment: Tubal ligation 2016     Other Topics Concern     Not on file     Social History Narrative    : Newton Caal    Kids: Luiz 2012, Leroy 2014.    Diet: Increasing protein, eats fruits and veggies.    Exercise: yoga, running, weights. Once a week.     Current Outpatient Prescriptions on File Prior to Visit   Medication Sig Dispense Refill     beclomethasone (QVAR) 80 mcg/actuation inhaler Inhale 2 puffs 2 (two) times a day. 3 Inhaler 4     cholecalciferol, vitamin D3, 2,000 unit capsule Take 1,000 Units by mouth daily.       cyanocobalamin, vitamin B-12, (VITAMIN B-12) 1,000 mcg tablet Take 1,000 mcg by mouth daily. 100 tablet 4     ferrous sulfate (IRON, FERROUS SULFATE,) 325 (65 FE) MG tablet Take 1 tablet (325 mg total) by mouth daily with breakfast. 100 tablet 4     gabapentin (NEURONTIN) 100 MG capsule Take 100 mg by mouth 3 (three) times a day as needed (burning pain in thighs). 30 capsule 2     Lactobacillus rhamnosus GG (CULTURELLE) 10-15 Billion cell capsule Take 1 capsule by mouth daily.       montelukast (SINGULAIR) 10 mg tablet Take 1 tablet (10 mg total) by mouth daily. 90  tablet 4     sertraline (ZOLOFT) 50 MG tablet Take 3 tablets (150 mg total) by mouth daily. 270 tablet 3     VENTOLIN HFA 90 mcg/actuation inhaler INHALE TWO PUFFS BY MOUTH EVERY FOUR HOURS AS NEEDED  18 g 1     levonorgestrel (MIRENA) 20 mcg/24 hr (5 years) IUD by Intrauterine route once for 1 dose. 1 each 0     No current facility-administered medications on file prior to visit.      Objective  Vitals:    09/27/18 0907   BP: 132/80   Pulse: 85   Resp: 16   SpO2: 95%       General Appearance:  Alert, cooperative, no distress, appears stated age   Head:  Normocephalic, without obvious abnormality, atraumatic   Eyes:  PERRL, conjunctiva/corneas clear, EOM's intact   Throat: Lips, mucosa, and tongue mary   Neck: Supple, symmetrical, trachea midline, no adenopathy;  thyroid: not enlarged, symmetric, no tenderness/mass/nodules; no carotid bruit or JVD   Lungs:   Clear to auscultation bilaterally, respirations unlabored   Heart:  Regular rate and rhythm, S1 and S2 normal, no murmur   Abdomen:   Soft, non-tender, bowel sounds active all four quadrants,  No guarding, no rebound   Genitalia: Normally developed genitalia with no external lesions or eruptions.  Vagina and cervix show no lesions, inflammation, discharge or tenderness.  No cystocele.  Uterus normal size and position.  No adnexal mass or tenderness. IUD strings in place.    Extremities: Extremities normal, atraumatic, no cyanosis or edema   Skin: Posterior right thigh, erythematous blanching rash   Lymph nodes: Cervical, supraclavicular nodes normal   Neurologic: Normal, CN 2-12 intact, normal strength and tone     Recent Results (from the past 240 hour(s))   Pregnancy (Beta-hCG, Qual), Urine   Result Value Ref Range    Pregnancy Test, Urine Negative Negative    Specific Gravity, UA 1.015 1.001 - 1.030   Wet Prep, Vaginal   Result Value Ref Range    Yeast Result No yeast seen No yeast seen    Trichomonas No Trichomonas seen No Trichomonas seen    Clue Cells,  Wet Prep No Clue cells seen No Clue cells seen   HM2(CBC w/o Differential)   Result Value Ref Range    WBC 7.5 4.0 - 11.0 thou/uL    RBC 4.71 3.80 - 5.40 mill/uL    Hemoglobin 13.5 12.0 - 16.0 g/dL    Hematocrit 41.2 35.0 - 47.0 %    MCV 87 80 - 100 fL    MCH 28.7 27.0 - 34.0 pg    MCHC 32.9 32.0 - 36.0 g/dL    RDW 12.2 11.0 - 14.5 %    Platelets 296 140 - 440 thou/uL    MPV 7.4 7.0 - 10.0 fL   Basic Metabolic Panel   Result Value Ref Range    Sodium 139 136 - 145 mmol/L    Potassium 4.6 3.5 - 5.0 mmol/L    Chloride 104 98 - 107 mmol/L    CO2 24 22 - 31 mmol/L    Anion Gap, Calculation 11 5 - 18 mmol/L    Glucose 92 70 - 125 mg/dL    Calcium 10.2 8.5 - 10.5 mg/dL    BUN 9 8 - 22 mg/dL    Creatinine 0.68 0.60 - 1.10 mg/dL    GFR MDRD Af Amer >60 >60 mL/min/1.73m2    GFR MDRD Non Af Amer >60 >60 mL/min/1.73m2   Culture, Stool   Result Value Ref Range    Culture       No Salmonella, Shigella, Yersinia, or Campylobacter.   Giardia Detection, Stool   Result Value Ref Range    Giardia Antigen No Giardia cysts detected No Giardia cysts detected   EnteroHaemorrhagic E. coli Work Up   Result Value Ref Range    Shiga Toxin 1 Negative Negative    Shiga Toxin 2 Negative Negative

## 2021-06-20 NOTE — LETTER
Letter by Rosalba Patel RN at      Author: Rosalba Patel RN Service: -- Author Type: --    Filed:  Encounter Date: 5/29/2020 Status: (Other)       5/29/2020        Sandhya Caal  [address] [facility] [address]    This letter provides a written record that you were tested for COVID-19 on 5/28/20.     Your result was negative.    This means that we didnt find the virus that causes COVID-19 in your sample. A test may show negative when you do actually have the virus. This can happen when the virus is in the early stages of infection, before you feel illness symptoms.    Even if you dont have symptoms, they may still appear. For safety, its very important to follow these rules.    Keep yourself away from others (self-isolation):      Stay home. Dont go to work, school or anywhere else.     Stay in your own room (and use your own bathroom), if you can.    Stay away from others in your home. No hugging, kissing or shaking hands. No visitors.    Clean high touch surfaces often (doorknobs, counters, handles, etc.). Use a household cleaning spray or wipes.    Cover your mouth and nose with a mask, tissue or washcloth to avoid spreading germs.    Wash your hands and face often with soap and water.    Stay in self-isolation until you meet ALL of the guidelines below:    1. You have had no fever for at least 72 hours (that is 3 full days of no fever without the use of medicine that reduces fevers), AND  2. other symptoms (such as cough, shortness of breath) have gotten better, AND  3. at least 10 days have passed since your symptoms first appeared.    Going back to work  Check with your employer for any guidelines to follow for going back to work.    Employers: This document serves as formal notice that your employee tested negative for COVID-19, as of the testing date shown above.    For questions regarding this letter or your Negative COVID-19 result, call [phone] between 8A to 6:30P (M-F) and 10A  to 6:30P (weekends).

## 2021-06-20 NOTE — LETTER
Letter by Amira Kevin MD at      Author: Amira Kevin MD Service: -- Author Type: --    Filed:  Encounter Date: 1/20/2020 Status: Signed       My Asthma Action Plan     Name: Sandhya Caal   YOB: 1980  Date: 1/20/2020   My doctor: Amira Kevin MD   My clinic: [facility]/OB        My Rescue Medicine:   Albuterol (Proair/Ventolin/Proventil HFA) 2-4 puffs EVERY 4 HOURS as needed. Use a spacer if recommended by your provider.   My Asthma Severity:   Intermittent/Exercise Induced  Know your asthma triggers: exercise or sports             GREEN ZONE   Good Control    I feel good    No cough or wheeze    Can work, sleep and play without asthma symptoms     Take your asthma control medicine every day.     1. If exercise triggers your asthma, take your rescue medication    15 minutes before exercise or sports, and    During exercise if you have asthma symptoms  2. Spacer to use with inhaler: If you have a spacer, make sure to use it with your inhaler             YELLOW ZONE Getting Worse  I have ANY of these:    I do not feel good    Cough or wheeze    Chest feels tight    Wake up at night 1. Keep taking your Green Zone medications  2. Start taking your rescue medicine:    every 20 minutes for up to 1 hour. Then every 4 hours for 24-48 hours.  3. If you stay in the Yellow Zone for more than 12-24 hours, contact your doctor.  4. If you do not return to the Green Zone in 12-24 hours or you get worse, start taking your oral steroid medicine if prescribed by your provider.           RED ZONE Medical Alert - Get Help  I have ANY of these:    I feel awful    Medicine is not helping    Breathing getting harder    Trouble walking or talking    Nose opens wide to breathe     1. Take your rescue medicine NOW  2. If your provider has prescribed an oral steroid medicine, start taking it NOW  3. Call your doctor NOW  4. If you are still in the Red Zone after 20 minutes and you have  not reached your doctor:    Take your rescue medicine again and    Call 911 or go to the emergency room right away    See your regular doctor within 2 weeks of an Emergency Room or Urgent Care visit for follow-up treatment.          Annual Reminders:  Meet with Asthma Educator,  Flu Shot in the Fall, consider Pneumonia Vaccination for patients with asthma (aged 19 and older).    Pharmacy:   [facility] [address] [place] [facility] - [place], [place] - [address]  [address] [facility] [address]  Phone: [phone] Fax: [phone]    [facility] - [place], [place] - [address]  [address] [facility] [address]  Phone: [phone] Fax: [phone]      Electronically signed by Amira Kevin MD   Date: 01/20/20                      Asthma Triggers  How To Control Things That Make Your Asthma Worse    Triggers are things that make your asthma worse.  Look at the list below to help you find your triggers and what you can do about them.  You can help prevent asthma flare-ups by staying away from your triggers.      Trigger                                                          What you can do   Cigarette Smoke  Tobacco smoke can make asthma worse. Do not allow smoking in your home, car or around you.  Be sure no one smokes at a thor day care or school.  If you smoke, ask your health care provider for ways to help you quit.  Ask family members to quit too.  Ask your health care provider for a referral to Quit Plan to help you quit smoking, or call 2-171-047-PLAN.     Colds, Flu, Bronchitis  These are common triggers of asthma. Wash your hands often.  Dont touch your eyes, nose or mouth.  Get a flu shot every year.     Dust Mites  These are tiny bugs that live in cloth or carpet. They are too small to see. Wash sheets and blankets in hot water every week.   Encase pillows and mattress in dust mite proof covers.  Avoid having carpet if you can. If you have carpet, vacuum  weekly.   Use a dust mask and HEPA vacuum.   Pollen and Outdoor Mold  Some people are allergic to trees, grass, or weed pollen, or molds. Try to keep your windows closed.  Limit time out doors when pollen count is high.   Ask you health care provider about taking medicine during allergy season.     Animal Dander  Some people are allergic to skin flakes, urine or saliva from pets with fur or feathers. Keep pets with fur or feathers out of your home.    If you cant keep the pet outdoors, then keep the pet out of your bedroom.  Keep the bedroom door closed.  Keep pets off cloth furniture and away from stuffed toys.     Mice, Rats, and Cockroaches  Some people are allergic to the waste from these pests.   Cover food and garbage.  Clean up spills and food crumbs.  Store grease in the refrigerator.   Keep food out of the bedroom.   Indoor Mold  This can be a trigger if your home has high moisture. Fix leaking faucets, pipes, or other sources of water.   Clean moldy surfaces.  Dehumidify basement if it is damp and smelly.   Smoke, Strong Odors, and Sprays  These can reduce air quality. Stay away from strong odors and sprays, such as perfume, powder, hair spray, paints, smoke incense, paint, cleaning products, candles and new carpet.   Exercise or Sports  Some people with asthma have this trigger. Be active!  Ask your doctor about taking medicine before sports or exercise to prevent symptoms.    Warm up for 5-10 minutes before and after sports or exercise.     Other Triggers of Asthma  Cold air:  Cover your nose and mouth with a scarf.  Sometimes laughing or crying can be a trigger.  Some medicines and food can trigger asthma.

## 2021-06-20 NOTE — LETTER
Letter by Rosalba Patel RN at      Author: Rosalba Patel RN Service: -- Author Type: --    Filed:  Encounter Date: 5/29/2020 Status: (Other)       5/29/2020        Sandhya Caal  [address] [facility] [address]    This letter provides a written record that you were tested for COVID-19 on 5/28/20.     Your result was negative.    This means that we didnt find the virus that causes COVID-19 in your sample. A test may show negative when you do actually have the virus. This can happen when the virus is in the early stages of infection, before you feel illness symptoms.    Even if you dont have symptoms, they may still appear. For safety, its very important to follow these rules.    Keep yourself away from others (self-isolation):      Stay home. Dont go to work, school or anywhere else.     Stay in your own room (and use your own bathroom), if you can.    Stay away from others in your home. No hugging, kissing or shaking hands. No visitors.    Clean high touch surfaces often (doorknobs, counters, handles, etc.). Use a household cleaning spray or wipes.    Cover your mouth and nose with a mask, tissue or washcloth to avoid spreading germs.    Wash your hands and face often with soap and water.    Stay in self-isolation until you meet ALL of the guidelines below:    1. You have had no fever for at least 72 hours (that is 3 full days of no fever without the use of medicine that reduces fevers), AND  2. other symptoms (such as cough, shortness of breath) have gotten better, AND  3. at least 10 days have passed since your symptoms first appeared.    Going back to work  Check with your employer for any guidelines to follow for going back to work.    Employers: This document serves as formal notice that your employee tested negative for COVID-19, as of the testing date shown above.    For questions regarding this letter or your Negative COVID-19 result, call [phone] between 8A to 6:30P (M-F) and 10A  to 6:30P (weekends).

## 2021-06-21 NOTE — PROGRESS NOTES
Assessment:     1. Acute maxillary sinusitis, recurrence not specified  amoxicillin (AMOXIL) 875 MG tablet   2. Acute bronchitis  predniSONE (DELTASONE) 20 MG tablet          Plan:     Differential diagnosis include but not limited to viral bronchitis, bacterial bronchitis, acute maxillary sinusitis.  Discussed with the patient on exam, patient with tenderness in the maxillary sinus.  Will treat patient for acute sinusitis in addition will treat for acute bronchitis with prednisone 20 mg daily times 5 days.  Advised patient to increase fluid intake.  Continue with over-the-counter cough medication as needed.  Monitor for worsening symptoms.  Follow-up with PCP if symptoms does not resolve after treatment.  Patient verbalized understanding the plan of care.    Subjective:       38 y.o. female presents for evaluation of cough and congestion times 2 weeks.  Patient reports that she has pain in her sinus region also in the chest.  Denies a fever, had chills last night.  Her cough is productive, greenish phlegm.  She also admits to sinus pain and tooth pain.  She has been using Mucinex twice a day, has increased fluid intake, and rest and she get minimal symptom relief.    The following portions of the patient's history were reviewed and updated as appropriate: allergies, current medications, past family history, past medical history, past social history, past surgical history and problem list.    Review of Systems  A 12 point comprehensive review of systems was negative except as noted.     Objective:        Vitals:    10/31/18 0918 10/31/18 1221   BP: 120/71    Pulse: 88    Resp: 18    Temp: 98  F (36.7  C)    TempSrc: Oral    SpO2: 97% 99%   Weight: 172 lb (78 kg)        General appearance: alert, appears stated age, cooperative and moderate distress  Head: Normocephalic, without obvious abnormality, atraumatic, sinuses tender to percussion  Eyes: conjunctivae/corneas clear. PERRL, EOM's intact. Fundi benign.  Ears:  abnormal TM right ear - bulging and air-fluid level and abnormal TM left ear - bulging and air-fluid level  Nose: severe congestion, sinus tenderness bilateral, moderate maxillary sinus tenderness bilateral, moderate frontal sinus tenderness bilateral  Throat: lips, mucosa, and tongue normal; teeth and gums normal  Lungs: clear to auscultation bilaterally  Heart: regular rate and rhythm, S1, S2 normal, no murmur, click, rub or gallop  Pulses: 2+ and symmetric  Skin: Skin color, texture, turgor normal. No rashes or lesions  Lymph nodes: Cervical, supraclavicular, and axillary nodes normal.  Neurologic: Grossly normal     This note has been dictated using voice recognition software. Any grammatical or context distortions are unintentional and inherent to the software

## 2021-06-22 NOTE — PROGRESS NOTES
E-visit for sinusitis symptoms for >1mo, not responding to usual care.  Rx amoxicillin (pt preference)  Return if symptoms not improving or recur.

## 2021-06-23 NOTE — TELEPHONE ENCOUNTER
RN cannot approve Refill Request    RN can NOT refill this medication med is not covered by policy/route to provider and historical medication requested      Leonora Cadena, [facility] Triage/Med Refill 1/18/2019    Requested Prescriptions   Pending Prescriptions Disp Refills     fluticasone (FLONASE) 50 mcg/actuation nasal spray [Pharmacy Med Name: FLUTICASONE PROPIONATE 50 SUSP] 48 g 4     Sig: APPLY TWO SPRAYS IN EACH NOSTRIL EVERY DAY    Nasal Steroid Refill Protocol Passed - 1/17/2019  2:54 PM       Passed - Patient has had office visit/physical in last 2 years    Last office visit with prescriber/PCP: 2/2/2018 OR same dept: 9/27/2018 Cruzito Moura MD OR same specialty: 9/27/2018 Cruzito Moura MD Last physical: 10/27/2017 Last MTM visit: Visit date not found    Next appt within 3 mo: Visit date not found  Next physical within 3 mo: Visit date not found  Prescriber OR PCP: Amira Kevin MD  Last diagnosis associated with med order: 1. Chronic nonseasonal allergic rhinitis due to pollen  - fluticasone (FLONASE) 50 mcg/actuation nasal spray [Pharmacy Med Name: FLUTICASONE PROPIONATE 50 SUSP]; APPLY TWO SPRAYS IN EACH NOSTRIL EVERY DAY  Dispense: 48 g; Refill: 4  - montelukast (SINGULAIR) 10 mg tablet [Pharmacy Med Name: MONTELUKAST SODIUM 10MG TABS]; TAKE ONE TABLET BY MOUTH EVERY DAY  Dispense: 90 tablet; Refill: 4     If protocol passes may refill for 12 months if within 3 months of last provider visit (or a total of 15 months).              montelukast (SINGULAIR) 10 mg tablet [Pharmacy Med Name: MONTELUKAST SODIUM 10MG TABS] 90 tablet 4     Sig: TAKE ONE TABLET BY MOUTH EVERY DAY    There is no refill protocol information for this order

## 2021-06-24 NOTE — PROGRESS NOTES
Patient-initiated e-visit for sinus pain.  Antibiotics seem appropriate based on history.  Rx sent for augmentin.  Needs to be seen if symptoms worsen or fail to improve.

## 2021-06-24 NOTE — PROGRESS NOTES
Chief Complaint   Patient presents with     BODYACHES     5DAYS     Chills     Fatigue     Sore Throat     3 days     Diarrhea     3 days ago     Abdominal Pain       HPI:  Sandhya Caal is a 38 y.o. female who presents today complaining of throat, body aches, and stomachaches for the past 3 days.  Patient is also been experiencing diarrhea for the past 3 days. No known sick contacts.  Patient stools have been watery and approximately once per day.  She has been experiencing low abdominal cramping.  She denies any blood in her stools.  She has not had any fevers.  No major nasal congestion or coughing present.  She has not had any nausea or vomiting.  She has been taking ibuprofen for her sore throat and body ache relief, and has removed dairy from her diet.  She has not had any recent antibiotics or travel outside of the [place].    History obtained from the patient.    Problem List:  2016-12: Menorrhagia  2016-12: Dyspareunia in female  2016-12: Iron deficiency anemia  2016-12: Dyshidrotic eczema  2016-12: PVC (premature ventricular contraction)  2015-10: TMJ arthralgia  2015-10: [place] neuroma, left  2015-01: Generalized anxiety disorder  Mild intermittent asthma without complication  Major Depression, Recurrent  Migraine Headache  Fibromyalgia  Allergies      Past Medical History:   Diagnosis Date     Polyuria 3/10/2015       Social History     Tobacco Use     Smoking status: Never Smoker     Smokeless tobacco: Never Used   Substance Use Topics     Alcohol use: Yes     Comment: 2/week       Review of Systems   Constitutional: Negative for fever.   HENT: Positive for ear pain (Left) and sore throat. Negative for congestion and rhinorrhea.    Respiratory: Negative for cough.    Gastrointestinal: Positive for abdominal pain and diarrhea. Negative for blood in stool, nausea and vomiting.       Vitals:    02/15/19 0911   BP: 110/68   Pulse: 100   Resp: 16   Temp: 98.2  F (36.8  C)   SpO2: 98%   Weight: 170  lb (77.1 kg)       Physical Exam   Constitutional: She appears well-developed and well-nourished. No distress.   HENT:   Head: Normocephalic and atraumatic.   Right Ear: Tympanic membrane, external ear and ear canal normal.   Left Ear: Tympanic membrane, external ear and ear canal normal.   Mouth/Throat: Oropharynx is clear and moist. No oropharyngeal exudate, posterior oropharyngeal edema or posterior oropharyngeal erythema.   Eyes: Conjunctivae are normal. Right eye exhibits no discharge. Left eye exhibits no discharge.   Cardiovascular: Normal rate, regular rhythm and normal heart sounds.   Pulmonary/Chest: Effort normal and breath sounds normal. No respiratory distress.   Abdominal: Soft. Normal appearance. There is no hepatosplenomegaly. There is tenderness in the epigastric area, periumbilical area and suprapubic area. There is no guarding, no tenderness at McBurney's point and negative Bay's sign.   Lymphadenopathy:     She has cervical adenopathy.   Skin: She is not diaphoretic.   Psychiatric: She has a normal mood and affect. Her behavior is normal. Judgment and thought content normal.       Labs:  Recent Results (from the past 72 hour(s))   Rapid Strep A Screen-Throat   Result Value Ref Range    Rapid Strep A Antigen No Group A Strep detected, presumptive negative No Group A Strep detected, presumptive negative     Clinical Decision Making:  RST was negative, confirmatory strep test pending.  Patient's physical exam is relatively benign showing no obvious bacterial infections of the ears or throat.  Abdominal exam was showing midline tenderness from epigastric region to suprapubic.  Distribution with presence of diarrhea is most indicative of a viral gastroenteritis.  She does not have any risk factors for parasitic or bacterial enteritis.  No current signs of peritonsillar abscess present.  Patient was reassured by strep rule out, she is going on a trip this weekend.  At the end of the encounter, I  discussed results, diagnosis, medications. Discussed red flags for immediate return to clinic/ER, as well as indications for follow up if no improvement. Patient understood and agreed to plan. Patient was stable for discharge.    1. Throat pain  Rapid Strep A Screen-Throat    Group A Strep, RNA Direct Detection, Throat   2. Viral pharyngitis     3. Viral gastroenteritis           Patient Instructions   1) Increase fluids and rest  2) Continue taking Tylenol/Ibuprofen for pain relief as needed.  3) Salt water gargles and lozenges can be helpful for throat relief  4) You will only be notified of the confirmatory strep results if they are positive.   5) Jelm diet, BRAT diet, avoidance of spicy, fatty, or dairy.   6) I don't currently recommend any antidiarrheals.   7) Follow up if no improvement in 4 days.

## 2021-06-24 NOTE — PATIENT INSTRUCTIONS - HE
1) Increase fluids and rest  2) Continue taking Tylenol/Ibuprofen for pain relief as needed.  3) Salt water gargles and lozenges can be helpful for throat relief  4) You will only be notified of the confirmatory strep results if they are positive.   5) [place] diet, BRAT diet, avoidance of spicy, fatty, or dairy.   6) I don't currently recommend any antidiarrheals.   7) Follow up if no improvement in 4 days.

## 2021-06-25 ENCOUNTER — COMMUNICATION - HEALTHEAST (OUTPATIENT)
Dept: FAMILY MEDICINE | Facility: CLINIC | Age: 41
End: 2021-06-25

## 2021-06-25 NOTE — TELEPHONE ENCOUNTER
Refill Approved    Rx renewed per Medication Renewal Policy. Medication was last renewed on 10/27/18.    Leonora Cadena, [facility] Triage/Med Refill 3/21/2019     Requested Prescriptions   Pending Prescriptions Disp Refills     sertraline (ZOLOFT) 50 MG tablet [Pharmacy Med Name: SERTRALINE HCL 50 MG TABLET] 270 tablet 2     Sig: TAKE 3 TABLETS (150 MG TOTAL) BY MOUTH DAILY.    [facility] Refill Protocol  Passed - 3/19/2019  9:09 AM       Passed - PCP or prescribing provider visit in last year    Last office visit with prescriber/PCP: 2/2/2018 Amira Kevin MD OR same dept: 9/27/2018 Cruzito Moura MD OR same specialty: 9/27/2018 Cruzito Moura MD  Last physical: 10/27/2017 Last MTM visit: Visit date not found   Next visit within 3 mo: Visit date not found  Next physical within 3 mo: Visit date not found  Prescriber OR PCP: Amira Kevin MD  Last diagnosis associated with med order: 1. Generalized anxiety disorder  - sertraline (ZOLOFT) 50 MG tablet [Pharmacy Med Name: SERTRALINE HCL 50 MG TABLET]; Take 3 tablets (150 mg total) by mouth daily.  Dispense: 270 tablet; Refill: 2    2. Mild episode of recurrent major depressive disorder (H)  - sertraline (ZOLOFT) 50 MG tablet [Pharmacy Med Name: SERTRALINE HCL 50 MG TABLET]; Take 3 tablets (150 mg total) by mouth daily.  Dispense: 270 tablet; Refill: 2    If protocol passes may refill for 12 months if within 3 months of last provider visit (or a total of 15 months).

## 2021-06-26 NOTE — PROGRESS NOTES
Progress Notes by Sonny Valverde DO at 2/19/2018 12:10 PM     Author: Sonny Valverde DO Service: -- Author Type: Physician    Filed: 2/19/2018  6:17 PM Encounter Date: 2/19/2018 Status: Signed    : Sonny Valverde DO (Physician)       Chief Complaint   Patient presents with   ? Fever     flu symtoms    ? Cough     started friday night 02/19/2018   ? Sore Throat        History of Present Illness: Nursing notes reviewed. Patient has had a headache. She has some shortness of breath at exam. Patient has asthma. She had some nausea with taking Tamiflu in the past.  She would like to try taking this again if she could be prescribed something to help control her nausea.    Review of systems: See history of present illness, otherwise negative.     Current Outpatient Prescriptions   Medication Sig Dispense Refill   ? beclomethasone (QVAR) 80 mcg/actuation inhaler Inhale 2 puffs 2 (two) times a day. 3 Inhaler 4   ? cetirizine (ZYRTEC) 10 MG tablet Take 10 mg by mouth.     ? cholecalciferol, vitamin D3, 2,000 unit capsule Take 1,000 Units by mouth daily.     ? cyanocobalamin, vitamin B-12, (VITAMIN B-12) 1,000 mcg tablet Take 1,000 mcg by mouth daily. 100 tablet 4   ? ferrous sulfate (IRON, FERROUS SULFATE,) 325 (65 FE) MG tablet Take 1 tablet (325 mg total) by mouth daily with breakfast. 100 tablet 4   ? fluticasone (FLONASE) 50 mcg/actuation nasal spray 2 sprays into each nostril daily. 48 g 4   ? gabapentin (NEURONTIN) 100 MG capsule Take 100 mg by mouth 3 (three) times a day as needed (burning pain in thighs). 30 capsule 2   ? Lactobacillus rhamnosus GG (CULTURELLE) 10-15 Billion cell capsule Take 1 capsule by mouth daily.     ? montelukast (SINGULAIR) 10 mg tablet Take 1 tablet (10 mg total) by mouth daily. 90 tablet 4   ? sertraline (ZOLOFT) 50 MG tablet Take 3 tablets (150 mg total) by mouth daily. 270 tablet 3   ? VENTOLIN HFA 90 mcg/actuation inhaler INHALE TWO PUFFS BY MOUTH EVERY FOUR HOURS AS NEEDED  18 g 1    ? levonorgestrel (MIRENA) 20 mcg/24 hr (5 years) IUD by Intrauterine route once for 1 dose. 1 each 0   ? metroNIDAZOLE (METROGEL VAGINAL) 0.75 % vaginal gel 1 applicator intravaginally daily at bedtime for 5 days 25 g 1   ? ondansetron (ZOFRAN-ODT) 8 MG disintegrating tablet Take 1 tablet (8 mg total) by mouth every 8 (eight) hours as needed for nausea. 6 tablet 0   ? oseltamivir (TAMIFLU) 75 MG capsule Take 1 capsule (75 mg total) by mouth 2 (two) times a day for 5 days. 10 capsule 0   ? predniSONE (DELTASONE) 20 MG tablet Take 2 tabs daily for 3 days, then 1 tabs daily for 3. 12 tablet 0     No current facility-administered medications for this visit.        Past Medical History:   Diagnosis Date   ? Polyuria 3/10/2015      Past Surgical History:   Procedure Laterality Date   ? SINUS SURGERY Right 02/2006    postoperative nausea + vomiting. Postop pain severe.   ? TUBAL LIGATION  12/2016    with lysis of adhesions. Dr. Iman Condon [facility]      Social History     Social History   ? Marital status: Single     Spouse name: Newton Caal   ? Number of children: 2   ? Years of education: Bachelors degree     Occupational History   ?  [employer]     Social History Main Topics   ? Smoking status: Never Smoker   ? Smokeless tobacco: Never Used   ? Alcohol use Yes      Comment: 2/week   ? Drug use: No   ? Sexual activity: Yes     Partners: Male     Birth control/ protection: Surgical      Comment: Tubal ligation 2016     Other Topics Concern   ? None     Social History Narrative    : Newton Caal    Kids: Luiz 2012, Leroy 2014.    Diet: Increasing protein, eats fruits and veggies.    Exercise: yoga, running, weights. Once a week.       History   Smoking Status   ? Never Smoker   Smokeless Tobacco   ? Never Used      Exam:   Blood pressure 118/82, pulse (!) 106, temperature 98.6  F (37  C), temperature source Oral, resp. rate 18, weight 164 lb (74.4 kg), last menstrual period 02/19/2018, SpO2 98 %, not  currently breastfeeding.    EXAM:   General: Vital signs reviewed notable for some tachycardia. Patient is in some distress due to some shortness of breath reported by patient, though breathing is non labored appearing. Patient is alert and oriented x 3.   ENT: Ear exam shows clear TMs with no injection, nasal turbinates are injected and edematous with increased clear rhinorrhea, no pharyngeal injection with increased post nasal drainage noted.  Neck: supple with no adenopathy.  Heart: Normal rate and rhythm without murmur  Lungs: Clear to auscultation with fair air flow bilaterally on initial exam.  After the nebulizer treatment, patient had improved airflow bilaterally with lungs being clear to auscultation bilaterally.  Skin: warm and diaphoretic    Recent Results (from the past 24 hour(s))   Influenza A/B Rapid Test   Result Value Ref Range    Influenza  A, Rapid Antigen No Influenza A antigen detected No Influenza A antigen detected    Influenza B, Rapid Antigen Influenza B antigen detected (!) No Influenza B antigen detected   Rapid Strep A Screen-Throat   Result Value Ref Range    Rapid Strep A Antigen No Group A Strep detected, presumptive negative No Group A Strep detected, presumptive negative    Results from exam reviewed with patient.    Assessment/Plan   1. Fever  Influenza A/B Rapid Test    Rapid Strep A Screen-Throat    Group A Strep, RNA Direct Detection, Throat   2. Throat pain  Rapid Strep A Screen-Throat    Group A Strep, RNA Direct Detection, Throat   3. SOB (shortness of breath)  albuterol nebulizer solution 3 mL (PROVENTIL)   4. Influenza B  oseltamivir (TAMIFLU) 75 MG capsule   5. Asthma exacerbation  predniSONE (DELTASONE) 20 MG tablet   6. History of nausea  ondansetron (ZOFRAN-ODT) 8 MG disintegrating tablet       Patient Instructions     Also see info below. Be seen again in 3 days if symptoms are not better, sooner if feeling any worse.    Influenza     Viruses that cause influenza spread  through the air in droplets when someone who has the flu coughs, sneezes, laughs, or talks.   Influenza (the flu) is an infection that affects your respiratory tract. This tract is made up of your mouth, nose, and lungs, and the passages between them. Unlike a cold, the flu can make you very ill. And it can lead to pneumonia, a serious lung infection. The flu can have serious complications and even be fatal for some people. These include older adults, young children, and people with certain chronic conditions.  Who is at risk for the flu?  Anyone can get the flu. But you are more likely to become infected if you:    Have a weakened immune system    Work in a healthcare setting where you may be exposed to flu germs    Live or work with someone who has the flu    Havent had an annual flu shot  How does the flu spread?  The flu is caused by viruses. The viruses spread through the air in droplets when someone who has the flu coughs, sneezes, laughs, or talks. You can become infected when you inhale these viruses directly. You can also become infected when you touch a surface on which the droplets have landed and then transfer the germs to your eyes, nose, or mouth. Touching used tissues, or sharing utensils, drinking glasses, or a toothbrush with an infected person can expose you to flu viruses, too.  What are the symptoms of the flu?  Flu symptoms tend to come on quickly and may last a few days to a few weeks. They include:    Fever usually higher than 100.4 F  (38 C) and chills    Sore throat and headache    Dry cough    Runny nose    Tiredness and weakness    Muscle aches  Things that make the flu worse  For some people, the flu can be very serious. The risk for complications is greater for:    Children younger than age 5    Adults ages 65 and older    People with a chronic illness such as diabetes or heart, kidney, or lung disease    People who live in a nursing home or long-term care facility   How is the flu  treated?  The flu usually gets better after 7 days or so. In some cases, your healthcare provider may prescribe an antiviral medicine. This may help you get well sooner. For the medicine to help, you need to take it as soon as possible (ideally within 48 hours) after your symptoms start. If you develop pneumonia or other serious illness, you may need to stay in the hospital.  Easing flu symptoms    Drink lots of fluids such as water, juice, and warm soup. A good rule is to drink enough so that you urinate your normal amount.    Get plenty of rest.    Ask your healthcare provider what to take for fever and pain.    Call your provider if your fever is 100.4 F (38 C) or higher, or you become dizzy, lightheaded, or short of breath.  Taking steps to protect others    Wash your hands often, especially after coughing or sneezing. Or clean your hands with an alcohol-based hand  containing at least 60% alcohol.    Cough or sneeze into a tissue. Then throw the tissue away and wash your hands. If you dont have a tissue, cough and sneeze into the crook of your elbow.    Stay home until at least 24 hours after you no longer have a fever or chills. Be sure the fever isnt being hidden by fever-reducing medicine.    Dont share food, utensils, drinking glasses, or a toothbrush with others.    Ask your healthcare provider if others in your household should get antiviral medicine to help them avoid infection.  How can the flu be prevented?    One of the best ways to avoid the flu is to get a flu vaccine each year. Viruses that cause the flu change from year to year. For that reason, doctors recommend getting the flu vaccine each year, as soon as it's available in your area. The vaccine may be given as a shot or as a nasal spray. Your healthcare provider can tell you which vaccine is right for you. The nasal spray is not recommended for the 7789-1244 flu season. The CDC says this is because the nasal spray did not seem to  protect against the flu over the last several flu seasons. In the past, it was meant for people ages 2 to 49.    Wash your hands often. Frequent handwashing is a proven way to help prevent infection.    Carry an alcohol-based hand gel containing at least 60% alcohol. Use it when you can't use soap and water. Then wash your hands as soon as you can.    Avoid touching your eyes, nose, and mouth.    At home and work, clean phones, computer keyboards, and toys often with disinfectant wipes.    If possible, avoid close contact with others who have the flu or symptoms of the flu.  Handwashing tips  Handwashing is one of the best ways to prevent many common infections. If you are caring for or visiting someone with the flu, wash your hands each time you enter and leave the room. Follow these steps:    Use warm water and plenty of soap. Rub your hands together well.    Clean the whole hand, under your nails, between your fingers, and up the wrists.    Wash for at least 15 seconds.    Rinse, letting the water run down your fingers, not up your wrists.    Dry your hands well. Use a paper towel to turn off the faucet and open the door.  Using alcohol-based hand   Alcohol-based hand  are also a good choice. Use them when you can't use soap and water. Follow these steps:    Squeeze about a tablespoon of gel into the palm of one hand.    Rub your hands together briskly, cleaning the backs of your hands, the palms, between your fingers, and up the wrists.    Rub until the gel is gone and your hands are completely dry.  Preventing influenza in healthcare settings  The flu is a special concern for people in hospitals and long-term care facilities. To help prevent the spread of flu, many hospitals and nursing homes take these steps:    Healthcare providers wash their hands or use an alcohol-based hand  before and after treating each patient.    People with the flu have private rooms and bathrooms or share a  room with someone with the same infection.    People at high-risk for the flu but don't have it are encouraged to get the flu and pneumonia vaccines.    All healthcare workers are encouraged or required to get flu shots.   Date Last Reviewed: 8/27/2014 2000-2016 [employer] [facility] [employer]. [address] [place] [id number]. All rights reserved. This information is not intended as a substitute for professional medical care. Always follow your healthcare professional's instructions.           Sonny Valverde, DO

## 2021-06-28 NOTE — PROGRESS NOTES
Progress Notes by Cabrera Mcdowell PA-C at 10/9/2019  9:00 AM     Author: Cabrera Mcdowell PA-C Service: -- Author Type: Physician Assistant    Filed: 10/9/2019  9:41 AM Encounter Date: 10/9/2019 Status: Signed    : Cabrera Mcdowell PA-C (Physician Assistant)       Subjective:      Patient ID: Sandhya Caal is a 39 y.o. female.    Chief Complaint:    HPI     Sandhya Caal is a 39 y.o. female who presents today complaining of two day acute onset of sore throat and odynophagia.  Patient denies fever, chills, night sweats, fatigue, vomiting, diarrhea, skin rash, abdominal pain or urinary symptoms.      No known sick contacts for strep throat.    Has not tried treatment for this over-the-counter.      Past Medical History:   Diagnosis Date   ? Major Depression, Recurrent        Past Surgical History:   Procedure Laterality Date   ? SINUS SURGERY Right 02/2006    postoperative nausea + vomiting. Postop pain severe.   ? TUBAL LIGATION  12/2016    with lysis of adhesions. Dr. Iman Condon [facility]       Family History   Problem Relation Age of Onset   ? Hypertension Mother    ? Depression Mother    ? Hyperlipidemia Mother    ? Other Mother         Back problems - spinal stenosis. Multiple surgeries. Bronchiectasis. Lichen sclerosis   ? Osteoarthritis Mother    ? Kidney disease Mother         Long term Vioxx use   ? Hypertension Father    ? Hyperlipidemia Father    ? Arrhythmia Father         Atrial fibrillation, had ablation   ? Memory loss Father    ? Osteoarthritis Father    ? Hypertension Sister    ? Anxiety disorder Sister    ? Depression Sister    ? Depression Brother         seasonal   ? Asthma Son    ? Rheum arthritis Maternal Grandmother    ? Heart disease Maternal Grandfather    ? Kidney disease Paternal Grandfather    ? Anxiety disorder Sister    ? No Medical Problems Son    ? Endometriosis Maternal Aunt        Social History     Tobacco Use   ? Smoking status: Never Smoker   ? Smokeless tobacco: Never Used    Substance Use Topics   ? Alcohol use: Yes     Comment: 2/week   ? Drug use: No       Review of Systems  As above in HPI, otherwise balance of Review of Systems are negative.    Objective:     /78 (Patient Site: Right Arm, Patient Position: Sitting, Cuff Size: Adult Regular)   Pulse 100   Temp 97.8  F (36.6  C)   Resp 18   Wt 167 lb 8 oz (76 kg)   SpO2 98%   BMI 27.24 kg/m      Physical Exam  General: Patient is resting comfortably no acute distress is afebrile  HEENT: Head is normocephalic atraumatic   eyes are PERRL EOMI sclera anicteric   TMs are clear bilaterally  Throat is with mild pharyngeal wall erythema and a small amount of white exudate on the left tonsil.  cervical lymphadenopathy and slight tenderness to palpation on the left submandibular lymph node present; otherwise, no other lymphadenopathy noted.  LUNGS: Clear to auscultation bilaterally  HEART: Regular rate and rhythm  Skin: Without rash non-diaphoretic    Lab:  Recent Results (from the past 24 hour(s))   Rapid Strep A Screen-Throat swab   Result Value Ref [facility]    Rapid Strep A Antigen No Group A Strep detected, presumptive negative No Group A Strep detected, presumptive negative       Assessment:     Procedures    The encounter diagnosis was Throat pain.    Plan:     1. Throat pain  Rapid Strep A Screen-Throat swab    Group A Strep, RNA Direct Detection, Throat       A conversation with the patient stating that the strep test was negative.  Will await culture results.  She did have some tenderness and redness of the left tonsil.  Will await the culture results for treatment.  Otherwise follow-up with primary care provider if not getting good resolution with symptomatic care or if new symptoms or concerns arise.    Patient Instructions     Suggested increased rest increased fluids and bedside humidification  Over-the-counter Tylenol for comfort.  Follow packaging directions  Over-the-counter throat lozenges with benzocaine such as  Cepacol may be used if indicated and is not a choking hazard based on age.  Follow packaging directions.  Do not overuse the benzocaine as it will dry the throat and make it uncomfortable.  Return to primary care provider for reevaluation treatment if any complication or new symptoms should present.      Self-Care for Sore Throats  Sore throats happen for many reasons, such as colds, allergies, and infections caused by viruses or bacteria. In any case, your throat becomes red and sore. Your goal for self-care is to reduce your discomfort while giving your throat a chance to heal.    Moisten and soothe your throat  Tips include the following:    Try a sip of water first thing after waking up.    Keep your throat moist by drinking 6 or more glasses of clear liquids every day.    Run a cool-air humidifier in your room overnight.    Avoid cigarette smoke.     Suck on throat lozenges, cough drops, hard candy, ice chips, or frozen fruit-juice bars. Use the sugar-free versions if your diet or medical condition requires them.  Gargle to ease irritation  Gargling every hour or 2 can ease irritation. Try gargling with 1 of these solutions:    1/4 teaspoon of salt in 1/2 cup of warm water    An over-the-counter anesthetic gargle  Use medicine for more relief  Over-the-counter medicine can reduce sore throat symptoms. Ask your pharmacist if you have questions about which medicine to use:    Ease pain with anesthetic sprays. Aspirin or an aspirin substitute also helps. Remember, never give aspirin to anyone 18 or younger, or if you are already taking blood thinners.     For sore throats caused by allergies, try antihistamines to block the allergic reaction.    Remember: unless a sore throat is caused by a bacterial infection, antibiotics wont help you.  Prevent future sore throats  Prevention tips include the following:    Stop smoking or reduce contact with secondhand smoke. Smoke irritates the tender throat lining.    Limit  contact with pets and with allergy-causing substances, such as pollen and mold.    When youre around someone with a sore throat or cold, wash your hands often to keep viruses or bacteria from spreading.    Dont strain your vocal cords.  Call your healthcare provider  Contact your healthcare provider if you have:    A temperature over 101 F (38.3 C)    White spots on the throat    Great difficulty swallowing    Trouble breathing    A skin rash    Recent exposure to someone else with strep bacteria    Severe hoarseness and swollen glands in the neck or jaw   Date Last Reviewed: 8/1/2016 2000-2016 [employer]. [address] [place] [address]. All rights reserved. This information is not intended as a substitute for professional medical care. Always follow your healthcare professional's instructions.

## 2021-06-28 NOTE — PROGRESS NOTES
Progress Notes by Estela Cantu CNP at 3/14/2020  8:50 AM     Author: Estela Cantu CNP Service: -- Author Type: Nurse Practitioner    Filed: 3/14/2020  9:33 AM Encounter Date: 3/14/2020 Status: Signed    : Estela Cantu CNP (Nurse Practitioner)       Chief Complaint   Patient presents with   ? Sinus Problem     Upper sinus congested with discharge that starts going down to the chest, Started in January and slowly progressing        ASSESSMENT & PLAN:   Diagnoses and all orders for this visit:    Acute bacterial sinusitis  -     doxycycline (MONODOX) 100 MG capsule; Take 1 capsule (100 mg total) by mouth 2 (two) times a day for 7 days.  Dispense: 14 capsule; Refill: 0        MDM:  Sx and length of time c/w bacterial sinusitis.  She has abd side effects from augmentin, prefers alternative.      Recheck in 4 days if not starting to feel better.      Supportive care discussed.  See discharge instructions below for specific recommendations given.    At the end of the encounter, I discussed results, diagnosis, medications. Discussed red flags for immediate return to clinic/ER, as well as indications for follow up if no improvement. Patient and/or caregiver understood and agreed to plan. Patient was stable for discharge.    SUBJECTIVE    HPI:  HPI  Sandhya Caal presents to the walk-in clinic with   Chief Complaint   Patient presents with   ? Sinus Problem     Upper sinus congested with discharge that starts going down to the chest, Started in January and slowly progressing      Sinus congestion, drainage posterior.      Using Neti Pot.      Drainage is green at times, will be clear with frequent neti pot     Associated with: Frontal head pain, dull ache in chest, fatigue.  Is asthmatic.      Taking usual asthma meds including qvar, mucinex.      Had sinus surg 2006.  Was told by surgeon two sinus infections per year was normal after this.  Has had 3 this year.      Known exposures: possible flu in  "January, wasn't tested     See ROS for additional symptoms and/or pertinent negatives.       History obtained from the patient.    Past Medical History:   Diagnosis Date   ? Major Depression, Recurrent        Active Ambulatory (Non-Hospital) Problems    Diagnosis   ? Menorrhagia   ? Iron deficiency anemia   ? Dyshidrotic eczema   ? PVC (premature ventricular contraction)   ? TMJ arthralgia   ? [place] neuroma, left   ? Generalized anxiety disorder   ? Mild intermittent asthma without complication   ? Migraine Headache   ? Fibromyalgia   ? Allergies       Family History   Problem Relation Age of Onset   ? Hypertension Mother    ? Depression Mother    ? Hyperlipidemia Mother    ? Other Mother         Back problems - spinal stenosis. Multiple surgeries. Bronchiectasis. Lichen sclerosis   ? Osteoarthritis Mother    ? Kidney disease Mother         Long term Vioxx use   ? Hypertension Father    ? Hyperlipidemia Father    ? Arrhythmia Father         Atrial fibrillation, had ablation   ? Memory loss Father    ? Osteoarthritis Father    ? Hypertension Sister    ? Anxiety disorder Sister    ? Depression Sister    ? Depression Brother         seasonal   ? Asthma Son    ? Rheum arthritis Maternal Grandmother    ? Heart disease Maternal Grandfather    ? Kidney disease Paternal Grandfather    ? Anxiety disorder Sister    ? No Medical Problems Son    ? Endometriosis Maternal Aunt        Social History     Tobacco Use   ? Smoking status: Never Smoker   ? Smokeless tobacco: Never Used   Substance Use Topics   ? Alcohol use: Yes     Comment: 2/week       Review of Systems   Constitutional: Positive for fatigue.   HENT: Positive for ear pain (left uncomfortable ), postnasal drip and sore throat (mild \"from drainage\" ).    Respiratory: Positive for cough (\"from drainage\" ).    Gastrointestinal: Negative for vomiting.       OBJECTIVE    Vitals:    03/14/20 0859   BP: 113/77   Patient Site: Right Arm   Patient Position: Sitting   Cuff " Size: Adult Regular   Pulse: 95   Resp: 20   Temp: 98.4  F (36.9  C)   TempSrc: Oral   SpO2: 98%   Weight: 165 lb 4 oz (75 kg)       Physical Exam  Constitutional:       Appearance: She is well-developed.   HENT:      Right Ear: Tympanic membrane and external ear normal.      Left Ear: Tympanic membrane and external ear normal.      Nose: No congestion or rhinorrhea.      Right Sinus: Maxillary sinus tenderness and frontal sinus tenderness present.      Left Sinus: Maxillary sinus tenderness and frontal sinus tenderness present.      Mouth/Throat:      Pharynx: No posterior oropharyngeal erythema.      Tonsils: 1+ on the right. 1+ on the left.   Eyes:      General:         Right eye: No discharge.         Left eye: No discharge.   Cardiovascular:      Rate and Rhythm: Normal rate.      Heart sounds: Normal heart sounds. No murmur.   Pulmonary:      Effort: Pulmonary effort is normal. No respiratory distress.      Breath sounds: Normal breath sounds. No wheezing or rales.   Chest:      Chest wall: No tenderness.   Lymphadenopathy:      Cervical: No cervical adenopathy.   Skin:     General: Skin is warm and dry.      Capillary Refill: Capillary refill takes less than 2 seconds.      Findings: No rash.   Neurological:      Mental Status: She is alert and oriented to person, place, and time.   Psychiatric:         Mood and Affect: Mood normal.         Behavior: Behavior normal.         Thought Content: Thought content normal.         Judgment: Judgment normal.         Labs:  No results found for this or any previous visit (from the past 240 hour(s)).      Radiology:    No results found.    PATIENT INSTRUCTIONS:   Patient Instructions       Patient Education     Acute Bacterial Rhinosinusitis (ABRS)    Acute bacterial rhinosinusitis (ABRS) is an infection of your nasal cavity and sinuses. Its caused by bacteria. Acute means that youve had symptoms for less than 4 weeks, but possibly up to 12 weeks.  Understanding your  sinuses  The nasal cavity is the large air-filled space behind your nose. The sinuses are a group of spaces formed by the bones of your face. They connect with your nasal cavity. ABRS causes the tissue lining these spaces to become inflamed. Mucus may not drain normally. This leads to facial pain and other symptoms.  What causes ABRS?  ABRS most often follows an upper respiratory infection caused by a virus. Bacteria then infect the lining of your nasal cavity and sinuses. But you can also get ABRS if you have:    Nasal allergies    Long-term nasal swelling and congestion not caused by allergies    Blockage in the nose  Symptoms of ABRS  The symptoms of ABRS may be different for each person and include:    Nasal congestion or blockage    Pain or pressure in the face    Thick, colored drainage from the nose  Other symptoms may include:    Runny nose    Fluid draining from the nose down the throat (postnasal drip)    Headache    Cough    Pain    Fever  Diagnosing ABRS  ABRS may be diagnosed if youve had an upper respiratory infection like a cold and cough for 10 or more days without improvement or with worsening symptoms. Your healthcare provider will ask about your symptoms and your medical history. The provider will check your vital signs, including your temperature. Youll have a physical exam. The healthcare provider will check your ears, nose, and throat. You likely wont need any tests. If ABRS comes back, you may have a culture or other tests.  Treatment for ABRS  Treatment may include:    Antibiotic medicine. This is for symptoms that last for at least 10 to 14 days.    Nasal corticosteroid medicine. Drops or spray used in the nose can lessen swelling and congestion.    Over-the-counter pain medicine. This is to lessen sinus pain and pressure.    Nasal decongestant medicine. Spray or drops may help to lessen congestion. Do not use them for more than a few days.    Salt wash (saline irrigation). This can help to  loosen mucus.  Possible complications of ABRS  ABRS may come back or become long-term (chronic). In rare cases, ABRS may cause complications such as:     Inflamed tissue around the brain and spinal cord (meningitis)    Inflamed tissue around the eyes (orbital cellulitis)    Inflamed bones around the sinuses (osteitis)  These problems may need to be treated in a hospital with intravenous (IV) antibiotic medicine or surgery.  When to call the healthcare provider  Call your healthcare provider if you have any of the following:    Symptoms that dont get better, or get worse    Symptoms that dont get better after 3 to 5 days on antibiotics    Trouble seeing    Swelling around your eyes    Confusion or trouble staying awake   Date Last Reviewed: 5/1/2017 2000-2019 [employer]. [address] [place] [address], [place] [address]. All rights reserved. This information is not intended as a substitute for professional medical care. Always follow your healthcare professional's instructions.

## 2021-07-03 NOTE — ADDENDUM NOTE
Addendum Note by Amira Kevin MD at 9/29/2020  2:26 PM     Author: Amira Kevin MD Service: -- Author Type: Physician    Filed: 9/29/2020  2:26 PM Encounter Date: 9/22/2020 Status: Signed    : Amira Kevin MD (Physician)    Addended by: AMIRA KEVIN on: 9/29/2020 02:26 PM        Modules accepted: Orders

## 2021-07-07 NOTE — TELEPHONE ENCOUNTER
Please do asthma check over phone. If score is less than 20, schedule an asthma check. Last time we checked an asthma control test, it was uncontrolled. Virtual visit is OK.

## 2021-08-02 DIAGNOSIS — J30.89 CHRONIC NONSEASONAL ALLERGIC RHINITIS DUE TO POLLEN: ICD-10-CM

## 2021-08-05 RX ORDER — FLUTICASONE PROPIONATE 50 MCG
SPRAY, SUSPENSION (ML) NASAL
Refills: 3 | OUTPATIENT
Start: 2021-08-05

## 2021-08-06 DIAGNOSIS — J30.89 CHRONIC NONSEASONAL ALLERGIC RHINITIS DUE TO POLLEN: Primary | ICD-10-CM

## 2021-08-06 RX ORDER — FLUTICASONE PROPIONATE 50 MCG
1 SPRAY, SUSPENSION (ML) NASAL DAILY
COMMUNITY
End: 2021-08-06

## 2021-08-06 NOTE — TELEPHONE ENCOUNTER
Reason for Call:  Medication or medication refill:    Do you use a [facility]?  Name of the pharmacy and phone number for the current request:  [facility] mail order     Name of the medication requested: fluticasone propionate   Other request: 3 refills given 05/08/21 she has used those and needs new  rx    Can we leave a detailed message on this number? YES    Phone number patient can be reached at: Home number on file [phone] (home)    Best Time: anytime    Call taken on 8/6/2021 at 12:12 PM by Aura Jacobson

## 2021-08-09 RX ORDER — FLUTICASONE PROPIONATE 50 MCG
1 SPRAY, SUSPENSION (ML) NASAL DAILY
Qty: 16 G | Refills: 0 | Status: SHIPPED | OUTPATIENT
Start: 2021-08-09 | End: 2021-11-19

## 2021-08-09 NOTE — TELEPHONE ENCOUNTER
"Last Written Prescription Date:  5/8/21  Last Fill Quantity: 16g,  # refills: 3   Last office visit provider:   10/11/2020, acute visit.     Requested Prescriptions   Pending Prescriptions Disp Refills     fluticasone (FLONASE) 50 MCG/ACT nasal spray       Sig: Spray 1 spray into both nostrils daily       Nasal Allergy Protocol Passed - 8/6/2021  2:04 PM        Passed - Patient is age 12 or older        Passed - Recent (12 mo) or future (30 days) visit within the authorizing provider's specialty     Patient has had an office visit with the authorizing provider or a provider within the authorizing providers department within the previous 12 mos or has a future within next 30 days. See \"Patient Info\" tab in inbasket, or \"Choose Columns\" in Meds & Orders section of the refill encounter.              Passed - Medication is active on med list         Signed Prescriptions Disp Refills    fluticasone (FLONASE) 50 MCG/ACT nasal spray       Sig: Spray 1 spray into both nostrils daily       There is no refill protocol information for this order          Lynnette Jennings RN 08/09/21 3:16 PM  "

## 2021-08-28 ENCOUNTER — E-VISIT (OUTPATIENT)
Dept: URGENT CARE | Facility: CLINIC | Age: 41
End: 2021-08-28
Payer: COMMERCIAL

## 2021-08-28 ENCOUNTER — LAB (OUTPATIENT)
Dept: FAMILY MEDICINE | Facility: CLINIC | Age: 41
End: 2021-08-28
Attending: PHYSICIAN ASSISTANT

## 2021-08-28 DIAGNOSIS — Z20.822 SUSPECTED COVID-19 VIRUS INFECTION: Primary | ICD-10-CM

## 2021-08-28 DIAGNOSIS — Z20.822 SUSPECTED COVID-19 VIRUS INFECTION: ICD-10-CM

## 2021-08-28 PROCEDURE — U0003 INFECTIOUS AGENT DETECTION BY NUCLEIC ACID (DNA OR RNA); SEVERE ACUTE RESPIRATORY SYNDROME CORONAVIRUS 2 (SARS-COV-2) (CORONAVIRUS DISEASE [COVID-19]), AMPLIFIED PROBE TECHNIQUE, MAKING USE OF HIGH THROUGHPUT TECHNOLOGIES AS DESCRIBED BY CMS-2020-01-R: HCPCS

## 2021-08-28 PROCEDURE — 99421 OL DIG E/M SVC 5-10 MIN: CPT | Performed by: PHYSICIAN ASSISTANT

## 2021-08-28 PROCEDURE — U0005 INFEC AGEN DETEC AMPLI PROBE: HCPCS

## 2021-08-28 NOTE — PATIENT INSTRUCTIONS
Dear Sandhya Caal,    Your symptoms show that you may have coronavirus (COVID-19). This illness can cause fever, cough and trouble breathing. Many people get a mild case and get better on their own. Some people can get very sick.    Will I be tested for COVID-19?  We would like to test you for Covid-19 virus. I have placed orders for this test.     For all employees or close contacts (except [employer] [facility] and [employer] - see below), go to your [employer] home page and scroll down to the section that says  You have an appointment that needs to be scheduled  and click the large green button that says  Schedule Now  and follow the steps to find the next available opening.     If you are unable to complete these steps or if you cannot find any available times, please call [phone] to schedule employee testing.     [facility] employees or close contacts, please call [phone].   [place] ([employer]) employees or close contacts call [phone].    Return to work/school/ guidance:  Please let your workplace manager and staffing office know when your quarantine ends     We can t give you an exact date as it depends on the above. You can calculate this on your own or work with your manager/staffing office to calculate this. (For example if you were exposed on 10/4, you would have to quarantine for 14 full days. That would be through 10/18. You could return on 10/19.)      If you receive a positive COVID-19 test result, follow the guidance of the those who are giving you the results. Usually the return to work is 10 (or in some cases 20 days from symptom onset.) If you work at [employer], you must also be cleared by Employee Occupational Health and Safety to return to work.        If you receive a negative COVID-19 test result and did not have a high risk exposure to someone with a known positive COVID-19 test, you can return to work once you're free of fever for 24 hours without fever-reducing medication and  your symptoms are improving or resolved.      If you receive a negative COVID-19 test and If you had a high risk exposure to someone who has tested positive for COVID-19 then you can return to work 14 days after your last contact with the positive individual    Note: If you have ongoing exposure to the covid positive person, this quarantine period may be more than 14 days. (For example, if you are continued to be exposed to your child who tested positive and cannot isolate from them, then the quarantine of 7-14 days can't start until your child is no longer contagious. This is typically 10 days from onset of the child's symptoms. So the total duration may be 17-24 days in this case.)    Sign up for "[employer]   We know it's scary to hear that you might have COVID-19. We want to track your symptoms to make sure you're okay over the next 2 weeks. Please look for an email from "[employer]"--this is a free, online program that we'll use to keep in touch. To sign up, follow the link in the email you will receive. Learn more at [email or web address]    How can I take care of myself?    Get lots of rest. Drink extra fluids (unless a doctor has told you not to)    Take Tylenol (acetaminophen) or ibuprofen for fever or pain. If you have liver or kidney problems, ask your family doctor if it's okay to take Tylenol o ibuprofen    If you have other health problems (like cancer, heart failure, an organ transplant or severe kidney disease): Call your specialty clinic if you don't feel better in the next 2 days.    Know when to call 911. Emergency warning signs include:  o Trouble breathing or shortness of breath  o Pain or pressure in the chest that doesn't go away  o Feeling confused like you haven't felt before, or not being able to wake up  o Bluish-colored lips or face    Where can I get more information?   [employer] [facility] - About COVID-19:   [email or web address]    CDC - What to Do If You're Sick:    www.cdc.gov/coronavirus/2019-ncov/about/steps-when-sick.html

## 2021-08-29 LAB — SARS-COV-2 RNA RESP QL NAA+PROBE: NEGATIVE

## 2021-09-25 ENCOUNTER — HEALTH MAINTENANCE LETTER (OUTPATIENT)
Age: 41
End: 2021-09-25

## 2021-10-07 ENCOUNTER — MYC MEDICAL ADVICE (OUTPATIENT)
Dept: FAMILY MEDICINE | Facility: CLINIC | Age: 41
End: 2021-10-07

## 2021-10-12 ENCOUNTER — APPOINTMENT (OUTPATIENT)
Dept: URGENT CARE | Facility: CLINIC | Age: 41
End: 2021-10-12
Payer: COMMERCIAL

## 2021-10-13 ENCOUNTER — OFFICE VISIT (OUTPATIENT)
Dept: FAMILY MEDICINE | Facility: CLINIC | Age: 41
End: 2021-10-13
Payer: COMMERCIAL

## 2021-10-13 VITALS
DIASTOLIC BLOOD PRESSURE: 80 MMHG | TEMPERATURE: 98.7 F | RESPIRATION RATE: 14 BRPM | WEIGHT: 165 LBS | SYSTOLIC BLOOD PRESSURE: 137 MMHG | OXYGEN SATURATION: 100 % | HEART RATE: 82 BPM | BODY MASS INDEX: 26.63 KG/M2

## 2021-10-13 DIAGNOSIS — J02.9 ACUTE PHARYNGITIS, UNSPECIFIED ETIOLOGY: ICD-10-CM

## 2021-10-13 DIAGNOSIS — R21 RASH: ICD-10-CM

## 2021-10-13 DIAGNOSIS — R07.0 THROAT PAIN: Primary | ICD-10-CM

## 2021-10-13 LAB
DEPRECATED S PYO AG THROAT QL EIA: NEGATIVE
GROUP A STREP BY PCR: NOT DETECTED

## 2021-10-13 PROCEDURE — 99213 OFFICE O/P EST LOW 20 MIN: CPT | Performed by: FAMILY MEDICINE

## 2021-10-13 PROCEDURE — 87651 STREP A DNA AMP PROBE: CPT | Performed by: FAMILY MEDICINE

## 2021-10-13 NOTE — PATIENT INSTRUCTIONS
Throat has no beefy red tonsils or exudate, there is no fever. Rapid strep test is negative.   Tylenol or ibuprofen as needed for pain.   Consider benadryl or zyrtec for rash. May use hydrocortisone topically as well.   Lozenges, honey, tea, popcicles etc for soothing the throat.   Strep confirmation test is pending. We will call only if positive to arrange treatment.   Consider doing Covid test tomorrow if strep confirmation test is negative.   Recheck if worse or no better or new symptoms develop.

## 2021-10-14 NOTE — PROGRESS NOTES
Assessment/Plan:   Throat pain  Rash  Acute pharyngitis, unspecified etiology  Likely a viral illness with rash, ST, fatigue and chills and mild stomachache. Also consider strep and she had possible exposure. No tonsillitis on exam, mild posterior throat redness, RST negative. Declined Covid test at this time.   - Streptococcus A Rapid Screen w/Reflex to PCR - Clinic Collect  - Group A Streptococcus PCR Throat Swab    I discussed red flag symptoms, return precautions to clinic/ER and follow up care with patient/parent.  Expected clinical course, symptomatic cares advised. Questions answered. Patient/parent amenable with plan.    Throat has no beefy red tonsils or exudate, to suggest strep, there is no fever. Rapid strep test is negative.   Tylenol or ibuprofen as needed for pain.   Consider benadryl or zyrtec for rash. May use hydrocortisone topically as well.   Lozenges, honey, tea, popcicles etc for soothing the throat.   Strep confirmation test is pending. We will call only if positive to arrange treatment.   Consider doing Covid test tomorrow if strep confirmation test is negative.   Recheck if worse or no better or new symptoms develop.     Subjective:     Sandhya Caal is a 41 year old female who presents for evaluation of throat pain. Her throat became painful yesterday, Tuesday. She also developed a rash on her chest, mild stomachache/cramping, fatigue and chills.   No fever, no N/V/D, no cough or nasal congestion.   She attended the [employer] game with a friend on Sunday, did not wear masks in the seats. Did cheer loudly.   At the time her friend her had no symptoms but then Monday had ST - yesterday she was tested for strep and covid and is positive strep, negative covid. Her friends kids were diagnosed with strep on Friday.   No other ill contacts.   She had Covid vaccine x 2  No new detergents or soaps.      Allergies   Allergen Reactions     Morphine Unknown     Theophylline Dizziness and Palpitations      Annotation: Likely a toxicity issue       Current Outpatient Medications   Medication     acetaminophen (TYLENOL) 500 MG tablet     albuterol (VENTOLIN HFA) 90 mcg/actuation inhaler     cholecalciferol, vitamin D3, 2,000 unit capsule     cyanocobalamin, vitamin B-12, (VITAMIN B-12) 1,000 mcg tablet     fexofenadine (ALLEGRA) 180 MG tablet     fluticasone (FLONASE) 50 MCG/ACT nasal spray     fluticasone propionate (FLONASE) 50 mcg/actuation nasal spray     gabapentin (NEURONTIN) 100 MG capsule     guaiFENesin ER (MUCINEX) 600 mg 12 hr tablet     hydrOXYzine pamoate (VISTARIL) 50 MG capsule     Lactobacillus rhamnosus GG (CULTURELLE) 10-15 Billion cell capsule     montelukast (SINGULAIR) 10 mg tablet     omeprazole (PRILOSEC OTC) 20 MG tablet     QVAR REDIHALER 80 mcg/actuation HFAB inhaler     sertraline (ZOLOFT) 50 MG tablet     No current facility-administered medications for this visit.     Patient Active Problem List   Diagnosis   (none) - all problems resolved or deleted       Objective:     /80   Pulse 82   Temp 98.7  F (37.1  C) (Oral)   Resp 14   Wt 74.8 kg (165 lb)   LMP 10/10/2021 (Approximate)   SpO2 100%   BMI 26.63 kg/m      Physical  General Appearance: Alert, pleasant, no distress  Head: Normocephalic, without obvious abnormality, atraumatic  Eyes: Conjunctivae are normal.   Ears: Normal TMs and external ear canals, both ears  Nose: No significant congestion.  Throat: Throat is red posteriorly, uvula midline.  No exudate.  No vesicular lesions  Neck: No adenopathy  Lungs: Clear to auscultation bilaterally, respirations unlabored  Heart: Regular rate and rhythm  Abdomen: Soft, non-tender  Skin: diffuse maculopapular rash on chest, slightly itchy   Psychiatric: Patient has a normal mood and affect.       Results for orders placed or performed in visit on 10/13/21   Streptococcus A Rapid Screen w/Reflex to PCR - Clinic Collect     Status: Normal    Specimen: Throat; Swab   Result Value Ref  Range    Group A Strep antigen Negative Negative

## 2021-11-19 ENCOUNTER — OFFICE VISIT (OUTPATIENT)
Dept: FAMILY MEDICINE | Facility: CLINIC | Age: 41
End: 2021-11-19
Payer: COMMERCIAL

## 2021-11-19 VITALS
BODY MASS INDEX: 26.21 KG/M2 | DIASTOLIC BLOOD PRESSURE: 77 MMHG | SYSTOLIC BLOOD PRESSURE: 119 MMHG | HEIGHT: 67 IN | WEIGHT: 167 LBS | HEART RATE: 64 BPM | RESPIRATION RATE: 16 BRPM | TEMPERATURE: 98 F

## 2021-11-19 DIAGNOSIS — N92.0 MENORRHAGIA WITH REGULAR CYCLE: ICD-10-CM

## 2021-11-19 DIAGNOSIS — M26.623 BILATERAL TEMPOROMANDIBULAR JOINT PAIN: ICD-10-CM

## 2021-11-19 DIAGNOSIS — F33.0 MILD EPISODE OF RECURRENT MAJOR DEPRESSIVE DISORDER (H): ICD-10-CM

## 2021-11-19 DIAGNOSIS — J45.20 MILD INTERMITTENT ASTHMA WITHOUT COMPLICATION: ICD-10-CM

## 2021-11-19 DIAGNOSIS — L30.1 DYSHIDROTIC ECZEMA: ICD-10-CM

## 2021-11-19 DIAGNOSIS — Z11.59 NEED FOR HEPATITIS C SCREENING TEST: ICD-10-CM

## 2021-11-19 DIAGNOSIS — Z12.31 VISIT FOR SCREENING MAMMOGRAM: ICD-10-CM

## 2021-11-19 DIAGNOSIS — G57.13 MERALGIA PARESTHETICA, BILATERAL LOWER LIMBS: ICD-10-CM

## 2021-11-19 DIAGNOSIS — Z00.00 ROUTINE GENERAL MEDICAL EXAMINATION AT A HEALTH CARE FACILITY: Primary | ICD-10-CM

## 2021-11-19 DIAGNOSIS — D50.0 IRON DEFICIENCY ANEMIA DUE TO CHRONIC BLOOD LOSS: ICD-10-CM

## 2021-11-19 DIAGNOSIS — G57.62 MORTON NEUROMA, LEFT: ICD-10-CM

## 2021-11-19 DIAGNOSIS — F41.1 GENERALIZED ANXIETY DISORDER: ICD-10-CM

## 2021-11-19 DIAGNOSIS — J30.89 CHRONIC NONSEASONAL ALLERGIC RHINITIS DUE TO POLLEN: ICD-10-CM

## 2021-11-19 DIAGNOSIS — F33.42 MAJOR DEPRESSIVE DISORDER, RECURRENT EPISODE, IN FULL REMISSION (H): ICD-10-CM

## 2021-11-19 PROBLEM — K21.9 LARYNGOPHARYNGEAL REFLUX: Status: ACTIVE | Noted: 2020-10-07

## 2021-11-19 PROBLEM — Z91.09 OTHER ALLERGY, OTHER THAN TO MEDICINAL AGENTS: Status: ACTIVE | Noted: 2021-11-19

## 2021-11-19 PROBLEM — G43.909 MIGRAINE HEADACHE: Status: ACTIVE | Noted: 2021-11-19

## 2021-11-19 PROBLEM — M79.7 FIBROMYALGIA: Status: ACTIVE | Noted: 2021-11-19

## 2021-11-19 PROCEDURE — 99396 PREV VISIT EST AGE 40-64: CPT | Performed by: FAMILY MEDICINE

## 2021-11-19 RX ORDER — MONTELUKAST SODIUM 10 MG/1
10 TABLET ORAL AT BEDTIME
Qty: 90 TABLET | Refills: 4 | Status: SHIPPED | OUTPATIENT
Start: 2021-11-19 | End: 2022-12-28

## 2021-11-19 RX ORDER — ALBUTEROL SULFATE 90 UG/1
2 AEROSOL, METERED RESPIRATORY (INHALATION) EVERY 4 HOURS PRN
Qty: 18 G | Refills: 3 | Status: SHIPPED | OUTPATIENT
Start: 2021-11-19 | End: 2023-09-15

## 2021-11-19 RX ORDER — BECLOMETHASONE DIPROPIONATE HFA 80 UG/1
2 AEROSOL, METERED RESPIRATORY (INHALATION) 2 TIMES DAILY
Qty: 31.8 G | Refills: 4 | Status: SHIPPED | OUTPATIENT
Start: 2021-11-19 | End: 2022-12-28

## 2021-11-19 RX ORDER — FEXOFENADINE HCL 180 MG/1
180 TABLET ORAL DAILY
Qty: 90 TABLET | Refills: 4 | Status: SHIPPED | OUTPATIENT
Start: 2021-11-19

## 2021-11-19 RX ORDER — GABAPENTIN 100 MG/1
100 CAPSULE ORAL 3 TIMES DAILY PRN
Qty: 30 CAPSULE | Refills: 2 | Status: SHIPPED | OUTPATIENT
Start: 2021-11-19 | End: 2023-09-15

## 2021-11-19 ASSESSMENT — ASTHMA QUESTIONNAIRES
QUESTION_3 LAST FOUR WEEKS HOW OFTEN DID YOUR ASTHMA SYMPTOMS (WHEEZING, COUGHING, SHORTNESS OF BREATH, CHEST TIGHTNESS OR PAIN) WAKE YOU UP AT NIGHT OR EARLIER THAN USUAL IN THE MORNING: ONCE OR TWICE
QUESTION_5 LAST FOUR WEEKS HOW WOULD YOU RATE YOUR ASTHMA CONTROL: WELL CONTROLLED
QUESTION_2 LAST FOUR WEEKS HOW OFTEN HAVE YOU HAD SHORTNESS OF BREATH: ONCE OR TWICE A WEEK
QUESTION_4 LAST FOUR WEEKS HOW OFTEN HAVE YOU USED YOUR RESCUE INHALER OR NEBULIZER MEDICATION (SUCH AS ALBUTEROL): ONCE A WEEK OR LESS
QUESTION_1 LAST FOUR WEEKS HOW MUCH OF THE TIME DID YOUR ASTHMA KEEP YOU FROM GETTING AS MUCH DONE AT WORK, SCHOOL OR AT HOME: A LITTLE OF THE TIME
ACT_TOTALSCORE: 20

## 2021-11-19 ASSESSMENT — MIFFLIN-ST. JEOR: SCORE: 1455.14

## 2021-11-19 NOTE — PROGRESS NOTES
Health Maintenance Exam  [facility]  Date of Service: Nov 19, 2021    Subjective   Sandhya Caal is a 41 year old female who presents for a routine physical exam.     Current concerns include the following:  None    Patient Active Problem List    Diagnosis Date Noted     Other allergy, other than to medicinal agents 11/19/2021     Priority: Medium     Mild intermittent asthma without complication 11/19/2021     Priority: Medium     Exercise induced.       Fibromyalgia 11/19/2021     Priority: Medium     Laryngopharyngeal reflux 10/07/2020     Priority: Medium     TMJ arthralgia 10/02/2015     Priority: Medium     Uses mouth guard.       Paniagua neuroma, left 10/02/2015     Priority: Medium     Sees chiropractor       Generalized anxiety disorder 01/19/2015     Priority: Medium     Migraine headache 11/19/2021     Priority: Low     Major depressive disorder, recurrent episode, in full remission (H) 11/19/2021     Priority: Low     PVC (premature ventricular contraction) 12/02/2016     Priority: Low      Past Medical History:   Diagnosis Date     Dyshidrotic eczema 12/2/2016    Left 4th finger     Iron deficiency anemia 12/2/2016    Due to menorrhagia. Treated with endometrial ablation.     Major depressive disorder, recurrent episode (H)      Menorrhagia 12/2/2016    Treated with endometrial ablation.      Past Surgical History:   Procedure Laterality Date     ENDOMETRIAL ABLATION  12/2018     SINUS SURGERY Right 02/01/2006    postoperative nausea + vomiting. Postop pain severe.     TUBAL LIGATION  12/01/2016    with lysis of adhesions. Dr. Iman Condon [facility]      Current Outpatient Medications   Medication Sig Dispense Refill     albuterol (PROAIR HFA/PROVENTIL HFA/VENTOLIN HFA) 108 (90 Base) MCG/ACT inhaler Inhale 2 puffs into the lungs every 4 hours as needed for wheezing 18 g 3     beclomethasone HFA (QVAR REDIHALER) 80 MCG/ACT inhaler Inhale 2 puffs into the lungs 2  times daily 31.8 g 4     cholecalciferol, vitamin D3, 2,000 unit capsule [CHOLECALCIFEROL, VITAMIN D3, 2,000 UNIT CAPSULE] Take 2,000 Units by mouth daily.        cyanocobalamin, vitamin B-12, (VITAMIN B-12) 1,000 mcg tablet [CYANOCOBALAMIN, VITAMIN B-12, (VITAMIN B-12) 1,000 MCG TABLET] Take 1,000 mcg by mouth daily as needed.       fexofenadine (ALLEGRA) 180 MG tablet Take 1 tablet (180 mg) by mouth daily 90 tablet 4     fluticasone propionate (FLONASE) 50 mcg/actuation nasal spray [FLUTICASONE PROPIONATE (FLONASE) 50 MCG/ACTUATION NASAL SPRAY] INSTILL TWO SPRAYS INTO EACH NOSTRIL ONCE DAILY 16 g 3     gabapentin (NEURONTIN) 100 MG capsule Take 1 capsule (100 mg) by mouth 3 times daily as needed for neuropathic pain 30 capsule 2     montelukast (SINGULAIR) 10 MG tablet Take 1 tablet (10 mg) by mouth At Bedtime 90 tablet 4     sertraline (ZOLOFT) 50 MG tablet Take 3 tablets (150 mg) by mouth daily 270 tablet 3     acetaminophen (TYLENOL) 500 MG tablet [ACETAMINOPHEN (TYLENOL) 500 MG TABLET] Take 1,000 mg by mouth every 6 (six) hours as needed for pain.       guaiFENesin ER (MUCINEX) 600 mg 12 hr tablet [GUAIFENESIN ER (MUCINEX) 600 MG 12 HR TABLET] Take 1,200 mg by mouth 2 (two) times a day as needed.        hydrOXYzine pamoate (VISTARIL) 50 MG capsule [HYDROXYZINE PAMOATE (VISTARIL) 50 MG CAPSULE] TAKE 1 CAPSULE (50 MG TOTAL) BY MOUTH AT BEDTIME AS NEEDED FOR ANXIETY (AND INSOMNIA). 50 capsule 1     Lactobacillus rhamnosus GG (CULTURELLE) 10-15 Billion cell capsule [LACTOBACILLUS RHAMNOSUS GG (CULTURELLE) 10-15 BILLION CELL CAPSULE] Take 1 capsule by mouth daily.       omeprazole (PRILOSEC OTC) 20 MG tablet [OMEPRAZOLE (PRILOSEC OTC) 20 MG TABLET]         Allergies   Allergen Reactions     Morphine Unknown     Theophylline Dizziness and Palpitations     Annotation: Likely a toxicity issue        Social History     Socioeconomic History     Marital status:      Spouse name: Newton Grosshlen     Number of  children: 2     Years of education: Bachelors degree     Highest education level: Not on file   Occupational History     Occupation:    Tobacco Use     Smoking status: Never Smoker     Smokeless tobacco: Never Used   Vaping Use     Vaping Use: Never used   Substance and Sexual Activity     Alcohol use: Yes     Comment: Very occasional     Drug use: Never     Sexual activity: Yes     Partners: Male     Birth control/protection: Surgical     Comment: Tubal ligation 2016   Other Topics Concern     Not on file   Social History Narrative    : Newton Caal  Kids: Luiz 2012, Leroy 2014.      Diet: Increasing protein, eats fruits and veggies.  Calcium + D - supplements.    Exercise: yoga, running, weights. Skiing.     Social Determinants of Health     Financial Resource Strain: Not on file   Food Insecurity: Not on file   Transportation Needs: Not on file   Physical Activity: Not on file   Stress: Not on file   Social Connections: Not on file   Intimate Partner Violence: Not on file   Housing Stability: Not on file      Family History   Problem Relation Age of Onset     Hypertension Mother      Depression Mother      Hyperlipidemia Mother      Other - See Comments Mother         Back problems - spinal stenosis. Multiple surgeries. Bronchiectasis. Lichen sclerosis     Osteoarthritis Mother      Kidney Disease Mother         Long term Vioxx use     Osteoporosis Mother      Hypertension Father      Hyperlipidemia Father      Arrhythmia Father         Atrial fibrillation, had ablation     Memory loss Father      Osteoarthritis Father      Hypertension Sister      Anxiety Disorder Sister      Depression Sister      Anxiety Disorder Sister      Depression Brother         seasonal     Rheumatoid Arthritis Maternal Grandmother      Osteoporosis Maternal Grandmother      Heart Disease Maternal Grandfather      Other - See Comments Paternal Grandmother 102        Longevity     Kidney Disease Paternal Grandfather   "    Asthma Son      No Known Problems Son      Endometriosis Maternal Aunt      Breast Cancer Maternal Aunt         In-situ?     Breast Cancer Other      Heart Disease Maternal Uncle 78     Thyroid Cancer Maternal Cousin       REVIEW OF SYSTEMS: negative, except as listed in subjective above.    Physical Exam   /77 (BP Location: Left arm, Patient Position: Sitting, Cuff Size: Adult Regular)   Pulse 64   Temp 98  F (36.7  C) (Temporal)   Resp 16   Ht 1.702 m (5' 7\")   Wt 75.8 kg (167 lb)   LMP 11/13/2021   BMI 26.16 kg/m     History   Smoking Status     Never Smoker   Smokeless Tobacco     Never Used     General: Alert, NAD.  Head: NC/AT.  Ears: Normal canal, pinnae and tympanic membrane.  Eyes: PERRL, normal fundi.  Nose: Normal mucosa.  Mouth: Adequate dentition, normal throat.  Neck: normal thyroid, midline trachea.  Breasts: no masses, skin changes, nipple discharge or tenderness.  Lungs: CTA bilaterally, no increased work of breathing.  Heart: RRR, no m/r/g  Abdomen: soft, nt/nd, BS+  Genitourinary: Not examined  Skin: no atypical lesion or rash.  Lymphatics: no lymphadenopathy.   Psych: normal affect.      Assessment & Plan   1. Health Maintenance  o Cancer screening: 3D mammogram ordered - very dense breast tissue on prior mammogram.  o Bone Health: Discussed Calcium, vitamin D, and weight bearing exercise.  o Immunizations: Reviewed and Immunizations are up to date. No immunizations given.  2. Body mass index is 26.16 kg/m ., Overweight. Discussed healthy lifestyle.  3. Reproductive plans: Not planning pregnancy. Contraception: tubal ligation.  4. Advance directive: form given. I have had an Advance Directives discussion with the patient..  5. Depression and anxiety. Depression in full remission on sertraline. Anxiety is high due to recent stressors.    Order Summary                                                      Routine general medical examination at a health care facility    Generalized " anxiety disorder  -     sertraline (ZOLOFT) 50 MG tablet; Take 3 tablets (150 mg) by mouth daily    Mild episode of recurrent major depressive disorder (H)  -     sertraline (ZOLOFT) 50 MG tablet; Take 3 tablets (150 mg) by mouth daily    Mild intermittent asthma without complication  -     beclomethasone HFA (QVAR REDIHALER) 80 MCG/ACT inhaler; Inhale 2 puffs into the lungs 2 times daily  -     fexofenadine (ALLEGRA) 180 MG tablet; Take 1 tablet (180 mg) by mouth daily  -     albuterol (PROAIR HFA/PROVENTIL HFA/VENTOLIN HFA) 108 (90 Base) MCG/ACT inhaler; Inhale 2 puffs into the lungs every 4 hours as needed for wheezing    Chronic nonseasonal allergic rhinitis due to pollen  -     montelukast (SINGULAIR) 10 MG tablet; Take 1 tablet (10 mg) by mouth At Bedtime  -     fexofenadine (ALLEGRA) 180 MG tablet; Take 1 tablet (180 mg) by mouth daily    Meralgia paresthetica, bilateral lower limbs  -     gabapentin (NEURONTIN) 100 MG capsule; Take 1 capsule (100 mg) by mouth 3 times daily as needed for neuropathic pain    Dyshidrotic eczema    Iron deficiency anemia due to chronic blood loss    Menorrhagia with regular cycle    Paniagua neuroma, left    Bilateral temporomandibular joint pain    Major depressive disorder, recurrent episode, in full remission (H)    Need for hepatitis C screening test  -     Hepatitis C Screen Reflex to HCV RNA Quant and Genotype; Future    Visit for screening mammogram  -     MA Screen Bilateral w/Doron; Future    Other orders  -     REVIEW OF HEALTH MAINTENANCE PROTOCOL ORDERS        No future appointments.    Completed by: Amira Kevin M.D., [facility]. 11/19/2021 7:22 AM.  This transcription uses voice recognition software, which may contain typographical errors.    Answers for HPI/ROS submitted by the patient on 11/16/2021  Frequency of exercise:: 2-3 days/week  Getting at least 3 servings of Calcium per day:: Yes  Diet:: Regular (no restrictions)  Taking medications  regularly:: Yes  Medication side effects:: None  Bi-annual eye exam:: Yes  Dental care twice a year:: Yes  Sleep apnea or symptoms of sleep apnea:: Daytime drowsiness  Additional concerns today:: No  Duration of exercise:: 30-45 minutes

## 2021-11-19 NOTE — PROGRESS NOTES
SUBJECTIVE:   CC: Sandhya Caal is an 41 year old woman who presents for preventive health visit.     {Split Bill scripting  The purpose of this visit is to discuss your medical history and prevent health problems before you are sick. You may be responsible for a co-pay, coinsurance, or deductible if your visit today includes services such as checking on a sore throat, having an x-ray or lab test, or treating and evaluating a new or existing condition :084507}  Patient has been advised of split billing requirements and indicates understanding: {Yes and No:436808}  HPI  {Add if <65 person on Medicare  - Required Questions (Optional):854539}  {Outside tests to abstract? :689489}    {additional problems to add (Optional):695017}    Today's PHQ-2 Score:   PHQ-2 ( 1999 Pfizer) 11/16/2021   Q1: Little interest or pleasure in doing things 0   Q2: Feeling down, depressed or hopeless 0   PHQ-2 Score 0   Q1: Little interest or pleasure in doing things Not at all   Q2: Feeling down, depressed or hopeless Not at all   PHQ-2 Score 0       Abuse: Current or Past (Physical, Sexual or Emotional) - { :809490}  Do you feel safe in your environment? { :316931}        Social History     Tobacco Use     Smoking status: Never Smoker     Smokeless tobacco: Never Used   Substance Use Topics     Alcohol use: Yes     Comment: Alcoholic Drinks/day: 2/week     {Rooming Staff- Complete this question if Prescreen response is not shown below for today's visit. If you drink alcohol do you typically have >3 drinks per day or >7 drinks per week? (Optional):624503}    Alcohol Use 11/16/2021   Prescreen: >3 drinks/day or >7 drinks/week? No   {add AUDIT responses (Optional) (A score of 7 for adult men is an indication of hazardous drinking; a score of 8 or more is an indication of an alcohol use disorder.  A score of 7 or more for adult women is an indication of hazardous drinking or an alchohol use disorder):008257}    Reviewed orders with  "patient.  Reviewed health maintenance and updated orders accordingly - { :282135::\"Yes\"}  {Chronicprobdata (optional):039686}    Breast Cancer Screening:    FHS-7:   Breast CA Risk Assessment (FHS-7) 11/16/2021   Did any of your first-degree relatives have breast or ovarian cancer? No   Did any of your relatives have bilateral breast cancer? No   Did any man in your family have breast cancer? No   Did any woman in your family have breast and ovarian cancer? No   Did any woman in your family have breast cancer before age 50 y? No   Do you have 2 or more relatives with breast and/or ovarian cancer? No   Do you have 2 or more relatives with breast and/or bowel cancer? No     {If any of the questions to the BCRA (FHS-7) are answered yes, consider ordering referral for genetic counseling (Optional) :353331::\"click delete button to remove this line now\"}  {AMB Mammogram Decision Support (Optional) :342489}  Pertinent mammograms are reviewed under the imaging tab.    History of abnormal Pap smear: { :194395}  PAP / HPV Latest Ref Rng & Units 9/24/2020 5/10/2019 12/1/2017   PAP Negative for squamous intraepithelial lesion or malignancy. Negative for squamous intraepithelial lesion or malignancy  Electronically signed by Claudio Diane CT (ASCP) on 10/5/2020 at  1:02 PM   Negative for squamous intraepithelial lesion or malignancy  Electronically signed by Claudio Diane CT (ASCP) on 5/20/2019 at  2:34 PM   Negative for squamous intraepithelial lesion or malignancy  Electronically signed by Beverly Meng CT (ASCP) on 12/9/2017 at  6:09 PM     HPV16 NEG Negative Negative -   HPV18 NEG Negative Negative -   HRHPV NEG Negative Negative -     Reviewed and updated as needed this visit by clinical staff  Tobacco  Allergies  Meds             Reviewed and updated as needed this visit by Provider               {HISTORY OPTIONS (Optional):059487}    Review of Systems  {FEMALE ROS (Optional):214337}   " "  OBJECTIVE:   /77 (BP Location: Left arm, Patient Position: Sitting, Cuff Size: Adult Regular)   Pulse 64   Temp 98  F (36.7  C) (Temporal)   Resp 16   Ht 1.702 m (5' 7\")   Wt 75.8 kg (167 lb)   LMP 11/13/2021   BMI 26.16 kg/m    Physical Exam  {Exam Choices (Optional):086356}    {Diagnostic Test Results (Optional):830285::\"Diagnostic Test Results:\",\"Labs reviewed in Epic\"}    ASSESSMENT/PLAN:   {Diag Picklist:880228}    Patient has been advised of split billing requirements and indicates understanding: {YES / NO:896026::\"Yes\"}  COUNSELING:  {FEMALE COUNSELING MESSAGES:186072::\"Reviewed preventive health counseling, as reflected in patient instructions\"}    Estimated body mass index is 26.16 kg/m  as calculated from the following:    Height as of this encounter: 1.702 m (5' 7\").    Weight as of this encounter: 75.8 kg (167 lb).    {Weight Management Plan (ACO) Complete if BMI is abnormal-  Ages 18-64  BMI >24.9.  Age 65+ with BMI <23 or >30 (Optional):568781}    She reports that she has never smoked. She has never used smokeless tobacco.      Counseling Resources:  ATP IV Guidelines  Pooled Cohorts Equation Calculator  Breast Cancer Risk Calculator  BRCA-Related Cancer Risk Assessment: FHS-7 Tool  FRAX Risk Assessment  ICSI Preventive Guidelines  Dietary Guidelines for Americans, 2010  USDA's MyPlate  ASA Prophylaxis  Lung CA Screening    Amira Kevin MD  [facility]  "

## 2021-11-19 NOTE — PATIENT INSTRUCTIONS
Heartburn or Gastroesophageal Reflux Disease (GERD)    1) Avoid these foods:     fatty, greasy, or oily foods    chocolate    tomatoes and tomato sauce (like pizza and spaghetti)    spices (like onion, pepper, garlic)    citrus like lemon, lime, orange, grapefruit    mint  2) Avoid these beverages:     alcohol    caffeine    juices (like orange juice, grapefruit juice, tomato juice, apple juice)    coffee    tea    pop    other carbonated beverages  3) Eat smaller meals.  4) Don't eat before bedtime.  5) Avoid tight fitting clothes.    Bone Health   Prevention of Fracture from Osteoporosis.    Calcium  Three servings a day (goal 1200 mg every day) - each serving can be 500-600 mg    Dairy: Milk, yogurt, and cheese (choose low sugar and low fat options).    Greens: chinese cabbage, kale, and broccoli.     Calcium fortified foods: many fruit juices and drinks, tofu, and cereals.   Only absorb one serving at a time  If you can't get enough from diet: take supplements: Calcium 600 mg, no more than twice a day    Vitamin D  Take 2000 Units every day    Exercise  Weight-bearing exercise (like walking)  30 minutes, most days    Habits  Avoid smoking  Limit alcohol     Fall Prevention  Work on balance + strength  Make sure you have adequate lighting inside and outside the home  Avoid walking on slippery surfaces such as ice or wet floors  Remove tripping hazards like loose rugs  Use caution on the way to the bathroom at night    Preventive Health Recommendations  Female Ages 40 to 49    Yearly exam:     See your health care provider every year in order to  1. Review health changes.   2. Discuss preventive care.    3. Review your medicines if your doctor prescribed any.      Get a Pap test every three years (unless you have an abnormal result and your provider advises testing more often).      If you get Pap tests with HPV test, you only need to test every 5 years, unless you have an abnormal result. You do not need a Pap  test if your uterus was removed (hysterectomy) and you have not had cancer.      You should be tested each year for STDs (sexually transmitted diseases), if you're at risk.     Ask your doctor if you should have a mammogram.      Have a colonoscopy (test for colon cancer) if someone in your family has had colon cancer or polyps before age 50.       Have a cholesterol test every 5 years.       Have a diabetes test (fasting glucose) after age 45. If you are at risk for diabetes, you should have this test every 3 years.    Shots: Get a flu shot each year. Get a tetanus shot every 10 years.     Nutrition:     Eat at least 5 servings of fruits and vegetables each day.    Eat whole-grain bread, whole-wheat pasta and brown rice instead of white grains and rice.    Get adequate Calcium and Vitamin D.      Lifestyle    Exercise at least 150 minutes a week (an average of 30 minutes a day, 5 days a week). This will help you control your weight and prevent disease.    Limit alcohol to one drink per day.    No smoking.     Wear sunscreen to prevent skin cancer.    See your dentist every six months for an exam and cleaning.

## 2021-11-20 ASSESSMENT — PATIENT HEALTH QUESTIONNAIRE - PHQ9: SUM OF ALL RESPONSES TO PHQ QUESTIONS 1-9: 0

## 2021-11-20 ASSESSMENT — ASTHMA QUESTIONNAIRES: ACT_TOTALSCORE: 20

## 2021-12-21 ENCOUNTER — E-VISIT (OUTPATIENT)
Dept: FAMILY MEDICINE | Facility: CLINIC | Age: 41
End: 2021-12-21
Payer: COMMERCIAL

## 2021-12-21 DIAGNOSIS — F41.1 GENERALIZED ANXIETY DISORDER: Primary | ICD-10-CM

## 2021-12-21 PROCEDURE — 99421 OL DIG E/M SVC 5-10 MIN: CPT | Performed by: FAMILY MEDICINE

## 2021-12-21 RX ORDER — BUSPIRONE HYDROCHLORIDE 5 MG/1
5 TABLET ORAL 2 TIMES DAILY
Qty: 60 TABLET | Refills: 1 | Status: SHIPPED | OUTPATIENT
Start: 2021-12-21 | End: 2022-01-24

## 2021-12-21 ASSESSMENT — ANXIETY QUESTIONNAIRES
GAD7 TOTAL SCORE: 11
3. WORRYING TOO MUCH ABOUT DIFFERENT THINGS: NEARLY EVERY DAY
7. FEELING AFRAID AS IF SOMETHING AWFUL MIGHT HAPPEN: NOT AT ALL
6. BECOMING EASILY ANNOYED OR IRRITABLE: MORE THAN HALF THE DAYS
4. TROUBLE RELAXING: SEVERAL DAYS
5. BEING SO RESTLESS THAT IT IS HARD TO SIT STILL: NOT AT ALL
GAD7 TOTAL SCORE: 11
7. FEELING AFRAID AS IF SOMETHING AWFUL MIGHT HAPPEN: NOT AT ALL
GAD7 TOTAL SCORE: 11
1. FEELING NERVOUS, ANXIOUS, OR ON EDGE: NEARLY EVERY DAY
2. NOT BEING ABLE TO STOP OR CONTROL WORRYING: MORE THAN HALF THE DAYS
8. IF YOU CHECKED OFF ANY PROBLEMS, HOW DIFFICULT HAVE THESE MADE IT FOR YOU TO DO YOUR WORK, TAKE CARE OF THINGS AT HOME, OR GET ALONG WITH OTHER PEOPLE?: VERY DIFFICULT

## 2021-12-21 NOTE — PATIENT INSTRUCTIONS
Thank you for choosing us for your care. I have placed an order for a prescription so that you can start treatment. View your full visit summary for details by clicking on the link below. Your pharmacist will able to address any questions you may have about the medication.      If you're not feeling better within 4-6 weeks please schedule an appointment.  You can schedule an appointment right here in [facility], or call [phone]  If the visit is for the same symptoms as your eVisit, we'll refund the cost of your eVisit if seen within seven days.

## 2021-12-21 NOTE — LETTER
My Depression Action Plan  Name: Sandhya Caal   Date of Birth 1980  Date: 12/21/2021    My doctor: Amira Kevin   My clinic: [facility] [address] [place] [id number] [phone]          GREEN    ZONE   Good Control    What it looks like:     Things are going generally well. You have normal ups and downs. You may even feel depressed from time to time, but bad moods usually last less than a day.   What you need to do:  1. Continue to care for yourself (see self care plan)  2. Check your depression survival kit and update it as needed  3. Follow your physician s recommendations including any medication.  4. Do not stop taking medication unless you consult with your physician first.           YELLOW         ZONE Getting Worse    What it looks like:     Depression is starting to interfere with your life.     It may be hard to get out of bed; you may be starting to isolate yourself from others.    Symptoms of depression are starting to last most all day and this has happened for several days.     You may have suicidal thoughts but they are not constant.   What you need to do:     1. Call your care team. Your response to treatment will improve if you keep your care team informed of your progress. Yellow periods are signs an adjustment may need to be made.     2. Continue your self-care.  Just get dressed and ready for the day.  Don't give yourself time to talk yourself out of it.    3. Talk to someone in your support network.    4. Open up your Depression Self-Care Plan/Wellness Kit.           RED    ZONE Medical Alert - Get Help    What it looks like:     Depression is seriously interfering with your life.     You may experience these or other symptoms: You can t get out of bed most days, can t work or engage in other necessary activities, you have trouble taking care of basic hygiene, or basic responsibilities, thoughts of suicide or death that will not go  away, self-injurious behavior.     What you need to do:  1. Call your care team and request a same-day appointment. If they are not available (weekends or after hours) call your local crisis line, emergency room or 911.          Depression Self-Care Plan / Wellness Kit    Many people find that medication and therapy are helpful treatments for managing depression. In addition, making small changes to your everyday life can help to boost your mood and improve your wellbeing. Below are some tips for you to consider. Be sure to talk with your medical provider and/or behavioral health consultant if your symptoms are worsening or not improving.     Sleep   Sleep hygiene  means all of the habits that support good, restful sleep. It includes maintaining a consistent bedtime and wake time, using your bedroom only for sleeping or sex, and keeping the bedroom dark and free of distractions like a computer, smartphone, or television.     Develop a Healthy Routine  Maintain good hygiene. Get out of bed in the morning, make your bed, brush your teeth, take a shower, and get dressed. Don t spend too much time viewing media that makes you feel stressed. Find time to relax each day.    Exercise  Get some form of exercise every day. This will help reduce pain and release endorphins, the  feel good  chemicals in your brain. It can be as simple as just going for a walk or doing some gardening, anything that will get you moving.      Diet  Strive to eat healthy foods, including fruits and vegetables. Drink plenty of water. Avoid excessive sugar, caffeine, alcohol, and other mood-altering substances.     Stay Connected with Others  Stay in touch with friends and family members.    Manage Your Mood  Try deep breathing, massage therapy, biofeedback, or meditation. Take part in fun activities when you can. Try to find something to smile about each day.     Psychotherapy  Be open to working with a therapist if your provider recommends it.      Medication  Be sure to take your medication as prescribed. Most anti-depressants need to be taken every day. It usually takes several weeks for medications to work. Not all medicines work for all people. It is important to follow-up with your provider to make sure you have a treatment plan that is working for you. Do not stop your medication abruptly without first discussing it with your provider.    Crisis Resources   These hotlines are for both adults and children. They and are open 24 hours a day, 7 days a week unless noted otherwise.      National Suicide Prevention Lifeline   1-167-137-TALK (4202)      Crisis Text Line    www.crisistextline.org  Text HOME to 063502 from anywhere in the [place], anytime, about any type of crisis. A live, trained crisis counselor will receive the text and respond quickly.      [employer] Lifeline for LGBTQ Youth  A national crisis intervention and suicide lifeline for LGBTQ youth under 25. Provides a safe place to talk without judgement. Call 1-217.441.6406; text START to 324484 or visit www.thetrevorproject.org to talk to a trained counselor.      For [facility] crisis numbers, visit the [facility] website at:  https://[place].gov/dhs/people-we-serve/adults/health-care/mental-health/resources/crisis-contacts.jsp

## 2021-12-22 ASSESSMENT — ANXIETY QUESTIONNAIRES: GAD7 TOTAL SCORE: 11

## 2022-01-12 DIAGNOSIS — F41.1 GENERALIZED ANXIETY DISORDER: ICD-10-CM

## 2022-01-14 RX ORDER — BUSPIRONE HYDROCHLORIDE 5 MG/1
TABLET ORAL
Qty: 60 TABLET | Refills: 1 | OUTPATIENT
Start: 2022-01-14

## 2022-01-15 NOTE — TELEPHONE ENCOUNTER
"Last Written Prescription Date:  12/21/2021  Last Fill Quantity: 60,  # refills: 1    Last office visit provider:   11/19/2021 with Dr Kevin.    Requested Prescriptions   Pending Prescriptions Disp Refills     busPIRone (BUSPAR) 5 MG tablet [Pharmacy Med Name: BUSPIRONE HCL 5 MG TABLET] 60 tablet 1     Sig: TAKE 1 TABLET BY MOUTH TWICE A DAY       Atypical Antidepressants Protocol Passed - 1/12/2022  1:30 PM        Passed - Recent (12 mo) or future (30 days) visit within the authorizing provider's specialty     Patient has had an office visit with the authorizing provider or a provider within the authorizing providers department within the previous 12 mos or has a future within next 30 days. See \"Patient Info\" tab in inbasket, or \"Choose Columns\" in Meds & Orders section of the refill encounter.              Passed - Medication active on med list        Passed - Patient is age 18 or older        Passed - No active pregnancy on record        Passed - No positive pregnancy test in past 12 mos             Nolvia Shields 01/14/22 7:55 PM  "

## 2022-01-20 ENCOUNTER — MYC MEDICAL ADVICE (OUTPATIENT)
Dept: FAMILY MEDICINE | Facility: CLINIC | Age: 42
End: 2022-01-20
Payer: COMMERCIAL

## 2022-01-20 DIAGNOSIS — F41.1 GENERALIZED ANXIETY DISORDER: ICD-10-CM

## 2022-01-24 RX ORDER — BUSPIRONE HYDROCHLORIDE 5 MG/1
5 TABLET ORAL 2 TIMES DAILY
Qty: 180 TABLET | Refills: 3 | Status: SHIPPED | OUTPATIENT
Start: 2022-01-24 | End: 2023-03-27

## 2022-01-24 NOTE — TELEPHONE ENCOUNTER
Diagnoses and all orders for this visit:    Generalized anxiety disorder  -     busPIRone (BUSPAR) 5 MG tablet; Take 1 tablet (5 mg) by mouth 2 times daily      [facility] - [place], [place] - [facility] [address] [facility] [address] [address]  Phone: [phone] Fax: [phone]

## 2022-03-20 ENCOUNTER — OFFICE VISIT (OUTPATIENT)
Dept: FAMILY MEDICINE | Facility: CLINIC | Age: 42
End: 2022-03-20
Payer: COMMERCIAL

## 2022-03-20 VITALS
WEIGHT: 169 LBS | RESPIRATION RATE: 14 BRPM | HEART RATE: 79 BPM | BODY MASS INDEX: 26.47 KG/M2 | DIASTOLIC BLOOD PRESSURE: 80 MMHG | OXYGEN SATURATION: 100 % | TEMPERATURE: 98.8 F | SYSTOLIC BLOOD PRESSURE: 133 MMHG

## 2022-03-20 DIAGNOSIS — M54.50 LUMBAR BACK PAIN: ICD-10-CM

## 2022-03-20 DIAGNOSIS — R30.0 DYSURIA: ICD-10-CM

## 2022-03-20 DIAGNOSIS — R39.89 URINARY PROBLEM: Primary | ICD-10-CM

## 2022-03-20 LAB
ALBUMIN UR-MCNC: NEGATIVE MG/DL
APPEARANCE UR: CLEAR
BILIRUB UR QL STRIP: NEGATIVE
COLOR UR AUTO: YELLOW
GLUCOSE UR STRIP-MCNC: NEGATIVE MG/DL
HGB UR QL STRIP: NEGATIVE
KETONES UR STRIP-MCNC: NEGATIVE MG/DL
LEUKOCYTE ESTERASE UR QL STRIP: NEGATIVE
NITRATE UR QL: NEGATIVE
PH UR STRIP: 6.5 [PH] (ref 5–8)
SP GR UR STRIP: 1.01 (ref 1–1.03)
UROBILINOGEN UR STRIP-ACNC: 0.2 E.U./DL

## 2022-03-20 PROCEDURE — 81003 URINALYSIS AUTO W/O SCOPE: CPT | Performed by: STUDENT IN AN ORGANIZED HEALTH CARE EDUCATION/TRAINING PROGRAM

## 2022-03-20 PROCEDURE — 99203 OFFICE O/P NEW LOW 30 MIN: CPT | Performed by: STUDENT IN AN ORGANIZED HEALTH CARE EDUCATION/TRAINING PROGRAM

## 2022-03-20 NOTE — PATIENT INSTRUCTIONS
Please use Pyridium to see if this will help with your urinary frequency and some burning with urination.  Please follow-up if symptoms not improved.

## 2022-03-20 NOTE — PROGRESS NOTES
"Assessment & Plan     Urinary problem  Dysuria   UA negative for findings of be consistent with UTI at this time.  As is, I do think that dysuria can be from local irritation.  I did recommend that she could take Pyridium to see if this will help with symptoms.  Recommended that she follow-up if she develops fevers, chills, suprapubic pain, or ongoing symptoms.   - UA macro with reflex to Microscopic and Culture - Clinc Collect    Lumbar back pain  I think that the lumbar back pain is a separate issue from the urinary problem.  Most consistent with musculoskeletal pain.  Recommended that she use Tylenol/ibuprofen as needed for this pain, and she lightly stretch her back this will help.    20 minutes spent on the date of the encounter doing chart review, history and exam, documentation and further activities per the note       BMI:   Estimated body mass index is 26.47 kg/m  as calculated from the following:    Height as of 11/19/21: 1.702 m (5' 7\").    Weight as of this encounter: 76.7 kg (169 lb).       Return in about 1 week (around 3/27/2022).    Yamini Delaney MD PGY2  [facility]   Sandhya Caal is a 42 year old who presents for the following health issues     HPI     Presents for few day history of low back pain and some urinary frequency.  In addition to the urinary frequency, she has been noticing some burning with urination.  Denies any changes in color to the urine in terms of blood or malodorous urine as well. Patient says that she has a history of UTI, and symptoms feel pretty similar to this.  She denies any lower abdominal pain, fevers, chills, nausea, vomiting.  She also noted some low back pain that started a couple days ago when this urinary frequency started.    Review of Systems   Complete ROS normal aside noted in HPI      Objective    /80   Pulse 79   Temp 98.8  F (37.1  C)   Resp 14   Wt 76.7 kg (169 lb)   LMP 03/04/2022   SpO2 100% "   Breastfeeding No   BMI 26.47 kg/m    Body mass index is 26.47 kg/m .    Physical Exam   GENERAL: healthy, alert and no distress  RESP: lungs clear to auscultation - no rales, rhonchi or wheezes  CV: regular rate and rhythm, normal S1 S2, no S3 or S4, no murmur, click or rub, no peripheral edema and peripheral pulses strong  ABDOMEN: soft, nontender, no hepatosplenomegaly, no masses and bowel sounds normal  MS: no gross musculoskeletal defects noted, no edema    Results for orders placed or performed in visit on 03/20/22 (from the past 24 hour(s))   UA macro with reflex to Microscopic and Culture - Clinc Collect    Specimen: Urine, Midstream   Result Value Ref Range    Color Urine Yellow Colorless, Straw, Light Yellow, Yellow    Appearance Urine Clear Clear    Glucose Urine Negative Negative mg/dL    Bilirubin Urine Negative Negative    Ketones Urine Negative Negative mg/dL    Specific Gravity Urine 1.010 1.005 - 1.030    Blood Urine Negative Negative    pH Urine 6.5 5.0 - 8.0    Protein Albumin Urine Negative Negative mg/dL    Urobilinogen Urine 0.2 0.2, 1.0 E.U./dL    Nitrite Urine Negative Negative    Leukocyte Esterase Urine Negative Negative    Narrative    Microscopic not indicated       ----- Service Performed and Documented by Resident or Fellow ------

## 2022-03-21 ENCOUNTER — OFFICE VISIT (OUTPATIENT)
Dept: FAMILY MEDICINE | Facility: CLINIC | Age: 42
End: 2022-03-21
Payer: COMMERCIAL

## 2022-03-21 ENCOUNTER — E-VISIT (OUTPATIENT)
Dept: URGENT CARE | Facility: CLINIC | Age: 42
End: 2022-03-21
Payer: COMMERCIAL

## 2022-03-21 VITALS
RESPIRATION RATE: 14 BRPM | BODY MASS INDEX: 26.63 KG/M2 | OXYGEN SATURATION: 100 % | DIASTOLIC BLOOD PRESSURE: 82 MMHG | TEMPERATURE: 98.1 F | SYSTOLIC BLOOD PRESSURE: 135 MMHG | HEART RATE: 79 BPM | WEIGHT: 170 LBS

## 2022-03-21 DIAGNOSIS — M54.50 ACUTE BILATERAL LOW BACK PAIN WITHOUT SCIATICA: ICD-10-CM

## 2022-03-21 DIAGNOSIS — N76.0 VAGINITIS AND VULVOVAGINITIS: Primary | ICD-10-CM

## 2022-03-21 DIAGNOSIS — R30.0 DYSURIA: Primary | ICD-10-CM

## 2022-03-21 LAB
ALBUMIN UR-MCNC: NEGATIVE MG/DL
APPEARANCE UR: CLEAR
BACTERIA #/AREA URNS HPF: NORMAL /HPF
BILIRUB UR QL STRIP: NEGATIVE
CLUE CELLS: ABNORMAL
COLOR UR AUTO: YELLOW
GLUCOSE UR STRIP-MCNC: 100 MG/DL
HGB UR QL STRIP: NEGATIVE
KETONES UR STRIP-MCNC: NEGATIVE MG/DL
LEUKOCYTE ESTERASE UR QL STRIP: NEGATIVE
NITRATE UR QL: POSITIVE
PH UR STRIP: 6 [PH] (ref 5–8)
RBC #/AREA URNS AUTO: NORMAL /HPF
SP GR UR STRIP: 1.01 (ref 1–1.03)
SQUAMOUS #/AREA URNS AUTO: NORMAL /LPF
TRICHOMONAS, WET PREP: ABNORMAL
UROBILINOGEN UR STRIP-ACNC: 0.2 E.U./DL
WBC #/AREA URNS AUTO: NORMAL /HPF
WBC'S/HIGH POWER FIELD, WET PREP: ABNORMAL
YEAST, WET PREP: ABNORMAL

## 2022-03-21 PROCEDURE — 87086 URINE CULTURE/COLONY COUNT: CPT | Performed by: PHYSICIAN ASSISTANT

## 2022-03-21 PROCEDURE — 99207 PR NON-BILLABLE SERV PER CHARTING: CPT | Performed by: EMERGENCY MEDICINE

## 2022-03-21 PROCEDURE — 87210 SMEAR WET MOUNT SALINE/INK: CPT | Performed by: PHYSICIAN ASSISTANT

## 2022-03-21 PROCEDURE — 81001 URINALYSIS AUTO W/SCOPE: CPT | Performed by: PHYSICIAN ASSISTANT

## 2022-03-21 PROCEDURE — 99213 OFFICE O/P EST LOW 20 MIN: CPT | Performed by: PHYSICIAN ASSISTANT

## 2022-03-21 NOTE — PATIENT INSTRUCTIONS
Dear Sandhya Quinn, because of your complicated symptoms it is best that you be seen in urgent care today.  They can do a test to check you for yeast infection.  Sorry you are having so much problem but let us have you seen and examined again today.        E are sorry you are not feeling well. Based on the responses you provided, it is recommended that you be seen in-person in urgent care so we can better evaluate your symptoms. Please click here to find the nearest urgent care location to you.   You will not be charged for this Visit. Thank you for trusting us with your care.    Dillon Dover MD

## 2022-03-21 NOTE — PROGRESS NOTES
Chief Complaint   Patient presents with     Back Pain     seen yesterday had negative urine taking AZO has increased back pain chills and urinary sx''s today       ASSESSMENT/PLAN:  Sandhya was seen today for back pain.    Diagnoses and all orders for this visit:    Dysuria  -     UA macro with reflex to Microscopic and Culture - Clinc Collect  -     Wet prep - Clinic Collect  -     Urine Microscopic Exam  -     Urine Culture    Acute bilateral low back pain without sciatica    Patient's vaginal irritation seems to be an ongoing issue and recommend she follow-up with OB/GYN for this.  May have some atrophy or other cause of urethritis.  No BV or yeast today.  The low back pain seems more consistent with a musculoskeletal cause.  Did consider and discuss a kidney infection but this seems less likely.  Focus on range of motion, stretching and exercise to improve symptoms.  Can take ibuprofen Tylenol as needed.  Did discuss doing lab work which was deferred for now which is reasonable given her overall well-appearing  Follow-up if any new or worsening symptoms develop      August Benson PA-C      SUBJECTIVE:  Sandhya is a 42 year old female who presents to urgent care with concerns for continued and ongoing vaginal discharge, urethral pain, low back pain.  She was seen yesterday and her UA appeared normal.  She was given AZO for symptomatic relief and has been taking this but has not been significantly improving her symptoms.  Patient has been dealing with some ongoing vaginal discharge and this is followed by OB/GYN.  She also has somewhat chronic irritation in general and some dysuria.  The vaginal irritation dysuria actually seem to be better than usual but is having a little bit more frequency.  She noticed lower back pain about 4 days ago with no acute injury, change in exercise or other behaviors.  She feels she has been chilled over the weekend.  She does have some fibromyalgia and ongoing fatigue and aches  but nothing out of the ordinary.  Mild suprapubic discomfort.  No constipation or diarrhea.  No nausea or vomiting.    ROS: Pertinent ROS neg other than the symptoms noted above in the HPI.     OBJECTIVE:  /82   Pulse 79   Temp 98.1  F (36.7  C) (Oral)   Resp 14   Wt 77.1 kg (170 lb)   LMP 03/04/2022   SpO2 100%   BMI 26.63 kg/m     GENERAL: healthy, alert and no distress  ABDOMEN: soft, no rebound or guarding, mild suprapubic tenderness  MS: no gross musculoskeletal defects noted, no edema  SKIN: no suspicious lesions or rashes  BACK: Diffuse bilateral paralumbar tenderness, CVA tenderness    DIAGNOSTICS    Results for orders placed or performed in visit on 03/21/22   UA macro with reflex to Microscopic and Culture - Clinc Collect     Status: Abnormal    Specimen: Urine, Clean Catch   Result Value Ref Range    Color Urine Yellow Colorless, Straw, Light Yellow, Yellow    Appearance Urine Clear Clear    Glucose Urine 100  (A) Negative mg/dL    Bilirubin Urine Negative Negative    Ketones Urine Negative Negative mg/dL    Specific Gravity Urine 1.010 1.005 - 1.030    Blood Urine Negative Negative    pH Urine 6.0 5.0 - 8.0    Protein Albumin Urine Negative Negative mg/dL    Urobilinogen Urine 0.2 0.2, 1.0 E.U./dL    Nitrite Urine Positive (A) Negative    Leukocyte Esterase Urine Negative Negative   Urine Microscopic Exam     Status: Normal   Result Value Ref Range    Bacteria Urine None Seen None Seen /HPF    RBC Urine None Seen 0-2 /HPF /HPF    WBC Urine None Seen 0-5 /HPF /HPF    Squamous Epithelials Urine None Seen None Seen /LPF   Wet prep - Clinic Collect     Status: Abnormal    Specimen: Vagina; Swab   Result Value Ref Range    Trichomonas Absent Absent    Yeast Absent Absent    Clue Cells Absent Absent    WBCs/high power field 1+ (A) None        Current Outpatient Medications   Medication     acetaminophen (TYLENOL) 500 MG tablet     albuterol (PROAIR HFA/PROVENTIL HFA/VENTOLIN HFA) 108 (90 Base)  MCG/ACT inhaler     beclomethasone HFA (QVAR REDIHALER) 80 MCG/ACT inhaler     busPIRone (BUSPAR) 5 MG tablet     cholecalciferol, vitamin D3, 2,000 unit capsule     cyanocobalamin, vitamin B-12, (VITAMIN B-12) 1,000 mcg tablet     fexofenadine (ALLEGRA) 180 MG tablet     fluticasone propionate (FLONASE) 50 mcg/actuation nasal spray     gabapentin (NEURONTIN) 100 MG capsule     guaiFENesin ER (MUCINEX) 600 mg 12 hr tablet     hydrOXYzine pamoate (VISTARIL) 50 MG capsule     Lactobacillus rhamnosus GG (CULTURELLE) 10-15 Billion cell capsule     montelukast (SINGULAIR) 10 MG tablet     omeprazole (PRILOSEC OTC) 20 MG tablet     sertraline (ZOLOFT) 50 MG tablet     No current facility-administered medications for this visit.      Patient Active Problem List   Diagnosis     TMJ arthralgia     PVC (premature ventricular contraction)     Other allergy, other than to medicinal agents     [place] neuroma, left     Mild intermittent asthma without complication     Migraine headache     Laryngopharyngeal reflux     Generalized anxiety disorder     Fibromyalgia     Major depressive disorder, recurrent episode, in full remission (H)      Past Medical History:   Diagnosis Date     Dyshidrotic eczema 12/2/2016    Left 4th finger     Iron deficiency anemia 12/2/2016    Due to menorrhagia. Treated with endometrial ablation.     Major depressive disorder, recurrent episode (H)      Menorrhagia 12/2/2016    Treated with endometrial ablation.     Past Surgical History:   Procedure Laterality Date     ENDOMETRIAL ABLATION  12/2018     SINUS SURGERY Right 02/01/2006    postoperative nausea + vomiting. Postop pain severe.     TUBAL LIGATION  12/01/2016    with lysis of adhesions. Dr. Iman Condon [facility]     Family History   Problem Relation Age of Onset     Hypertension Mother      Depression Mother      Hyperlipidemia Mother      Other - See Comments Mother         Back problems - spinal stenosis. Multiple surgeries. Bronchiectasis. Lichen  sclerosis     Osteoarthritis Mother      Kidney Disease Mother         Long term Vioxx use     Osteoporosis Mother      Hypertension Father      Hyperlipidemia Father      Arrhythmia Father         Atrial fibrillation, had ablation     Memory loss Father      Osteoarthritis Father      Hypertension Sister      Anxiety Disorder Sister      Depression Sister      Anxiety Disorder Sister      Depression Brother         seasonal     Rheumatoid Arthritis Maternal Grandmother      Osteoporosis Maternal Grandmother      Heart Disease Maternal Grandfather      Other - See Comments Paternal Grandmother 102        Longevity     Kidney Disease Paternal Grandfather      Asthma Son      No Known Problems Son      Endometriosis Maternal Aunt      Breast Cancer Maternal Aunt         In-situ?     Breast Cancer Other      Heart Disease Maternal Uncle 78     Thyroid Cancer Maternal Cousin      Social History     Tobacco Use     Smoking status: Never Smoker     Smokeless tobacco: Never Used   Substance Use Topics     Alcohol use: Yes     Comment: Very occasional              The plan of care was discussed with the patient. They understand and agree with the course of treatment prescribed. A printed summary was given including instructions and medications.  The use of Dragon/Telx dictation services may have been used to construct the content in this note; any grammatical or spelling errors are non-intentional. Please contact the author of this note directly if you are in need of any clarification.

## 2022-03-23 ENCOUNTER — MYC MEDICAL ADVICE (OUTPATIENT)
Dept: FAMILY MEDICINE | Facility: CLINIC | Age: 42
End: 2022-03-23
Payer: COMMERCIAL

## 2022-03-23 LAB — BACTERIA UR CULT: NO GROWTH

## 2022-03-29 NOTE — TELEPHONE ENCOUNTER
Hi Sandhya,    Yes nitrites can be a sign of infection, but the culture came back normal. That could happen if you had a partially treated infection, for example. The vaginal WBCs indicate that you have some vaginal inflammation. Possibly from BV.    How are your symptoms?    Amira Kevin M.D.        Office Visit on 03/21/2022   Component Date Value Ref Range Status     Color Urine 03/21/2022 Yellow  Colorless, Straw, Light Yellow, Yellow Final     Appearance Urine 03/21/2022 Clear  Clear Final     Glucose Urine 03/21/2022 100  (A) Negative mg/dL Final     Bilirubin Urine 03/21/2022 Negative  Negative Final     Ketones Urine 03/21/2022 Negative  Negative mg/dL Final     Specific Gravity Urine 03/21/2022 1.010  1.005 - 1.030 Final     Blood Urine 03/21/2022 Negative  Negative Final     pH Urine 03/21/2022 6.0  5.0 - 8.0 Final     Protein Albumin Urine 03/21/2022 Negative  Negative mg/dL Final     Urobilinogen Urine 03/21/2022 0.2  0.2, 1.0 E.U./dL Final     Nitrite Urine 03/21/2022 Positive (A) Negative Final     Leukocyte Esterase Urine 03/21/2022 Negative  Negative Final     Trichomonas 03/21/2022 Absent  Absent Final     Yeast 03/21/2022 Absent  Absent Final     Clue Cells 03/21/2022 Absent  Absent Final     WBCs/high power field 03/21/2022 1+ (A) None Final     Bacteria Urine 03/21/2022 None Seen  None Seen /HPF Final     RBC Urine 03/21/2022 None Seen  0-2 /HPF /HPF Final     WBC Urine 03/21/2022 None Seen  0-5 /HPF /HPF Final     Squamous Epithelials Urine 03/21/2022 None Seen  None Seen /LPF Final     Culture 03/21/2022 No Growth   Final   Office Visit on 03/20/2022   Component Date Value Ref Range Status     Color Urine 03/20/2022 Yellow  Colorless, Straw, Light Yellow, Yellow Final     Appearance Urine 03/20/2022 Clear  Clear Final     Glucose Urine 03/20/2022 Negative  Negative mg/dL Final     Bilirubin Urine 03/20/2022 Negative  Negative Final     Ketones Urine 03/20/2022 Negative  Negative mg/dL Final      Specific Gravity Urine 03/20/2022 1.010  1.005 - 1.030 Final     Blood Urine 03/20/2022 Negative  Negative Final     pH Urine 03/20/2022 6.5  5.0 - 8.0 Final     Protein Albumin Urine 03/20/2022 Negative  Negative mg/dL Final     Urobilinogen Urine 03/20/2022 0.2  0.2, 1.0 E.U./dL Final     Nitrite Urine 03/20/2022 Negative  Negative Final     Leukocyte Esterase Urine 03/20/2022 Negative  Negative Final

## 2022-04-18 ENCOUNTER — HOSPITAL ENCOUNTER (OUTPATIENT)
Dept: MAMMOGRAPHY | Facility: CLINIC | Age: 42
Discharge: HOME | End: 2022-04-18
Attending: FAMILY MEDICINE | Admitting: FAMILY MEDICINE
Payer: COMMERCIAL

## 2022-04-18 DIAGNOSIS — Z12.31 VISIT FOR SCREENING MAMMOGRAM: ICD-10-CM

## 2022-04-18 PROCEDURE — 77067 SCR MAMMO BI INCL CAD: CPT

## 2023-01-07 ENCOUNTER — HEALTH MAINTENANCE LETTER (OUTPATIENT)
Age: 43
End: 2023-01-07

## 2023-03-23 DIAGNOSIS — F41.1 GENERALIZED ANXIETY DISORDER: ICD-10-CM

## 2023-03-24 NOTE — TELEPHONE ENCOUNTER
"Routing refill request to provider for review/approval because:  Patient needs to be seen because it has been more than 1 year since last office visit.    Last Written Prescription Date:  1/24/22  Last Fill Quantity: 180,  # refills: 3   Last office visit provider:   11/19/21    Requested Prescriptions   Pending Prescriptions Disp Refills     busPIRone (BUSPAR) 5 MG tablet [Pharmacy Med Name: BUSPIRONE HCL 5MG TABS] 180 tablet 3     Sig: TAKE ONE TABLET BY MOUTH TWICE A DAY       Atypical Antidepressants Protocol Failed - 3/24/2023  2:54 PM        Failed - Recent (12 mo) or future (30 days) visit within the authorizing provider's specialty     Patient has had an office visit with the authorizing provider or a provider within the authorizing providers department within the previous 12 mos or has a future within next 30 days. See \"Patient Info\" tab in inbasket, or \"Choose Columns\" in Meds & Orders section of the refill encounter.              Passed - Medication active on med list        Passed - Patient is age 18 or older        Passed - No active pregnancy on record        Passed - No positive pregnancy test in past 12 mos             INDIA ARMSTRONG RN 03/24/23 2:54 PM  "

## 2023-03-27 RX ORDER — BUSPIRONE HYDROCHLORIDE 5 MG/1
TABLET ORAL
Qty: 180 TABLET | Refills: 3 | Status: SHIPPED | OUTPATIENT
Start: 2023-03-27

## 2023-04-06 ENCOUNTER — TELEPHONE (OUTPATIENT)
Dept: FAMILY MEDICINE | Facility: CLINIC | Age: 43
End: 2023-04-06
Payer: COMMERCIAL

## 2023-04-06 DIAGNOSIS — J45.20 MILD INTERMITTENT ASTHMA WITHOUT COMPLICATION: Primary | ICD-10-CM

## 2023-04-06 NOTE — TELEPHONE ENCOUNTER
[facility]    Fax:[phone]    Spec: [phone]    MO: [phone]

## 2023-04-11 NOTE — TELEPHONE ENCOUNTER
PA Initiation    Medication: QVAR Redihaler PA INITIATED  Insurance Company: [employer] - Phone [phone] Fax [phone]  Pharmacy Filling the Rx: [facility] - [place], [place] - [facility] [address]  Filling Pharmacy Phone: [phone]  Filling Pharmacy Fax:    Start Date: 4/11/2023    Central Prior Authorization Team   Phone: [phone]

## 2023-04-17 NOTE — TELEPHONE ENCOUNTER
Please let Sandhya know that QVAR was denied by her insurance. I sent in a prescription for Arnuity Ellipta once a day, instead.

## 2023-04-17 NOTE — TELEPHONE ENCOUNTER
PRIOR AUTHORIZATION DENIED    Medication: QVAR Redihaler PA DENIED    Denial Date: 4/16/2023    Denial Rational:       Appeal Information:

## 2023-04-17 NOTE — TELEPHONE ENCOUNTER
Patient returned call back, I relayed message below. Patient understood information. No further questions or concerns. But the script was sent to the wrong pharmacy. Patient would like it sent to [facility] - [place], [place] - [address]

## 2023-09-10 ASSESSMENT — ENCOUNTER SYMPTOMS
DIZZINESS: 0
HEADACHES: 1
WEAKNESS: 0
ABDOMINAL PAIN: 0
DYSURIA: 0
HEMATOCHEZIA: 0
PALPITATIONS: 0
CONSTIPATION: 0
JOINT SWELLING: 0
HEMATURIA: 0
COUGH: 0
FREQUENCY: 0
NERVOUS/ANXIOUS: 0
HEARTBURN: 0
EYE PAIN: 0
FEVER: 0
SORE THROAT: 0
PARESTHESIAS: 1
NAUSEA: 0
CHILLS: 0
SHORTNESS OF BREATH: 0
ARTHRALGIAS: 1
BREAST MASS: 0
MYALGIAS: 0
DIARRHEA: 0

## 2023-09-14 ASSESSMENT — PATIENT HEALTH QUESTIONNAIRE - PHQ9
10. IF YOU CHECKED OFF ANY PROBLEMS, HOW DIFFICULT HAVE THESE PROBLEMS MADE IT FOR YOU TO DO YOUR WORK, TAKE CARE OF THINGS AT HOME, OR GET ALONG WITH OTHER PEOPLE: SOMEWHAT DIFFICULT
SUM OF ALL RESPONSES TO PHQ QUESTIONS 1-9: 2
SUM OF ALL RESPONSES TO PHQ QUESTIONS 1-9: 2

## 2023-09-15 ENCOUNTER — OFFICE VISIT (OUTPATIENT)
Dept: FAMILY MEDICINE | Facility: CLINIC | Age: 43
End: 2023-09-15
Payer: COMMERCIAL

## 2023-09-15 ENCOUNTER — HOSPITAL ENCOUNTER (OUTPATIENT)
Dept: MAMMOGRAPHY | Facility: CLINIC | Age: 43
Discharge: HOME | End: 2023-09-15
Attending: FAMILY MEDICINE | Admitting: FAMILY MEDICINE
Payer: COMMERCIAL

## 2023-09-15 VITALS
OXYGEN SATURATION: 100 % | SYSTOLIC BLOOD PRESSURE: 130 MMHG | DIASTOLIC BLOOD PRESSURE: 78 MMHG | RESPIRATION RATE: 16 BRPM | BODY MASS INDEX: 28.93 KG/M2 | HEART RATE: 84 BPM | WEIGHT: 180 LBS | HEIGHT: 66 IN | TEMPERATURE: 97.1 F

## 2023-09-15 DIAGNOSIS — F33.42 MAJOR DEPRESSIVE DISORDER, RECURRENT EPISODE, IN FULL REMISSION (H): ICD-10-CM

## 2023-09-15 DIAGNOSIS — Z11.59 NEED FOR HEPATITIS C SCREENING TEST: ICD-10-CM

## 2023-09-15 DIAGNOSIS — Z12.31 VISIT FOR SCREENING MAMMOGRAM: ICD-10-CM

## 2023-09-15 DIAGNOSIS — R20.0 NUMBNESS OF TOES: ICD-10-CM

## 2023-09-15 DIAGNOSIS — J45.20 MILD INTERMITTENT ASTHMA WITHOUT COMPLICATION: ICD-10-CM

## 2023-09-15 DIAGNOSIS — Z00.00 ROUTINE GENERAL MEDICAL EXAMINATION AT A HEALTH CARE FACILITY: Primary | ICD-10-CM

## 2023-09-15 DIAGNOSIS — G57.13 MERALGIA PARESTHETICA, BILATERAL LOWER LIMBS: ICD-10-CM

## 2023-09-15 PROCEDURE — 90471 IMMUNIZATION ADMIN: CPT | Performed by: FAMILY MEDICINE

## 2023-09-15 PROCEDURE — 99396 PREV VISIT EST AGE 40-64: CPT | Mod: 25 | Performed by: FAMILY MEDICINE

## 2023-09-15 PROCEDURE — 77067 SCR MAMMO BI INCL CAD: CPT

## 2023-09-15 PROCEDURE — 90677 PCV20 VACCINE IM: CPT | Performed by: FAMILY MEDICINE

## 2023-09-15 PROCEDURE — 90686 IIV4 VACC NO PRSV 0.5 ML IM: CPT | Performed by: FAMILY MEDICINE

## 2023-09-15 PROCEDURE — 90472 IMMUNIZATION ADMIN EACH ADD: CPT | Performed by: FAMILY MEDICINE

## 2023-09-15 RX ORDER — GABAPENTIN 100 MG/1
100 CAPSULE ORAL 3 TIMES DAILY PRN
Qty: 30 CAPSULE | Refills: 2 | Status: SHIPPED | OUTPATIENT
Start: 2023-09-15

## 2023-09-15 RX ORDER — ALBUTEROL SULFATE 90 UG/1
2 AEROSOL, METERED RESPIRATORY (INHALATION) EVERY 4 HOURS PRN
Qty: 18 G | Refills: 3 | Status: SHIPPED | OUTPATIENT
Start: 2023-09-15

## 2023-09-15 ASSESSMENT — ASTHMA QUESTIONNAIRES: ACT_TOTALSCORE: 23

## 2023-09-15 ASSESSMENT — ENCOUNTER SYMPTOMS
CHILLS: 0
ARTHRALGIAS: 1
DIARRHEA: 0
PALPITATIONS: 0
HEADACHES: 1
FEVER: 0
NERVOUS/ANXIOUS: 0
FREQUENCY: 0
EYE PAIN: 0
DYSURIA: 0
JOINT SWELLING: 0
PARESTHESIAS: 1
HEMATURIA: 0
COUGH: 0
ABDOMINAL PAIN: 0
WEAKNESS: 0
HEMATOCHEZIA: 0
BREAST MASS: 0
DIZZINESS: 0
NAUSEA: 0
SORE THROAT: 0
HEARTBURN: 0
MYALGIAS: 0
SHORTNESS OF BREATH: 0
CONSTIPATION: 0

## 2023-09-15 ASSESSMENT — PATIENT HEALTH QUESTIONNAIRE - PHQ9: SUM OF ALL RESPONSES TO PHQ QUESTIONS 1-9: 2

## 2023-09-15 NOTE — PROGRESS NOTES
SUBJECTIVE:   CC: Sandhya is an 43 year old who presents for preventive health visit.       9/15/2023     9:06 AM   Additional Questions   Roomed by reji       Healthy Habits:     Getting at least 3 servings of Calcium per day:  Yes    Bi-annual eye exam:  Yes    Dental care twice a year:  Yes    Sleep apnea or symptoms of sleep apnea:  None    Diet:  Regular (no restrictions)    Frequency of exercise:  2-3 days/week    Duration of exercise:  15-30 minutes    Taking medications regularly:  Yes    Medication side effects:  None    Additional concerns today:  No      Today's PHQ-9 Score:       9/15/2023     9:12 AM   PHQ-9 SCORE   PHQ-9 Total Score 2       Social History     Tobacco Use    Smoking status: Never     Passive exposure: Never    Smokeless tobacco: Never   Substance Use Topics    Alcohol use: Not Currently     Comment: Very occasional             9/10/2023    10:02 AM   Alcohol Use   Prescreen: >3 drinks/day or >7 drinks/week? Not Applicable     Reviewed orders with patient.  Reviewed health maintenance and updated orders accordingly - Yes      Breast Cancer Screening:    FHS-7:       11/16/2021     9:37 AM 9/10/2023    10:05 AM 9/15/2023     9:12 AM   Breast CA Risk Assessment (FHS-7)   Did any of your first-degree relatives have breast or ovarian cancer? No No No   Did any of your relatives have bilateral breast cancer? No No No   Did any man in your family have breast cancer? No No No   Did any woman in your family have breast and ovarian cancer? No No No   Did any woman in your family have breast cancer before age 50 y? No No No   Do you have 2 or more relatives with breast and/or ovarian cancer? No Yes Yes   Do you have 2 or more relatives with breast and/or bowel cancer? No No No     Pertinent mammograms are reviewed under the imaging tab.    History of abnormal Pap smear:       Latest Ref Rng & Units 9/24/2020    12:00 PM 5/10/2019    11:12 AM 12/1/2017     9:51 AM   PAP / HPV   PAP Negative  "for squamous intraepithelial lesion or malignancy. Negative for squamous intraepithelial lesion or malignancy  Electronically signed by Claudio Diane CT (ASCP) on 10/5/2020 at  1:02 PM    Negative for squamous intraepithelial lesion or malignancy  Electronically signed by Claudio Diane CT (ASCP) on 5/20/2019 at  2:34 PM    Negative for squamous intraepithelial lesion or malignancy  Electronically signed by Beverly Meng CT (ASCP) on 12/9/2017 at  6:09 PM      HPV 16 DNA NEG Negative  Negative     HPV 18 DNA NEG Negative  Negative     Other HR HPV NEG Negative  Negative       Reviewed and updated as needed this visit by clinical staff   Tobacco  Allergies  Meds              Reviewed and updated as needed this visit by Provider                     Review of Systems   Constitutional:  Negative for chills and fever.   HENT:  Negative for congestion, ear pain, hearing loss and sore throat.    Eyes:  Negative for pain and visual disturbance.   Respiratory:  Negative for cough and shortness of breath.    Cardiovascular:  Negative for chest pain, palpitations and peripheral edema.   Gastrointestinal:  Negative for abdominal pain, constipation, diarrhea, heartburn, hematochezia and nausea.   Breasts:  Negative for tenderness, breast mass and discharge.   Genitourinary:  Negative for dysuria, frequency, genital sores, hematuria, pelvic pain, urgency, vaginal bleeding and vaginal discharge.   Musculoskeletal:  Positive for arthralgias. Negative for joint swelling and myalgias.   Skin:  Negative for rash.   Neurological:  Positive for headaches and paresthesias. Negative for dizziness and weakness.   Psychiatric/Behavioral:  Negative for mood changes. The patient is not nervous/anxious.         OBJECTIVE:   /78 (BP Location: Left arm, Patient Position: Sitting, Cuff Size: Adult Regular)   Pulse 84   Temp 97.1  F (36.2  C) (Temporal)   Resp 16   Ht 1.68 m (5' 6.14\")   Wt 81.6 kg (180 lb)   " "SpO2 100%   BMI 28.93 kg/m    Physical Exam  GENERAL: healthy, alert and no distress  EYES: Eyes grossly normal to inspection, PERRL and conjunctivae and sclerae normal  HENT: ear canals and TM's normal, nose and mouth without ulcers or lesions  NECK: no adenopathy, no asymmetry, masses, or scars and thyroid normal to palpation  RESP: lungs clear to auscultation - no rales, rhonchi or wheezes  BREAST: normal without masses, tenderness or nipple discharge and no palpable axillary masses or adenopathy  CV: regular rate and rhythm, normal S1 S2, no S3 or S4, no murmur, click or rub, no peripheral edema and peripheral pulses strong  ABDOMEN: soft, nontender, no hepatosplenomegaly, no masses and bowel sounds normal  MS: no gross musculoskeletal defects noted, no edema  SKIN: no suspicious lesions or rashes  NEURO: Normal strength and tone, mentation intact and speech normal  PSYCH: mentation appears normal, affect normal/bright    Diagnostic Test Results:  Labs reviewed in Epic    ASSESSMENT/PLAN:       ICD-10-CM    1. Routine general medical examination at a health care facility  Z00.00       2. Need for hepatitis C screening test  Z11.59 Hepatitis C Screen Reflex to HCV RNA Quant and Genotype      3. Major depressive disorder, recurrent episode, in full remission (H)  F33.42       4. Visit for screening mammogram  Z12.31       5. Mild intermittent asthma without complication  J45.20 albuterol (PROAIR HFA/PROVENTIL HFA/VENTOLIN HFA) 108 (90 Base) MCG/ACT inhaler      6. Meralgia paresthetica, bilateral lower limbs  G57.13 gabapentin (NEURONTIN) 100 MG capsule      7. Numbness of toes  R20.0 Orthopedic  Referral              COUNSELING:  Reviewed preventive health counseling, as reflected in patient instructions      BMI:   Estimated body mass index is 28.93 kg/m  as calculated from the following:    Height as of this encounter: 1.68 m (5' 6.14\").    Weight as of this encounter: 81.6 kg (180 lb).   Weight " management plan: Discussed healthy diet and exercise guidelines      She reports that she has never smoked. She has never been exposed to tobacco smoke. She has never used smokeless tobacco.          Amira Kevin MD  [facility]  Prior to immunization administration, verified patients identity using patient s name and date of birth. Please see Immunization Activity for additional information.     Screening Questionnaire for Adult Immunization    Are you sick today?   No   Do you have allergies to medications, food, a vaccine component or latex?   No   Have you ever had a serious reaction after receiving a vaccination?   No   Do you have a long-term health problem with heart, lung, kidney, or metabolic disease (e.g., diabetes), asthma, a blood disorder, no spleen, complement component deficiency, a cochlear implant, or a spinal fluid leak?  Are you on long-term aspirin therapy?   Yes   Do you have cancer, leukemia, HIV/AIDS, or any other immune system problem?   No   Do you have a parent, brother, or sister with an immune system problem?   No   In the past 3 months, have you taken medications that affect  your immune system, such as prednisone, other steroids, or anticancer drugs; drugs for the treatment of rheumatoid arthritis, Crohn s disease, or psoriasis; or have you had radiation treatments?   No   Have you had a seizure, or a brain or other nervous system problem?   No   During the past year, have you received a transfusion of blood or blood    products, or been given immune (gamma) globulin or antiviral drug?   No   For women: Are you pregnant or is there a chance you could become       pregnant during the next month?   No   Have you received any vaccinations in the past 4 weeks?   No     Immunization questionnaire answers were all negative.      Patient instructed to remain in clinic for 15 minutes afterwards, and to report any adverse reactions.     Screening performed by Minna  GUY Garcia on 9/15/2023 at 9:16 AM.     Answers submitted by the patient for this visit:  Patient Health Questionnaire (Submitted on 9/14/2023)  If you checked off any problems, how difficult have these problems made it for you to do your work, take care of things at home, or get along with other people?: Somewhat difficult  PHQ9 TOTAL SCORE: 2

## 2023-10-03 ENCOUNTER — OFFICE VISIT (OUTPATIENT)
Dept: PODIATRY | Facility: CLINIC | Age: 43
End: 2023-10-03
Payer: COMMERCIAL

## 2023-10-03 VITALS
HEART RATE: 79 BPM | SYSTOLIC BLOOD PRESSURE: 126 MMHG | RESPIRATION RATE: 18 BRPM | DIASTOLIC BLOOD PRESSURE: 81 MMHG | OXYGEN SATURATION: 99 %

## 2023-10-03 DIAGNOSIS — M21.6X2 PRONATION DEFORMITY OF BOTH FEET: ICD-10-CM

## 2023-10-03 DIAGNOSIS — M20.5X2 HALLUX LIMITUS, LEFT: Primary | ICD-10-CM

## 2023-10-03 DIAGNOSIS — M21.6X1 PRONATION DEFORMITY OF BOTH FEET: ICD-10-CM

## 2023-10-03 PROCEDURE — 99203 OFFICE O/P NEW LOW 30 MIN: CPT | Performed by: PODIATRIST

## 2023-10-03 ASSESSMENT — PAIN SCALES - GENERAL: PAINLEVEL: MILD PAIN (3)

## 2023-10-03 NOTE — PATIENT INSTRUCTIONS
What are Prescription Custom Orthotics?  Custom orthotics are specially-made devices designed to support and comfort your feet. Prescription orthotics are crafted for you and no one else. They match the contours of your feet precisely and are designed for the way you move. Orthotics are only manufactured after a podiatrist has conducted a complete evaluation of your feet, ankles, and legs, so the orthotic can accommodate your unique foot structure and pathology.  Prescription orthotics are divided into two categories:  Functional orthotics are designed to control abnormal motion. They may be used to treat foot pain caused by abnormal motion; they can also be used to treat injuries such as shin splints or tendinitis. Functional orthotics are usually crafted of a semi-rigid material such as plastic or graphite.  Accommodative orthotics are softer and meant to provide additional cushioning and support. They can be used to treat diabetic foot ulcers, painful calluses on the bottom of the foot, and other uncomfortable conditions.  Podiatrists use orthotics to treat foot problems such as plantar fasciitis, bursitis, tendinitis, diabetic foot ulcers, and foot, ankle, and heel pain. Clinical research studies have shown that podiatrist-prescribed foot orthotics decrease foot pain and improve function.  Orthotics typically cost more than shoe inserts purchased in a retail store, but the additional cost is usually well worth it. Unlike shoe inserts, orthotics are molded to fit each individual foot, so you can be sure that your orthotics fit and do what they're supposed to do. Prescription orthotics are also made of top-notch materials and last many years when cared for properly. Insurance often helps pay for prescription orthotics.  What are Shoe Inserts?   You've seen them at the grocery store and at the mall. You've probably even seen them on TV and online. Shoe inserts are any kind of non-prescription foot support designed  to be worn inside a shoe. Pre-packaged, mass produced, arch supports are shoe inserts. So are the  custom-made  insoles and foot supports that you can order online or at retail stores. Unless the device has been prescribed by a doctor and crafted for your specific foot, it's a shoe insert, not a custom orthotic device--despite what the ads might say.  Shoe inserts can be very helpful for a variety of foot ailments, including flat arches and foot and leg pain. They can cushion your feet, provide comfort, and support your arches, but they can't correct biomechanical foot problems or cure long-standing foot issues.  The most common types of shoe inserts are:  Arch supports: Some people have high arches. Others have low arches or flat feet. Arch supports generally have a  bumped-up  appearance and are designed to support the foot's natural arch.   Insoles: Insoles slip into your shoe to provide extra cushioning and support. Insoles are often made of gel, foam, or plastic.   Heel liners: Heel liners, sometimes called heel pads or heel cups, provide extra cushioning in the heel region. They may be especially useful for patients who have foot pain caused by age-related thinning of the heels' natural fat pads.   Foot cushions: Do your shoes rub against your heel or your toes? Foot cushions come in many different shapes and sizes and can be used as a barrier between you and your shoe.  Choosing an Over-the-Counter Shoe Insert  Selecting a shoe insert from the wide variety of devices on the market can be overwhelming. Here are some podiatrist-tested tips to help you find the insert that best meets your needs:  Consider your health. Do you have diabetes? Problems with circulation? An over-the-counter insert may not be your best bet. Diabetes and poor circulation increase your risk of foot ulcers and infections, so schedule an appointment with a podiatrist. He or she can help you select a solution that won't cause additional  health problems.   Think about the purpose. Are you planning to run a marathon, or do you just need a little arch support in your work shoes? Look for a product that fits your planned level of activity.   Bring your shoes. For the insert to be effective, it has to fit into your shoes. So bring your sneakers, dress shoes, or work boots--whatever you plan to wear with your insert. Look for an insert that will fit the contours of your shoe.   Try them on. If all possible, slip the insert into your shoe and try it out. Walk around a little. How does it feel? Don't assume that feelings of pressure will go away with continued wear. (If you can't try the inserts at the store, ask about the store's return policy and hold on to your receipt.)    Please call one of the [place] locations below to schedule an appointment. If you received a prescription please bring it with you to your appointment. Some locations are limited to what they carry.    Office Locations    [facility]  [address] [place] [address]  Home Medical Equipment, [address]   Phone: [phone]   Orthotics and Prosthetics, [address]   Phone: [phone]    [facility]   Home Medical Equipment   [address] [address]  Phone :[phone]  Orthotics and Prosthetics   [address], [address], [facility] [address]  Phone:[phone]          [facility]  [address]   [place] [address]   Phone: [phone]    [facility] Professional Bldg.  [address]  [place] [address]  Phone: [phone]    [facility] Medical Bldg[facility]   [address]  [place] [address]  Phone: [phone]    [facility]  [address]  [place] [address]  Phone:  [phone]    [facility]  [address]  [place] [address]  Phone: [phone]    [place]   [address] [place] [address]   Phone: [phone]    WEARING YOUR CUSTOM FOOT ORTHOTICS   Most insurance plans cover one pair of orthotics per year. You must check with your   insurance plan to see what your payment responsibility will be. Please call your   insurance company by calling the number on the back of your insurance card.   Orthotic's are non-refundable and non-returnable.   Orthotics are made of various designs. Some orthotics are covered with material that extends beyond your toes. If your orthotic is of this design, you will likely need to trim the toe end to get a proper fit. The insole from your shoe can be used as a template. Simply overlay the shoe insert on top of the custom orthotic. Align the heel end while tracing the length of the insert onto the custom orthotic. Use a large scissor to trim the toe end until you get a proper fit in the shoe.   The orthotic needs to be pushed as far back in the shoe as possible. The heel portion should not ride forward so as not to irritate your heel.   Orthotics are designed to work with socks. Excessive perspiration will shorten the life span of the orthotics. Remove the orthotic from the shoe frequently for proper drying.   The break-in period lasts for weeks. People new to orthotics will likely experience new aches and pains. The orthotic is forcing your foot into a new position. Arch, foot and leg muscle aches and fatigue are common during these weeks. Minor discomfort can be considered normal break in phenomenon. Start wearing your orthotic around your home your first day. Limited activity for one to two hours is recommended. You can increase one or two additional hours each day provided the aches and pains are subsiding. The degree of discomfort, fatigue and problems will dictate the speed of break  in. You may require multiple weeks to work up to full time use.   Do not continue wearing your orthotics if they are creating problems such as blisters or sores. Do not hesitate to call the clinic to speak with a nurse regarding orthotic   break in, fit, trimming, etc. You may also need to see the doctor if the orthotics are   simply not working out. Adjustments are sometimes made to improve orthotic   function.     Orthotics will only work in certain styles and types of shoes. Orthotics rarely work in dress shoes. Slip-ons, clogs, sandals and heels are particularly troublesome. Specially designed orthotics may be necessary for these types of shoes. Your custom orthotic was designed for activities that require appropriate walking or running shoes. Lace up athletic shoes, walking shoes or work boots should work appropriately. You may need a wider or longer shoe. Shoes with a removable  or insert work best. In general, you want to remove an insert from the shoe before placing the orthotic into the shoe. Shoes without a removable liner may not work as well.     When purchasing new shoes, bring your orthotics along to get a proper fit. Shop at stores that are familiar with orthotics.   Frequent washing of the orthotic may shorten the life span of the top cover. The top cover can be replaced but will generally last one to five years depending on use and foot perspiration.

## 2023-10-03 NOTE — Clinical Note
"    10/3/2023         RE: Sandhya Caal  [address] [facility] [address]        Dear Colleague,    Thank you for referring your patient, Sandhya Caal, to the [facility]. Please see a copy of my visit note below.    FOOT AND ANKLE SURGERY/PODIATRY CONSULT NOTE         ASSESSMENT:   Paniagua's neuroma left foot      TREATMENT:  ***        HPI: I was asked to see Sandhya Caal today  ***.  The patient was seen in consultation at the request of Amira Kevin MD for evaluation and treatment of numbness of the toes of the left foot.     {PAST MEDICAL HISTORY:[id number]::\"***\"}    {SOCIAL HISTORY:[id number]::\"***\"}       Allergies   Allergen Reactions     Morphine Unknown     Theophylline Dizziness and Palpitations     Annotation: Likely a toxicity issue            Current Outpatient Medications:      acetaminophen (TYLENOL) 500 MG tablet, [ACETAMINOPHEN (TYLENOL) 500 MG TABLET] Take 1,000 mg by mouth every 6 (six) hours as needed for pain., Disp: , Rfl:      albuterol (PROAIR HFA/PROVENTIL HFA/VENTOLIN HFA) 108 (90 Base) MCG/ACT inhaler, Inhale 2 puffs into the lungs every 4 hours as needed for wheezing, Disp: 18 g, Rfl: 3     busPIRone (BUSPAR) 5 MG tablet, TAKE ONE TABLET BY MOUTH TWICE A DAY, Disp: 180 tablet, Rfl: 3     cholecalciferol, vitamin D3, 2,000 unit capsule, [CHOLECALCIFEROL, VITAMIN D3, 2,000 UNIT CAPSULE] Take 2,000 Units by mouth daily. , Disp: , Rfl:      cyanocobalamin, vitamin B-12, (VITAMIN B-12) 1,000 mcg tablet, [CYANOCOBALAMIN, VITAMIN B-12, (VITAMIN B-12) 1,000 MCG TABLET] Take 1,000 mcg by mouth daily as needed., Disp: , Rfl:      fexofenadine (ALLEGRA) 180 MG tablet, Take 1 tablet (180 mg) by mouth daily, Disp: 90 tablet, Rfl: 4     fluticasone (ARNUITY ELLIPTA) 200 MCG/ACT inhaler, Inhale 1 puff into the lungs daily, Disp: 90 each, Rfl: 4     fluticasone propionate (FLONASE) 50 mcg/actuation nasal spray, [FLUTICASONE PROPIONATE (FLONASE) 50 MCG/ACTUATION " NASAL SPRAY] INSTILL TWO SPRAYS INTO EACH NOSTRIL ONCE DAILY, Disp: 16 g, Rfl: 3     gabapentin (NEURONTIN) 100 MG capsule, Take 1 capsule (100 mg) by mouth 3 times daily as needed for neuropathic pain, Disp: 30 capsule, Rfl: 2     montelukast (SINGULAIR) 10 MG tablet, Take 1 tablet (10 mg) by mouth At Bedtime, Disp: 90 tablet, Rfl: 4     sertraline (ZOLOFT) 50 MG tablet, Take 3 tablets (150 mg) by mouth daily, Disp: 270 tablet, Rfl: 4     guaiFENesin ER (MUCINEX) 600 mg 12 hr tablet, [GUAIFENESIN ER (MUCINEX) 600 MG 12 HR TABLET] Take 1,200 mg by mouth 2 (two) times a day as needed.  (Patient not taking: Reported on 9/15/2023), Disp: , Rfl:      hydrOXYzine pamoate (VISTARIL) 50 MG capsule, [HYDROXYZINE PAMOATE (VISTARIL) 50 MG CAPSULE] TAKE 1 CAPSULE (50 MG TOTAL) BY MOUTH AT BEDTIME AS NEEDED FOR ANXIETY (AND INSOMNIA). (Patient not taking: Reported on 9/15/2023), Disp: 50 capsule, Rfl: 1     Lactobacillus rhamnosus GG (CULTURELLE) 10-15 Billion cell capsule, [LACTOBACILLUS RHAMNOSUS GG (CULTURELLE) 10-15 BILLION CELL CAPSULE] Take 1 capsule by mouth daily. (Patient not taking: Reported on 9/15/2023), Disp: , Rfl:      omeprazole (PRILOSEC OTC) 20 MG tablet, [OMEPRAZOLE (PRILOSEC OTC) 20 MG TABLET]  (Patient not taking: Reported on 9/15/2023), Disp: , Rfl:      Family History   Problem Relation Age of Onset     Hypertension Mother      Depression Mother      Hyperlipidemia Mother      Other - See Comments Mother         Back problems - spinal stenosis. Multiple surgeries. Bronchiectasis. Lichen sclerosis     Osteoarthritis Mother      Kidney Disease Mother         Long term Vioxx use     Osteoporosis Mother      Hypertension Father      Hyperlipidemia Father      Arrhythmia Father         Atrial fibrillation, had ablation     Dementia Father         Vascular dementia     Osteoarthritis Father      Hypertension Sister      Anxiety Disorder Sister      Depression Sister      Diabetes Sister         Diagnosed around  2021     Obesity Sister      Anxiety Disorder Sister      Depression Brother         seasonal     Rheumatoid Arthritis Maternal Grandmother      Osteoporosis Maternal Grandmother      Heart Disease Maternal Grandfather      Coronary Artery Disease Maternal Grandfather      Other - See Comments Paternal Grandmother 102        Longevity     Kidney Disease Paternal Grandfather      Asthma Son      No Known Problems Son      Breast Cancer Maternal Aunt 73     Endometriosis Maternal Aunt      Breast Cancer Maternal Aunt 78        In-situ?     Heart Disease Maternal Uncle 78     Thyroid Cancer Maternal Cousin      Breast Cancer Other         Maternal great aunt     Prostate Cancer Paternal Uncle         Uncle     Prostate Cancer Paternal Uncle         Uncle     Prostate Cancer Paternal Uncle         Uncle     Parkinsonism Paternal Uncle         Social History     Socioeconomic History     Marital status:      Spouse name: Newton Moore     Number of children: 2     Years of education: Bachelors degree     Highest education level: Not on file   Occupational History     Occupation:    Tobacco Use     Smoking status: Never     Passive exposure: Never     Smokeless tobacco: Never   Vaping Use     Vaping Use: Never used   Substance and Sexual Activity     Alcohol use: Not Currently     Comment: Very occasional     Drug use: Yes     Types: Marijuana     Comment: Occasional THC beverages     Sexual activity: Yes     Partners: Male     Birth control/protection: Female Surgical     Comment: Tubal ligation 2016   Other Topics Concern     Parent/sibling w/ CABG, MI or angioplasty before 65F 55M? No   Social History Narrative    : Newton Caal  Kids: Luiz 2012, Leroy 2014.      Diet: Increasing protein, eats fruits and veggies.  Calcium + D - supplements.    Exercise: yoga, running, weights. Skiing.     Social Determinants of Health     Financial Resource Strain: Not on file   Food Insecurity: Not on file  "  Transportation Needs: Not on file   Physical Activity: Not on file   Stress: Not on file   Social Connections: Not on file   Interpersonal Safety: Not on file   Housing Stability: Not on file        Review of Systems - Patient denies fever, chills, rash, wound, stiffness, limping, numbness, weakness, heart burn, blood in stool, chest pain with activity, calf pain when walking, shortness of breath with activity, chronic cough, easy bleeding/bruising, swelling of ankles, excessive thirst, fatigue, depression, anxiety.  Patient admits to ***.      OBJECTIVE:  Appearance: {appearance:269081::\"alert, well appearing, and in no distress\"}.    There were no vitals taken for this visit.     There is no height or weight on file to calculate BMI.     General appearance: Patient is alert and fully cooperative with history & exam.  No sign of distress is noted during the visit.  Psychiatric: Affect is pleasant & appropriate.  Patient appears motivated to improve health.  Respiratory: Breathing is regular & unlabored while sitting.  HEENT: Hearing is intact to spoken word.  Speech is clear.  No gross evidence of visual impairment that would impact ambulation.    Vascular: Dorsalis pedis and posterior tibial pulses are palpable. There is pedal hair growth ***.  CFT < 3 sec from anterior tibial surface to distal digits ***. There is no appreciable edema noted.  Dermatologic: Turgor and texture are within normal limits. No coloration or temperature changes. No primary or secondary lesions noted.  Neurologic: All epicritic and proprioceptive sensations are grossly intact ***.  Musculoskeletal: All active and passive ankle, subtalar, midtarsal, and 1st MPJ range of motion are grossly intact without pain or crepitus, with the exception of ***. Manual muscle strength is ***. All dorsiflexors, plantarflexors, invertors, evertors are intact ***. Tenderness present to *** on palpation. Tenderness to *** with range of motion. Calf is " soft/non-tender with/without warmth/induration    Imaging:     {Imaging order/result:19753}  No images are attached to the encounter or orders placed in the encounter.     MA Screening Bilateral w/ Doron    Result Date: 9/18/2023  BILATERAL FULL FIELD DIGITAL SCREENING MAMMOGRAM WITH TOMOSYNTHESIS Performed on: 9/15/23 Compared to: 04/18/2022 and 02/23/2021 Technique:  This study was evaluated with the assistance of Computer-Aided Detection.  Breast Tomosynthesis was used in interpretation. Findings: The breasts are heterogeneously dense, which may obscure small masses.  There is no radiographic evidence of malignancy.     IMPRESSION: ACR BI-RADS Category 1: Negative RECOMMENDED FOLLOW-UP: Annual routine screening mammogram The results and recommendations of this examination will be communicated to the patient. Mckinley Salazar      MA Screening Bilateral w/ Doron    Result Date: 9/18/2023  BILATERAL FULL FIELD DIGITAL SCREENING MAMMOGRAM WITH TOMOSYNTHESIS Performed on: 9/15/23 Compared to: 04/18/2022 and 02/23/2021 Technique:  This study was evaluated with the assistance of Computer-Aided Detection.  Breast Tomosynthesis was used in interpretation. Findings: The breasts are heterogeneously dense, which may obscure small masses.  There is no radiographic evidence of malignancy.     IMPRESSION: ACR BI-RADS Category 1: Negative RECOMMENDED FOLLOW-UP: Annual routine screening mammogram The results and recommendations of this examination will be communicated to the patient. Mckinley Salazar          TREATMENT:  ***    Mk De La Cruz DPM  [facility]         Again, thank you for allowing me to participate in the care of your patient.        Sincerely,        Mk Rios DPM

## 2023-11-02 ENCOUNTER — ALLIED HEALTH/NURSE VISIT (OUTPATIENT)
Dept: FAMILY MEDICINE | Facility: CLINIC | Age: 43
End: 2023-11-02
Payer: COMMERCIAL

## 2023-11-02 DIAGNOSIS — Z23 NEED FOR COVID-19 VACCINE: Primary | ICD-10-CM

## 2023-11-02 PROCEDURE — 90480 ADMN SARSCOV2 VAC 1/ONLY CMP: CPT

## 2023-11-02 PROCEDURE — 91320 SARSCV2 VAC 30MCG TRS-SUC IM: CPT

## 2023-11-02 PROCEDURE — 99207 PR NO CHARGE NURSE ONLY: CPT

## 2023-11-02 NOTE — PROGRESS NOTES
Prior to immunization administration, verified patients identity using patient s name and date of birth. Please see Immunization Activity for additional information.     Screening Questionnaire for Adult Immunization    Are you sick today?   No   Do you have allergies to medications, food, a vaccine component or latex?   No   Have you ever had a serious reaction after receiving a vaccination?   No   Do you have a long-term health problem with heart, lung, kidney, or metabolic disease (e.g., diabetes), asthma, a blood disorder, no spleen, complement component deficiency, a cochlear implant, or a spinal fluid leak?  Are you on long-term aspirin therapy?   Yes   Do you have cancer, leukemia, HIV/AIDS, or any other immune system problem?   No   Do you have a parent, brother, or sister with an immune system problem?   No   In the past 3 months, have you taken medications that affect  your immune system, such as prednisone, other steroids, or anticancer drugs; drugs for the treatment of rheumatoid arthritis, Crohn s disease, or psoriasis; or have you had radiation treatments?   No   Have you had a seizure, or a brain or other nervous system problem?   No   During the past year, have you received a transfusion of blood or blood    products, or been given immune (gamma) globulin or antiviral drug?   No   For women: Are you pregnant or is there a chance you could become       pregnant during the next month?   No   Have you received any vaccinations in the past 4 weeks?   No     Immunization questionnaire was positive for at least one answer.  Notified .    I have reviewed the following standing orders:   This patient is due and qualifies for the Covid-19 vaccine.     Click here for COVID-19 Standing Order    I have reviewed the vaccines inclusion and exclusion criteria; No concerns regarding eligibility.     Patient instructed to remain in clinic for 15 minutes afterwards, and to report any adverse reactions.     Screening  performed by Minna Garcia MA on 11/2/2023 at 5:12 PM.

## 2024-01-30 DIAGNOSIS — F33.0 MILD EPISODE OF RECURRENT MAJOR DEPRESSIVE DISORDER (H): ICD-10-CM

## 2024-01-30 DIAGNOSIS — J30.89 CHRONIC NONSEASONAL ALLERGIC RHINITIS DUE TO POLLEN: ICD-10-CM

## 2024-01-30 DIAGNOSIS — F41.1 GENERALIZED ANXIETY DISORDER: ICD-10-CM

## 2024-01-31 RX ORDER — MONTELUKAST SODIUM 10 MG/1
1 TABLET ORAL
Qty: 90 TABLET | Refills: 0 | Status: SHIPPED | OUTPATIENT
Start: 2024-01-31 | End: 2024-06-10

## 2024-05-05 DIAGNOSIS — J45.20 MILD INTERMITTENT ASTHMA WITHOUT COMPLICATION: ICD-10-CM

## 2024-05-06 RX ORDER — FLUTICASONE FUROATE 200 UG/1
POWDER RESPIRATORY (INHALATION)
Qty: 60 EACH | Refills: 0 | Status: SHIPPED | OUTPATIENT
Start: 2024-05-06 | End: 2024-06-28

## 2024-06-09 DIAGNOSIS — J30.89 CHRONIC NONSEASONAL ALLERGIC RHINITIS DUE TO POLLEN: ICD-10-CM

## 2024-06-10 RX ORDER — MONTELUKAST SODIUM 10 MG/1
1 TABLET ORAL
Qty: 90 TABLET | Refills: 0 | Status: SHIPPED | OUTPATIENT
Start: 2024-06-10 | End: 2024-10-01

## 2024-06-28 DIAGNOSIS — J45.20 MILD INTERMITTENT ASTHMA WITHOUT COMPLICATION: ICD-10-CM

## 2024-06-28 DIAGNOSIS — F41.1 GENERALIZED ANXIETY DISORDER: ICD-10-CM

## 2024-06-28 DIAGNOSIS — F33.0 MILD EPISODE OF RECURRENT MAJOR DEPRESSIVE DISORDER (H): ICD-10-CM

## 2024-06-28 RX ORDER — FLUTICASONE FUROATE 200 UG/1
1 POWDER RESPIRATORY (INHALATION) DAILY
Qty: 90 EACH | Refills: 1 | Status: SHIPPED | OUTPATIENT
Start: 2024-06-28

## 2024-06-28 NOTE — TELEPHONE ENCOUNTER
Asthma check and physical in September.    No future appointments.         11/19/2021     7:11 AM 9/10/2023    10:04 AM 9/15/2023     9:12 AM   ACT Total Scores   ACT TOTAL SCORE (Goal Greater than or Equal to 20) 20 23 23   In the past 12 months, how many times did you visit the emergency room for your asthma without being admitted to the hospital? 0 0 0   In the past 12 months, how many times were you hospitalized overnight because of your asthma? 0 0 0       Health Maintenance Due   Topic Date Due    ASTHMA ACTION PLAN  Never done    IPV IMMUNIZATION (2 of 3 - 4-dose series) 07/11/1997    HEPATITIS C SCREENING  Never done    HPV IMMUNIZATION (2 - 3-dose SCDM series) 05/19/2008    GLUCOSE  10/03/2023    ASTHMA CONTROL TEST  03/15/2024    PHQ-9  03/15/2024    DTAP/TDAP/TD IMMUNIZATION (3 - Td or Tdap) 04/15/2024     BP Readings from Last 3 Encounters:   10/03/23 126/81   09/15/23 130/78   03/21/22 135/82     Sandhya was seen today for medication refill.    Diagnoses and all orders for this visit:    Generalized anxiety disorder  -     sertraline (ZOLOFT) 50 MG tablet; TAKE THREE TABLETS BY MOUTH ONCE DAILY    Mild episode of recurrent major depressive disorder (H24)  -     sertraline (ZOLOFT) 50 MG tablet; TAKE THREE TABLETS BY MOUTH ONCE DAILY    Mild intermittent asthma without complication  -     fluticasone (ARNUITY ELLIPTA) 200 MCG/ACT inhaler; Inhale 1 puff into the lungs daily    Other orders  -     PRIMARY CARE FOLLOW-UP SCHEDULING; Future

## 2024-07-29 ENCOUNTER — PATIENT SELF-TRIAGE (OUTPATIENT)
Dept: URGENT CARE | Facility: CLINIC | Age: 44
End: 2024-07-29
Payer: COMMERCIAL

## 2024-07-29 ENCOUNTER — NURSE TRIAGE (OUTPATIENT)
Dept: FAMILY MEDICINE | Facility: CLINIC | Age: 44
End: 2024-07-29
Payer: COMMERCIAL

## 2024-07-29 ENCOUNTER — MYC MEDICAL ADVICE (OUTPATIENT)
Dept: FAMILY MEDICINE | Facility: CLINIC | Age: 44
End: 2024-07-29
Payer: COMMERCIAL

## 2024-07-29 ASSESSMENT — ASTHMA QUESTIONNAIRES
QUESTION_3 LAST FOUR WEEKS HOW OFTEN DID YOUR ASTHMA SYMPTOMS (WHEEZING, COUGHING, SHORTNESS OF BREATH, CHEST TIGHTNESS OR PAIN) WAKE YOU UP AT NIGHT OR EARLIER THAN USUAL IN THE MORNING: ONCE OR TWICE
QUESTION_2 LAST FOUR WEEKS HOW OFTEN HAVE YOU HAD SHORTNESS OF BREATH: ONCE OR TWICE A WEEK
QUESTION_4 LAST FOUR WEEKS HOW OFTEN HAVE YOU USED YOUR RESCUE INHALER OR NEBULIZER MEDICATION (SUCH AS ALBUTEROL): TWO OR THREE TIMES PER WEEK
QUESTION_5 LAST FOUR WEEKS HOW WOULD YOU RATE YOUR ASTHMA CONTROL: SOMEWHAT CONTROLLED
ACT_TOTALSCORE: 19
ACT_TOTALSCORE: 19
QUESTION_1 LAST FOUR WEEKS HOW MUCH OF THE TIME DID YOUR ASTHMA KEEP YOU FROM GETTING AS MUCH DONE AT WORK, SCHOOL OR AT HOME: NONE OF THE TIME

## 2024-07-29 ASSESSMENT — PATIENT HEALTH QUESTIONNAIRE - PHQ9
SUM OF ALL RESPONSES TO PHQ QUESTIONS 1-9: 0
SUM OF ALL RESPONSES TO PHQ QUESTIONS 1-9: 0
10. IF YOU CHECKED OFF ANY PROBLEMS, HOW DIFFICULT HAVE THESE PROBLEMS MADE IT FOR YOU TO DO YOUR WORK, TAKE CARE OF THINGS AT HOME, OR GET ALONG WITH OTHER PEOPLE: NOT DIFFICULT AT ALL

## 2024-07-29 NOTE — TELEPHONE ENCOUNTER
Reason for Disposition   COVID-19 infection suspected by caller or triager and mild symptoms (cough, fever, or others) and negative COVID-19 rapid test    Additional Information   Negative: SEVERE difficulty breathing (e.g., struggling for each breath, speaks in single words)   Negative: Difficult to awaken or acting confused (e.g., disoriented, slurred speech)   Negative: Bluish (or gray) lips or face now   Negative: Shock suspected (e.g., cold/pale/clammy skin, too weak to stand, low BP, rapid pulse)   Negative: Sounds like a life-threatening emergency to the triager   Negative: Diagnosed or suspected COVID-19 and symptoms lasting 3 or more weeks   Negative: COVID-19 exposure and no symptoms   Negative: COVID-19 vaccine reaction suspected (e.g., fever, headache, muscle aches) occurring 1 to 3 days after getting vaccine   Negative: COVID-19 vaccine, questions about   Negative: Lives with someone known to have influenza (flu test positive) and flu-like symptoms (e.g., cough, runny nose, sore throat, SOB; with or without fever)   Negative: Possible COVID-19 symptoms and triager concerned about severity of symptoms or other causes   Negative: COVID-19 and breastfeeding, questions about   Negative: SEVERE or constant chest pain or pressure  (Exception: Mild central chest pain, present only when coughing.)   Negative: MODERATE difficulty breathing (e.g., speaks in phrases, SOB even at rest, pulse 100-120)   Negative: Headache and stiff neck (can't touch chin to chest)   Negative: Oxygen level (e.g., pulse oximetry) 90% or lower   Negative: Chest pain or pressure  (Exception: MILD central chest pain, present only when coughing.)   Negative: Drinking very little and dehydration suspected (e.g., no urine > 12 hours, very dry mouth, very lightheaded)   Negative: Patient sounds very sick or weak to the triager   Negative: MILD difficulty breathing (e.g., minimal/no SOB at rest, SOB with walking, pulse <100)   Negative: Fever  "> 103 F (39.4 C)   Negative: Fever > 101 F (38.3 C) and over 60 years of age   Negative: Fever > 100.0 F (37.8 C) and bedridden (e.g., CVA, chronic illness, recovering from surgery)   Negative: HIGH RISK patient (e.g., weak immune system, age > 64 years, obesity with BMI of 30 or higher, pregnant, chronic lung disease or other chronic medical condition) and COVID symptoms (e.g., cough, fever)  (Exceptions: Already seen by doctor or NP/PA and no new or worsening symptoms.)   Negative: HIGH RISK patient and influenza is widespread in the community and ONE OR MORE respiratory symptoms: cough, sore throat, runny or stuffy nose   Negative: HIGH RISK patient and influenza exposure within the last 7 days and ONE OR MORE respiratory symptoms: cough, sore throat, runny or stuffy nose   Negative: Oxygen level (e.g., pulse oximetry) 91 to 94%    Answer Assessment - Initial Assessment Questions  1. COVID-19 DIAGNOSIS: \"How do you know that you have COVID?\" (e.g., positive lab test or self-test, diagnosed by doctor or NP/PA, symptoms after exposure).      Home Covid test    2. COVID-19 EXPOSURE: \"Was there any known exposure to COVID before the symptoms began?\" CDC Definition of close contact: within 6 feet (2 meters) for a total of 15 minutes or more over a 24-hour period.       No    3. ONSET: \"When did the COVID-19 symptoms start?\"       7/25/24    4. WORST SYMPTOM: \"What is your worst symptom?\" (e.g., cough, fever, shortness of breath, muscle aches)      Night sweats    5. COUGH: \"Do you have a cough?\" If Yes, ask: \"How bad is the cough?\"        Yes, mild- moderate    6. FEVER: \"Do you have a fever?\" If Yes, ask: \"What is your temperature, how was it measured, and when did it start?\"      No    7. RESPIRATORY STATUS: \"Describe your breathing?\" (e.g., normal; shortness of breath, wheezing, unable to speak)       Chest tightness    8. BETTER-SAME-WORSE: \"Are you getting better, staying the same or getting worse compared to " "yesterday?\"  If getting worse, ask, \"In what way?\"      Worse    9. OTHER SYMPTOMS: \"Do you have any other symptoms?\"  (e.g., chills, fatigue, headache, loss of smell or taste, muscle pain, sore throat)      Headache, muscle ache, joint pain, ear pressure, fatigue, sore throat, congestion.    10. HIGH RISK DISEASE: \"Do you have any chronic medical problems?\" (e.g., asthma, heart or lung disease, weak immune system, obesity, etc.)        Asthma    11. VACCINE: \"Have you had the COVID-19 vaccine?\" If Yes, ask: \"Which one, how many shots, when did you get it?\"        Yes, 5 vaccines total    12. PREGNANCY: \"Is there any chance you are pregnant?\" \"When was your last menstrual period?\"        No    13. O2 SATURATION MONITOR:  \"Do you use an oxygen saturation monitor (pulse oximeter) at home?\" If Yes, ask \"What is your reading (oxygen level) today?\" \"What is your usual oxygen saturation reading?\" (e.g., 95%)        no    Protocols used: Coronavirus (COVID-19) Diagnosed or [id number]    "

## 2024-07-29 NOTE — TELEPHONE ENCOUNTER
RN COVID TREATMENT VISIT  07/29/24      The patient has been triaged and does not require a higher level of care.    Sandhya Caal  44 year old  Current weight? 185    Has the patient been seen by a primary care provider at an [facility] or [facility] within the past two years? Yes.   Have you been in close proximity to/do you have a known exposure to a person with a confirmed case of influenza? No.     General treatment eligibility:  Date of positive COVID test (PCR or at home)?  7/29/24    Are you or have you been hospitalized for this COVID-19 infection? No.   Have you received monoclonal antibodies or antiviral treatment for COVID-19 since this positive test? No.   Do you have any of the following conditions that place you at risk of being very sick from COVID-19?   - Mental health disorders including mood disorders, depression, schizophrenia spectrum disorders   Yes, patient has at least one high risk condition as noted above.     Current COVID symptoms:   - fever or chills  - cough  - fatigue  - muscle or body aches  - headache  - sore throat  - congestion or runny nose  Yes. Patient has at least one symptom as selected.     How many days since symptoms started? 5 days or less. Established patient, 12 years or older weighing at least 88.2 lbs, who has symptoms that started in the past 5 days, has not been hospitalized nor received treatment already, and is at risk for being very sick from COVID-19.     Treatment eligibility by RN:  Are you currently pregnant or nursing? No  Do you have a clinically significant hypersensitivity to nirmatrelvir or ritonavir, or toxic epidermal necrolysis (TEN) or Broussard-Valentin Syndrome? No  Do you have a history of hepatitis, any hepatic impairment on the Problem List (such as Child-Grigsby Class C, cirrhosis, fatty liver disease, alcoholic liver disease), or was the last liver lab (hepatic panel, ALT, AST, ALK Phos, bilirubin) elevated in the past  6 months? No  Do you have any history of severe renal impairment (eGFR < 30mL/min)? No    Is patient eligible to continue? Yes, patient meets all eligibility requirements for the RN COVID treatment (as denoted by all no responses above).     Current Outpatient Medications   Medication Sig Dispense Refill    acetaminophen (TYLENOL) 500 MG tablet [ACETAMINOPHEN (TYLENOL) 500 MG TABLET] Take 1,000 mg by mouth every 6 (six) hours as needed for pain.      albuterol (PROAIR HFA/PROVENTIL HFA/VENTOLIN HFA) 108 (90 Base) MCG/ACT inhaler Inhale 2 puffs into the lungs every 4 hours as needed for wheezing 18 g 3    busPIRone (BUSPAR) 5 MG tablet TAKE ONE TABLET BY MOUTH TWICE A  tablet 3    cholecalciferol, vitamin D3, 2,000 unit capsule [CHOLECALCIFEROL, VITAMIN D3, 2,000 UNIT CAPSULE] Take 2,000 Units by mouth daily.       cyanocobalamin, vitamin B-12, (VITAMIN B-12) 1,000 mcg tablet [CYANOCOBALAMIN, VITAMIN B-12, (VITAMIN B-12) 1,000 MCG TABLET] Take 1,000 mcg by mouth daily as needed.      fexofenadine (ALLEGRA) 180 MG tablet Take 1 tablet (180 mg) by mouth daily 90 tablet 4    fluticasone (ARNUITY ELLIPTA) 200 MCG/ACT inhaler Inhale 1 puff into the lungs daily 90 each 1    fluticasone propionate (FLONASE) 50 mcg/actuation nasal spray [FLUTICASONE PROPIONATE (FLONASE) 50 MCG/ACTUATION NASAL SPRAY] INSTILL TWO SPRAYS INTO EACH NOSTRIL ONCE DAILY 16 g 3    gabapentin (NEURONTIN) 100 MG capsule Take 1 capsule (100 mg) by mouth 3 times daily as needed for neuropathic pain 30 capsule 2    guaiFENesin ER (MUCINEX) 600 mg 12 hr tablet [GUAIFENESIN ER (MUCINEX) 600 MG 12 HR TABLET] Take 1,200 mg by mouth 2 (two) times a day as needed.  (Patient not taking: Reported on 9/15/2023)      hydrOXYzine pamoate (VISTARIL) 50 MG capsule [HYDROXYZINE PAMOATE (VISTARIL) 50 MG CAPSULE] TAKE 1 CAPSULE (50 MG TOTAL) BY MOUTH AT BEDTIME AS NEEDED FOR ANXIETY (AND INSOMNIA). (Patient not taking: Reported on 9/15/2023) 50 capsule 1     Lactobacillus rhamnosus GG (CULTURELLE) 10-15 Billion cell capsule [LACTOBACILLUS RHAMNOSUS GG (CULTURELLE) 10-15 BILLION CELL CAPSULE] Take 1 capsule by mouth daily. (Patient not taking: Reported on 9/15/2023)      montelukast (SINGULAIR) 10 MG tablet TAKE ONE TABLET BY MOUTH AT BEDTIME 90 tablet 0    omeprazole (PRILOSEC OTC) 20 MG tablet [OMEPRAZOLE (PRILOSEC OTC) 20 MG TABLET]  (Patient not taking: Reported on 9/15/2023)      sertraline (ZOLOFT) 50 MG tablet TAKE THREE TABLETS BY MOUTH ONCE DAILY 270 tablet 1       Medications from List 1 of the standing order (on medications that exclude the use of Paxlovid) that patient is taking: NONE. Is patient taking [place]'s Wort? No  Is patient taking Cuca's Wort or any meds from List 1? No.   Medications from List 2 of the standing order (on meds that provider needs to adjust) that patient is taking: buspirone (Buspar), explained a provider visit is necessary to discuss medication adjustments while taking Paxlovid. Is patient on any of the meds from List 2? Yes. Patient will be scheduled or transferred to a  at the end of this call.   Rj Najera RN

## 2024-07-29 NOTE — TELEPHONE ENCOUNTER
Please refer to COVID triage encounter.     Sourav Medina RN  [facility]

## 2024-07-30 ENCOUNTER — VIRTUAL VISIT (OUTPATIENT)
Dept: FAMILY MEDICINE | Facility: CLINIC | Age: 44
End: 2024-07-30
Payer: COMMERCIAL

## 2024-07-30 DIAGNOSIS — U07.1 INFECTION DUE TO 2019 NOVEL CORONAVIRUS: Primary | ICD-10-CM

## 2024-07-30 PROCEDURE — 99441 PR PHYSICIAN TELEPHONE EVALUATION 5-10 MIN: CPT | Performed by: FAMILY MEDICINE

## 2024-07-30 NOTE — PROGRESS NOTES
"Sandhya is a 44 year old who is being evaluated via a billable telephone visit.    What phone number would you like to be contacted at? [phone]   How would you like to obtain your AVS? Ktharwilli  Originating Location (pt. Location): Home    Distant Location (provider location):  Off-site    Assessment & Plan     (U07.1) Infection due to 2019 novel coronavirus  (primary encounter diagnosis)  Comment: off work, and given debilitating symptoms, will start antiviral.  Plan: Basic metabolic panel  (Ca, Cl, CO2, Creat,         Gluc, K, Na, BUN)          Patient stable, no significant concerns for severe pulmonary compromise.          (PAXLOVID) 300 mg/100 mg therapy pack        3 tablets bid for 5 days.       COVID-19 positive patient.  Encounter for consideration of medication intervention. Patient does qualify for a prescription given history of asthma. Full discussion with patient including medication options,Temporary change to home medications:  None     Estimated body mass index is 28.93 kg/m  as calculated from the following:    Height as of 9/15/23: 1.68 m (5' 6.14\").    Weight as of 9/15/23: 81.6 kg (180 lb).  GFR Estimate   Date Value Ref Range Status   10/03/2020 >60 >60 mL/min/1.73m2 Final     Lab Results   Component Value Date    [id number] Negative 08/28/2021       Subjective   Sandhya is a 44 year old, presenting for the following health issues:  Positive home covid test yesterday  symptoms started July 25th, achy joint, sore throat, headache, fatigue. She had covid in January of this year.  GFR Estimate   Date Value Ref Range Status   10/03/2020 >60 >60 mL/min/1.73m2 Final       Covid Concern (Need doctor's note for work)      7/30/2024     7:59 AM   Additional Questions   Roomed by Lindsey ADEN         7/30/2024   Forms   Any forms needing to be completed Yes        History of Present Illness       Reason for visit:  Covid  Symptom onset:  3-7 days ago  Symptoms include:  Fatigue, cough, sore throat, chest " "congestion, nasal congestion  Symptom intensity:  Moderate  Symptom progression:  Staying the same  Had these symptoms before:  Yes  Has tried/received treatment for these symptoms:  Yes  Previous treatment was successful:  Yes  Prior treatment description:  Paxlovid  What makes it worse:  Not resting  What makes it better:  Rest, ibuprofen, mucinex    She eats 4 or more servings of fruits and vegetables daily.She consumes 1 sweetened beverage(s) daily.She exercises with enough effort to increase her heart rate 10 to 19 minutes per day.  She exercises with enough effort to increase her heart rate 4 days per week.   She is taking medications regularly.         Asthma Follow-Up    Was ACT completed today?  Yes        7/29/2024     9:01 PM   ACT Total Scores   ACT TOTAL SCORE (Goal Greater than or Equal to 20) 19   In the past 12 months, how many times did you visit the emergency room for your asthma without being admitted to the hospital? 0   In the past 12 months, how many times were you hospitalized overnight because of your asthma? 0       How many days per week do you miss taking your asthma controller medication?  0  Please describe any recent triggers for your asthma: upper respiratory infections  Have you had any Emergency Room Visits, Urgent Care Visits, or Hospital Admissions since your last office visit?  No      Review of Systems  Constitutional, HEENT, cardiovascular, pulmonary, gi and gu systems are negative, except as otherwise noted.      Objective    Vitals - Patient Reported  Weight (Patient Reported): 83.9 kg (185 lb)  Height (Patient Reported): 167.6 cm (5' 6\")  BMI (Based on Pt Reported Ht/Wt): 29.86  Temperature (Patient Reported): 95  F (35  C)  Pain Score: Severe Pain (6)  Pain Loc: Other - see comment (chest and knees, throat, ear, headache)        Physical Exam   General: Alert and no distress //Respiratory: No audible wheeze, cough, or shortness of breath // Psychiatric:  Appropriate affect, " tone, and pace of words        Phone call duration: 8 minutes  Signed Electronically by: Richelle Ramirez MD

## 2024-07-30 NOTE — LETTER
My Asthma Action Plan    Name: Sandhya Caal   YOB: 1980  Date: 7/30/2024   My doctor: Richelle Ramirez MD   My clinic: [facility]        My Rescue Medicine:   Albuterol inhaler (Proair/Ventolin/Proventil HFA)  2-4 puffs EVERY 4 HOURS as needed. Use a spacer if recommended by your provider.   My Asthma Severity:   Intermittent / Exercise Induced  Know your asthma triggers: upper respiratory infections and exercise or sports             GREEN ZONE   Good Control  I feel good  No cough or wheeze  Can work, sleep and play without asthma symptoms       Take your asthma control medicine every day.     If exercise triggers your asthma, take your rescue medication  15 minutes before exercise or sports, and  During exercise if you have asthma symptoms  Spacer to use with inhaler: If you have a spacer, make sure to use it with your inhaler             YELLOW ZONE Getting Worse  I have ANY of these:  I do not feel good  Cough or wheeze  Chest feels tight  Wake up at night   Keep taking your Green Zone medications  Start taking your rescue medicine:  every 20 minutes for up to 1 hour. Then every 4 hours for 24-48 hours.  If you stay in the Yellow Zone for more than 12-24 hours, contact your doctor.  If you do not return to the Green Zone in 12-24 hours or you get worse, start taking your oral steroid medicine if prescribed by your provider.           RED ZONE Medical Alert - Get Help  I have ANY of these:  I feel awful  Medicine is not helping  Breathing getting harder  Trouble walking or talking  Nose opens wide to breathe       Take your rescue medicine NOW  If your provider has prescribed an oral steroid medicine, start taking it NOW  Call your doctor NOW  If you are still in the Red Zone after 20 minutes and you have not reached your doctor:  Take your rescue medicine again and  Call 911 or go to the emergency room right away    See your regular doctor within 2 weeks of  an Emergency Room or Urgent Care visit for follow-up treatment.          Annual Reminders:  Meet with Asthma Educator,  Flu Shot in the Fall, consider Pneumonia Vaccination for patients with asthma (aged 19 and older).    Pharmacy:    [facility] [id number] [place] [facility] - [place], [place] - [address] [facility] - [place], [place] - [address]    Electronically signed by Richelle Ramirez MD   Date: 07/30/24                    Asthma Triggers  How To Control Things That Make Your Asthma Worse    Triggers are things that make your asthma worse.  Look at the list below to help you find your triggers and   what you can do about them. You can help prevent asthma flare-ups by staying away from your triggers.      Trigger                                                          What you can do   Cigarette Smoke  Tobacco smoke can make asthma worse. Do not allow smoking in your home, car or around you.  Be sure no one smokes at a child s day care or school.  If you smoke, ask your health care provider for ways to help you quit.  Ask family members to quit too.  Ask your health care provider for a referral to Quit Plan to help you quit smoking, or call 4-970-139-PLAN.     Colds, Flu, Bronchitis  These are common triggers of asthma. Wash your hands often.  Don t touch your eyes, nose or mouth.  Get a flu shot every year.     Dust Mites  These are tiny bugs that live in cloth or carpet. They are too small to see. Wash sheets and blankets in hot water every week.   Encase pillows and mattress in dust mite proof covers.  Avoid having carpet if you can. If you have carpet, vacuum weekly.   Use a dust mask and HEPA vacuum.   Pollen and Outdoor Mold  Some people are allergic to trees, grass, or weed pollen, or molds. Try to keep your windows closed.  Limit time out doors when pollen count is high.   Ask you health care provider about taking medicine during allergy season.     Animal Dander  Some people are  allergic to skin flakes, urine or saliva from pets with fur or feathers. Keep pets with fur or feathers out of your home.    If you can t keep the pet outdoors, then keep the pet out of your bedroom.  Keep the bedroom door closed.  Keep pets off cloth furniture and away from stuffed toys.     Mice, Rats, and Cockroaches  Some people are allergic to the waste from these pests.   Cover food and garbage.  Clean up spills and food crumbs.  Store grease in the refrigerator.   Keep food out of the bedroom.   Indoor Mold  This can be a trigger if your home has high moisture. Fix leaking faucets, pipes, or other sources of water.   Clean moldy surfaces.  Dehumidify basement if it is damp and smelly.   Smoke, Strong Odors, and Sprays  These can reduce air quality. Stay away from strong odors and sprays, such as perfume, powder, hair spray, paints, smoke incense, paint, cleaning products, candles and new carpet.   Exercise or Sports  Some people with asthma have this trigger. Be active!  Ask your doctor about taking medicine before sports or exercise to prevent symptoms.    Warm up for 5-10 minutes before and after sports or exercise.     Other Triggers of Asthma  Cold air:  Cover your nose and mouth with a scarf.  Sometimes laughing or crying can be a trigger.  Some medicines and food can trigger asthma.

## 2024-07-30 NOTE — LETTER
July 30, 2024      Sandhya Caal  [address] [address]        To Whom It May Concern:    Sandhya Caal  was evaluated on 7/30/2024 .  Please excuse her  from work until symptoms have resolved.        Sincerely,        Richelle Ramirez MD

## 2024-08-16 ENCOUNTER — PATIENT OUTREACH (OUTPATIENT)
Dept: CARE COORDINATION | Facility: CLINIC | Age: 44
End: 2024-08-16
Payer: COMMERCIAL

## 2024-09-13 ENCOUNTER — PATIENT OUTREACH (OUTPATIENT)
Dept: CARE COORDINATION | Facility: CLINIC | Age: 44
End: 2024-09-13
Payer: COMMERCIAL

## 2024-10-01 DIAGNOSIS — J30.89 CHRONIC NONSEASONAL ALLERGIC RHINITIS DUE TO POLLEN: ICD-10-CM

## 2024-10-01 RX ORDER — MONTELUKAST SODIUM 10 MG/1
1 TABLET ORAL
Qty: 60 TABLET | Refills: 0 | Status: SHIPPED | OUTPATIENT
Start: 2024-10-01

## 2024-10-15 ENCOUNTER — E-VISIT (OUTPATIENT)
Dept: URGENT CARE | Facility: CLINIC | Age: 44
End: 2024-10-15
Payer: COMMERCIAL

## 2024-10-15 DIAGNOSIS — R21 RASH: Primary | ICD-10-CM

## 2024-10-15 PROCEDURE — 99207 PR NON-BILLABLE SERV PER CHARTING: CPT | Performed by: FAMILY MEDICINE

## 2024-10-16 NOTE — PATIENT INSTRUCTIONS
Dear Sandhya Caal?     After reviewing your responses, I am unable to make a diagnosis that can be treated online.    You will not be charged for this eVisit.     We are dedicated to helping you achieve your best health and would like to see you in one of our many clinic locations - a primary care provider would be ideal for your concern.    Please use [facility] to schedule a visit with a provider or call [phone] ([phone]) to schedule at any of our locations.    Thanks for choosing?us?as your health care partner,?   ?   Jose Traore MD?

## 2024-10-17 NOTE — PROGRESS NOTES
FOOT AND ANKLE SURGERY/PODIATRY CONSULT NOTE         ASSESSMENT:   Stage I hallux limitus left foot  Pronation deformity bilateral feet      TREATMENT:  I have elected to treat the patient conservatively at this time.  I have recommended a rigid orthotic to restrict motion at the first metatarsal phalangeal joint.  They also to control her pronation.  After wearing her orthotics consistently for 2 to 3 months if her symptoms do not improve I recommended patient return to the clinic for an x-ray of her left foot and possible cheilectomy of the left foot.        HPI: I was asked to see Sandhya RUSSO Afua today to evaluate and treat painful first metatarsophalangeal joint left foot.  The patient stated that the pain is moderate.  It is aggravated with activity.  She describes it as a aching type pain which has been progressing over the past several months.  She is extremely active.  She participates in yoga.  She states that these activities also aggravate her big toe joint left foot.  The pain radiates from the big toe joint to the tip of her left great toe.  She denies any associated redness or swelling.  The pain is relieved with nonweightbearing.  She denies any other previous treatment.  The patient was seen in consultation at the request of Amira Kevin MD for evaluation and treatment of a painful first metatarsophalangeal joint left foot.      Past Medical History:   Diagnosis Date    Arthritis 2018    Depressive disorder 1997    Dyshidrotic eczema 12/02/2016    Left 4th finger    Iron deficiency anemia 12/02/2016    Due to menorrhagia. Treated with endometrial ablation.    Major depressive disorder, recurrent episode (H24)     Menorrhagia 12/02/2016    Treated with endometrial ablation.    Uncomplicated asthma 1995       Social History     Socioeconomic History    Marital status:      Spouse name: Newton Moore    Number of children: 2    Years of education: Bachelors degree    Highest education level: Not on  Head,  normocephalic,  atraumatic,  Face,  Face within normal limits,  Ears,  External ears within normal limits,  Nose/Nasopharynx,  External nose  normal appearance, no nasal discharge,  Mouth and Throat,  Oral cavity appearance normal Lips,  Appearance normal file   Occupational History    Occupation:    Tobacco Use    Smoking status: Never     Passive exposure: Never    Smokeless tobacco: Never   Vaping Use    Vaping Use: Never used   Substance and Sexual Activity    Alcohol use: Not Currently     Comment: Very occasional    Drug use: Yes     Types: Marijuana     Comment: Occasional THC beverages    Sexual activity: Yes     Partners: Male     Birth control/protection: Female Surgical     Comment: Tubal ligation 2016   Other Topics Concern    Parent/sibling w/ CABG, MI or angioplasty before 65F 55M? No   Social History Narrative    : Newton Caal  Kids: Luiz 2012, Leroy 2014.      Diet: Increasing protein, eats fruits and veggies.  Calcium + D - supplements.    Exercise: yoga, running, weights. Skiing.     Social Determinants of Health     Financial Resource Strain: Not on file   Food Insecurity: Not on file   Transportation Needs: Not on file   Physical Activity: Not on file   Stress: Not on file   Social Connections: Not on file   Interpersonal Safety: Not on file   Housing Stability: Not on file          Allergies   Allergen Reactions    Morphine Unknown    Theophylline Dizziness and Palpitations     Annotation: Likely a toxicity issue            Current Outpatient Medications:     acetaminophen (TYLENOL) 500 MG tablet, [ACETAMINOPHEN (TYLENOL) 500 MG TABLET] Take 1,000 mg by mouth every 6 (six) hours as needed for pain., Disp: , Rfl:     albuterol (PROAIR HFA/PROVENTIL HFA/VENTOLIN HFA) 108 (90 Base) MCG/ACT inhaler, Inhale 2 puffs into the lungs every 4 hours as needed for wheezing, Disp: 18 g, Rfl: 3    busPIRone (BUSPAR) 5 MG tablet, TAKE ONE TABLET BY MOUTH TWICE A DAY, Disp: 180 tablet, Rfl: 3    cholecalciferol, vitamin D3, 2,000 unit capsule, [CHOLECALCIFEROL, VITAMIN D3, 2,000 UNIT CAPSULE] Take 2,000 Units by mouth daily. , Disp: , Rfl:     cyanocobalamin, vitamin B-12, (VITAMIN B-12) 1,000 mcg tablet, [CYANOCOBALAMIN, VITAMIN B-12,  (VITAMIN B-12) 1,000 MCG TABLET] Take 1,000 mcg by mouth daily as needed., Disp: , Rfl:     fexofenadine (ALLEGRA) 180 MG tablet, Take 1 tablet (180 mg) by mouth daily, Disp: 90 tablet, Rfl: 4    fluticasone (ARNUITY ELLIPTA) 200 MCG/ACT inhaler, Inhale 1 puff into the lungs daily, Disp: 90 each, Rfl: 4    fluticasone propionate (FLONASE) 50 mcg/actuation nasal spray, [FLUTICASONE PROPIONATE (FLONASE) 50 MCG/ACTUATION NASAL SPRAY] INSTILL TWO SPRAYS INTO EACH NOSTRIL ONCE DAILY, Disp: 16 g, Rfl: 3    gabapentin (NEURONTIN) 100 MG capsule, Take 1 capsule (100 mg) by mouth 3 times daily as needed for neuropathic pain, Disp: 30 capsule, Rfl: 2    montelukast (SINGULAIR) 10 MG tablet, Take 1 tablet (10 mg) by mouth At Bedtime, Disp: 90 tablet, Rfl: 4    sertraline (ZOLOFT) 50 MG tablet, Take 3 tablets (150 mg) by mouth daily, Disp: 270 tablet, Rfl: 4    guaiFENesin ER (MUCINEX) 600 mg 12 hr tablet, [GUAIFENESIN ER (MUCINEX) 600 MG 12 HR TABLET] Take 1,200 mg by mouth 2 (two) times a day as needed.  (Patient not taking: Reported on 9/15/2023), Disp: , Rfl:     hydrOXYzine pamoate (VISTARIL) 50 MG capsule, [HYDROXYZINE PAMOATE (VISTARIL) 50 MG CAPSULE] TAKE 1 CAPSULE (50 MG TOTAL) BY MOUTH AT BEDTIME AS NEEDED FOR ANXIETY (AND INSOMNIA). (Patient not taking: Reported on 9/15/2023), Disp: 50 capsule, Rfl: 1    Lactobacillus rhamnosus GG (CULTURELLE) 10-15 Billion cell capsule, [LACTOBACILLUS RHAMNOSUS GG (CULTURELLE) 10-15 BILLION CELL CAPSULE] Take 1 capsule by mouth daily. (Patient not taking: Reported on 9/15/2023), Disp: , Rfl:     omeprazole (PRILOSEC OTC) 20 MG tablet, [OMEPRAZOLE (PRILOSEC OTC) 20 MG TABLET]  (Patient not taking: Reported on 9/15/2023), Disp: , Rfl:      Family History   Problem Relation Age of Onset    Hypertension Mother     Depression Mother     Hyperlipidemia Mother     Other - See Comments Mother         Back problems - spinal stenosis. Multiple surgeries. Bronchiectasis. Lichen sclerosis     Osteoarthritis Mother     Kidney Disease Mother         Long term Vioxx use    Osteoporosis Mother     Hypertension Father     Hyperlipidemia Father     Arrhythmia Father         Atrial fibrillation, had ablation    Dementia Father         Vascular dementia    Osteoarthritis Father     Hypertension Sister     Anxiety Disorder Sister     Depression Sister     Diabetes Sister         Diagnosed around 2021    Obesity Sister     Anxiety Disorder Sister     Depression Brother         seasonal    Rheumatoid Arthritis Maternal Grandmother     Osteoporosis Maternal Grandmother     Heart Disease Maternal Grandfather     Coronary Artery Disease Maternal Grandfather     Other - See Comments Paternal Grandmother 102        Longevity    Kidney Disease Paternal Grandfather     Asthma Son     No Known Problems Son     Breast Cancer Maternal Aunt 73    Endometriosis Maternal Aunt     Breast Cancer Maternal Aunt 78        In-situ?    Heart Disease Maternal Uncle 78    Thyroid Cancer Maternal Cousin     Breast Cancer Other         Maternal great aunt    Prostate Cancer Paternal Uncle         Uncle    Prostate Cancer Paternal Uncle         Uncle    Prostate Cancer Paternal Uncle         Uncle    Parkinsonism Paternal Uncle         Social History     Socioeconomic History    Marital status:      Spouse name: Newton Moore    Number of children: 2    Years of education: Bachelors degree    Highest education level: Not on file   Occupational History    Occupation:    Tobacco Use    Smoking status: Never     Passive exposure: Never    Smokeless tobacco: Never   Vaping Use    Vaping Use: Never used   Substance and Sexual Activity    Alcohol use: Not Currently     Comment: Very occasional    Drug use: Yes     Types: Marijuana     Comment: Occasional THC beverages    Sexual activity: Yes     Partners: Male     Birth control/protection: Female Surgical     Comment: Tubal ligation 2016   Other Topics Concern     Parent/sibling w/ CABG, MI or angioplasty before 65F 55M? No   Social History Narrative    : Newton Caal  Kids: Luiz 2012, Leroy 2014.      Diet: Increasing protein, eats fruits and veggies.  Calcium + D - supplements.    Exercise: yoga, running, weights. Skiing.     Social Determinants of Health     Financial Resource Strain: Not on file   Food Insecurity: Not on file   Transportation Needs: Not on file   Physical Activity: Not on file   Stress: Not on file   Social Connections: Not on file   Interpersonal Safety: Not on file   Housing Stability: Not on file        Review of Systems - Patient denies fever, chills, rash, wound, stiffness, limping, numbness, weakness, heart burn, blood in stool, chest pain with activity, calf pain when walking, shortness of breath with activity, chronic cough, easy bleeding/bruising, swelling of ankles, excessive thirst, fatigue, depression, anxiety.  Patient admits to painful first metatarsal phalangeal joint left foot.      OBJECTIVE:  Appearance: alert, well appearing, and in no distress.    There were no vitals taken for this visit.     There is no height or weight on file to calculate BMI.     General appearance: Patient is alert and fully cooperative with history & exam.  No sign of distress is noted during the visit.  Psychiatric: Affect is pleasant & appropriate.  Patient appears motivated to improve health.  Respiratory: Breathing is regular & unlabored while sitting.  HEENT: Hearing is intact to spoken word.  Speech is clear.  No gross evidence of visual impairment that would impact ambulation.    Vascular: Dorsalis pedis and posterior tibial pulses are palpable. There is pedal hair growth bilaterally.  CFT < 3 sec from anterior tibial surface to distal digits bilaterally. There is no appreciable edema noted.  Dermatologic: Turgor and texture are within normal limits. No coloration or temperature changes. No primary or secondary lesions noted.  Neurologic: All  epicritic and proprioceptive sensations are grossly intact bilaterally.  Musculoskeletal: All active and passive ankle, subtalar, midtarsal, and 1st MPJ range of motion are grossly intact without pain or crepitus, with the exception of none. Manual muscle strength is within normal limits bilaterally. All dorsiflexors, plantarflexors, invertors, evertors are intact bilaterally. Tenderness present to the first metatarsal phalangeal joint left foot on palpation.  Mild tenderness to the first MPJ left foot with range of motion. Calf is soft/non-tender without warmth/induration    Imaging:       No images are attached to the encounter or orders placed in the encounter.     MA Screening Bilateral w/ Doron    Result Date: 9/18/2023  BILATERAL FULL FIELD DIGITAL SCREENING MAMMOGRAM WITH TOMOSYNTHESIS Performed on: 9/15/23 Compared to: 04/18/2022 and 02/23/2021 Technique:  This study was evaluated with the assistance of Computer-Aided Detection.  Breast Tomosynthesis was used in interpretation. Findings: The breasts are heterogeneously dense, which may obscure small masses.  There is no radiographic evidence of malignancy.     IMPRESSION: ACR BI-RADS Category 1: Negative RECOMMENDED FOLLOW-UP: Annual routine screening mammogram The results and recommendations of this examination will be communicated to the patient. Mckinley Salazar      MA Screening Bilateral w/ Doron    Result Date: 9/18/2023  BILATERAL FULL FIELD DIGITAL SCREENING MAMMOGRAM WITH TOMOSYNTHESIS Performed on: 9/15/23 Compared to: 04/18/2022 and 02/23/2021 Technique:  This study was evaluated with the assistance of Computer-Aided Detection.  Breast Tomosynthesis was used in interpretation. Findings: The breasts are heterogeneously dense, which may obscure small masses.  There is no radiographic evidence of malignancy.     IMPRESSION: ACR BI-RADS Category 1: Negative RECOMMENDED FOLLOW-UP: Annual routine screening mammogram The results and recommendations of  this examination will be communicated to the patient. Mckinley Rios; ROSA  [facility]

## 2024-10-19 ENCOUNTER — NURSE TRIAGE (OUTPATIENT)
Dept: NURSING | Facility: CLINIC | Age: 44
End: 2024-10-19
Payer: COMMERCIAL

## 2024-10-19 ENCOUNTER — HOSPITAL ENCOUNTER (EMERGENCY)
Facility: CLINIC | Age: 44
Discharge: HOME | End: 2024-10-19
Attending: EMERGENCY MEDICINE | Admitting: EMERGENCY MEDICINE
Payer: COMMERCIAL

## 2024-10-19 ENCOUNTER — APPOINTMENT (OUTPATIENT)
Dept: RADIOLOGY | Facility: CLINIC | Age: 44
End: 2024-10-19
Attending: EMERGENCY MEDICINE
Payer: COMMERCIAL

## 2024-10-19 VITALS
DIASTOLIC BLOOD PRESSURE: 87 MMHG | BODY MASS INDEX: 29.57 KG/M2 | SYSTOLIC BLOOD PRESSURE: 130 MMHG | HEART RATE: 98 BPM | OXYGEN SATURATION: 99 % | HEIGHT: 66 IN | RESPIRATION RATE: 20 BRPM | TEMPERATURE: 97.3 F | WEIGHT: 184 LBS

## 2024-10-19 DIAGNOSIS — B34.9 VIRAL SYNDROME: ICD-10-CM

## 2024-10-19 LAB
ALBUMIN SERPL BCG-MCNC: 4.5 G/DL (ref 3.5–5.2)
ALBUMIN UR-MCNC: NEGATIVE MG/DL
ALP SERPL-CCNC: 63 U/L (ref 40–150)
ALT SERPL W P-5'-P-CCNC: 13 U/L (ref 0–50)
ANION GAP SERPL CALCULATED.3IONS-SCNC: 15 MMOL/L (ref 7–15)
APPEARANCE UR: CLEAR
AST SERPL W P-5'-P-CCNC: 17 U/L (ref 0–45)
BACTERIA #/AREA URNS HPF: ABNORMAL /HPF
BASOPHILS # BLD AUTO: 0 10E3/UL (ref 0–0.2)
BASOPHILS NFR BLD AUTO: 0 %
BILIRUB SERPL-MCNC: 0.3 MG/DL
BILIRUB UR QL STRIP: NEGATIVE
BUN SERPL-MCNC: 9 MG/DL (ref 6–20)
CALCIUM SERPL-MCNC: 9 MG/DL (ref 8.8–10.4)
CHLORIDE SERPL-SCNC: 103 MMOL/L (ref 98–107)
CK SERPL-CCNC: 56 U/L (ref 26–192)
COLOR UR AUTO: COLORLESS
CREAT SERPL-MCNC: 0.65 MG/DL (ref 0.51–0.95)
CRP SERPL-MCNC: 7.64 MG/L
EGFRCR SERPLBLD CKD-EPI 2021: >90 ML/MIN/1.73M2
EOSINOPHIL # BLD AUTO: 0 10E3/UL (ref 0–0.7)
EOSINOPHIL NFR BLD AUTO: 1 %
ERYTHROCYTE [DISTWIDTH] IN BLOOD BY AUTOMATED COUNT: 12.6 % (ref 10–15)
ERYTHROCYTE [SEDIMENTATION RATE] IN BLOOD BY WESTERGREN METHOD: 12 MM/HR (ref 0–20)
FLUAV RNA SPEC QL NAA+PROBE: NEGATIVE
FLUBV RNA RESP QL NAA+PROBE: NEGATIVE
GLUCOSE SERPL-MCNC: 97 MG/DL (ref 70–99)
GLUCOSE UR STRIP-MCNC: NEGATIVE MG/DL
HCO3 SERPL-SCNC: 22 MMOL/L (ref 22–29)
HCT VFR BLD AUTO: 39.4 % (ref 35–47)
HGB BLD-MCNC: 13.2 G/DL (ref 11.7–15.7)
HGB UR QL STRIP: NEGATIVE
HOLD SPECIMEN: NORMAL
IMM GRANULOCYTES # BLD: 0 10E3/UL
IMM GRANULOCYTES NFR BLD: 0 %
KETONES UR STRIP-MCNC: NEGATIVE MG/DL
LACTATE SERPL-SCNC: 0.9 MMOL/L (ref 0.7–2)
LEUKOCYTE ESTERASE UR QL STRIP: NEGATIVE
LYMPHOCYTES # BLD AUTO: 2 10E3/UL (ref 0.8–5.3)
LYMPHOCYTES NFR BLD AUTO: 26 %
MCH RBC QN AUTO: 28.7 PG (ref 26.5–33)
MCHC RBC AUTO-ENTMCNC: 33.5 G/DL (ref 31.5–36.5)
MCV RBC AUTO: 86 FL (ref 78–100)
MONOCYTES # BLD AUTO: 0.5 10E3/UL (ref 0–1.3)
MONOCYTES NFR BLD AUTO: 7 %
NEUTROPHILS # BLD AUTO: 5.2 10E3/UL (ref 1.6–8.3)
NEUTROPHILS NFR BLD AUTO: 66 %
NITRATE UR QL: NEGATIVE
NRBC # BLD AUTO: 0 10E3/UL
NRBC BLD AUTO-RTO: 0 /100
PH UR STRIP: 7.5 [PH] (ref 5–7)
PLATELET # BLD AUTO: 300 10E3/UL (ref 150–450)
POTASSIUM SERPL-SCNC: 3.9 MMOL/L (ref 3.4–5.3)
PROT SERPL-MCNC: 7.7 G/DL (ref 6.4–8.3)
RBC # BLD AUTO: 4.6 10E6/UL (ref 3.8–5.2)
RBC URINE: 1 /HPF
RSV RNA SPEC NAA+PROBE: NEGATIVE
SARS-COV-2 RNA RESP QL NAA+PROBE: NEGATIVE
SODIUM SERPL-SCNC: 140 MMOL/L (ref 135–145)
SP GR UR STRIP: 1 (ref 1–1.03)
SQUAMOUS EPITHELIAL: 3 /HPF
UROBILINOGEN UR STRIP-MCNC: <2 MG/DL
WBC # BLD AUTO: 7.8 10E3/UL (ref 4–11)
WBC URINE: 1 /HPF

## 2024-10-19 PROCEDURE — 258N000003 HC RX IP 258 OP 636: Performed by: EMERGENCY MEDICINE

## 2024-10-19 PROCEDURE — 85652 RBC SED RATE AUTOMATED: CPT | Performed by: EMERGENCY MEDICINE

## 2024-10-19 PROCEDURE — 99284 EMERGENCY DEPT VISIT MOD MDM: CPT | Mod: 25

## 2024-10-19 PROCEDURE — 87637 SARSCOV2&INF A&B&RSV AMP PRB: CPT | Performed by: EMERGENCY MEDICINE

## 2024-10-19 PROCEDURE — 96361 HYDRATE IV INFUSION ADD-ON: CPT

## 2024-10-19 PROCEDURE — 86140 C-REACTIVE PROTEIN: CPT | Performed by: EMERGENCY MEDICINE

## 2024-10-19 PROCEDURE — 82550 ASSAY OF CK (CPK): CPT | Performed by: EMERGENCY MEDICINE

## 2024-10-19 PROCEDURE — 250N000011 HC RX IP 250 OP 636: Performed by: EMERGENCY MEDICINE

## 2024-10-19 PROCEDURE — 96374 THER/PROPH/DIAG INJ IV PUSH: CPT

## 2024-10-19 PROCEDURE — 36415 COLL VENOUS BLD VENIPUNCTURE: CPT | Performed by: EMERGENCY MEDICINE

## 2024-10-19 PROCEDURE — 80053 COMPREHEN METABOLIC PANEL: CPT | Performed by: EMERGENCY MEDICINE

## 2024-10-19 PROCEDURE — 81001 URINALYSIS AUTO W/SCOPE: CPT | Performed by: EMERGENCY MEDICINE

## 2024-10-19 PROCEDURE — 85025 COMPLETE CBC W/AUTO DIFF WBC: CPT | Performed by: EMERGENCY MEDICINE

## 2024-10-19 PROCEDURE — 83605 ASSAY OF LACTIC ACID: CPT | Performed by: EMERGENCY MEDICINE

## 2024-10-19 PROCEDURE — 250N000013 HC RX MED GY IP 250 OP 250 PS 637: Performed by: EMERGENCY MEDICINE

## 2024-10-19 PROCEDURE — 71045 X-RAY EXAM CHEST 1 VIEW: CPT

## 2024-10-19 RX ORDER — ACETAMINOPHEN 325 MG/1
975 TABLET ORAL ONCE
Status: COMPLETED | OUTPATIENT
Start: 2024-10-19 | End: 2024-10-19

## 2024-10-19 RX ORDER — NAPROXEN 500 MG/1
500 TABLET ORAL 2 TIMES DAILY WITH MEALS
Qty: 16 TABLET | Refills: 0 | Status: SHIPPED | OUTPATIENT
Start: 2024-10-19 | End: 2024-10-27

## 2024-10-19 RX ORDER — DIAZEPAM 5 MG/1
5 TABLET ORAL EVERY 6 HOURS PRN
Qty: 15 TABLET | Refills: 0 | Status: SHIPPED | OUTPATIENT
Start: 2024-10-19 | End: 2024-11-04

## 2024-10-19 RX ORDER — ONDANSETRON 4 MG/1
4 TABLET, FILM COATED ORAL EVERY 8 HOURS PRN
Qty: 12 TABLET | Refills: 0 | Status: SHIPPED | OUTPATIENT
Start: 2024-10-19 | End: 2024-11-04

## 2024-10-19 RX ORDER — KETOROLAC TROMETHAMINE 15 MG/ML
15 INJECTION, SOLUTION INTRAMUSCULAR; INTRAVENOUS ONCE
Status: COMPLETED | OUTPATIENT
Start: 2024-10-19 | End: 2024-10-19

## 2024-10-19 RX ORDER — ONDANSETRON 2 MG/ML
4 INJECTION INTRAMUSCULAR; INTRAVENOUS EVERY 30 MIN PRN
Status: DISCONTINUED | OUTPATIENT
Start: 2024-10-19 | End: 2024-10-19 | Stop reason: HOSPADM

## 2024-10-19 RX ADMIN — SODIUM CHLORIDE 500 ML: 9 INJECTION, SOLUTION INTRAVENOUS at 12:31

## 2024-10-19 RX ADMIN — KETOROLAC TROMETHAMINE 15 MG: 15 INJECTION, SOLUTION INTRAMUSCULAR; INTRAVENOUS at 12:30

## 2024-10-19 RX ADMIN — ACETAMINOPHEN 975 MG: 325 TABLET ORAL at 12:31

## 2024-10-19 ASSESSMENT — ENCOUNTER SYMPTOMS
FEVER: 0
NECK PAIN: 1
COUGH: 1
HEADACHES: 1
MYALGIAS: 1
VOMITING: 1
SORE THROAT: 1
NAUSEA: 1
EYES NEGATIVE: 1

## 2024-10-19 ASSESSMENT — ACTIVITIES OF DAILY LIVING (ADL)
ADLS_ACUITY_SCORE: 35
ADLS_ACUITY_SCORE: 35

## 2024-10-19 ASSESSMENT — COLUMBIA-SUICIDE SEVERITY RATING SCALE - C-SSRS
1. IN THE PAST MONTH, HAVE YOU WISHED YOU WERE DEAD OR WISHED YOU COULD GO TO SLEEP AND NOT WAKE UP?: NO
6. HAVE YOU EVER DONE ANYTHING, STARTED TO DO ANYTHING, OR PREPARED TO DO ANYTHING TO END YOUR LIFE?: NO
2. HAVE YOU ACTUALLY HAD ANY THOUGHTS OF KILLING YOURSELF IN THE PAST MONTH?: NO

## 2024-10-19 NOTE — ED PROVIDER NOTES
"EMERGENCY DEPARTMENT ENCOUNTER       ED Course & Medical Decision Making       I saw and examined the patient.  I read through the nursing notes and prehospital notes which have a lot of \"buzz words\" for meningitis and I did have a very open discussion with the patient about this.  She is nontoxic.  She had a person in her household go through similar illness with complete resolution and her neck pain is more myalgias of her trapezius and scalene muscles and not meningismus.  This is clearly not bacterial meningitis.    I discussed risks and benefits of performing LP as part of the workup and after discussing this thoroughly she would not like to pursue this and I feel that that is very reasonable.      She was tested for influenza, RSV and COVID and these were negative.  No pneumonia on chest x-ray.  Her labs actually look quite good with no leukocytosis.  Normal ESR and CRP just above normal.      History and physical is consistent with viral syndrome.  I do feel it will resolve with just conservative management and I have provided some Valium for her muscle aches and spasm as well as some naproxen to help with her symptoms.      11:49 AM I met with the patient, obtained history, performed an initial exam, and discussed options and plan for diagnostics and treatment here in the ED.  1:49 PM Rechecked and updated patient on lab and imaging results. Discussed plan for discharge.    Medical Decision Making  Obtained supplemental history:Supplemental history obtained?: Documented in chart  Reviewed external records: External records reviewed?: Documented in chart  Care impacted by chronic illness:Documented in Chart  Care significantly affected by social determinants of health:N/A  Did you consider but not order tests?: Work up considered but not performed and documented in chart, if applicable  Did you interpret images independently?: Independent interpretation of ECG and images noted in documentation, when " applicable.  Consultation discussion with other provider:Did you involve another provider (consultant, , pharmacy, etc.)?: No  Discharge. No recommendations on prescription strength medication(s). See documentation for any additional details.             Prior to making a final disposition on this patient the results of patient's tests and other diagnostic studies were discussed with the patient. All questions were answered. Patient expressed understanding of the plan and was amenable to it.    Medications   ondansetron (ZOFRAN) injection 4 mg (has no administration in time range)   sodium chloride 0.9% BOLUS 500 mL (0 mLs Intravenous Stopped 10/19/24 1312)   acetaminophen (TYLENOL) tablet 975 mg (975 mg Oral $Given 10/19/24 1231)   ketorolac (TORADOL) injection 15 mg (15 mg Intravenous $Given 10/19/24 1230)       Final Impression     1. Viral syndrome            Chief Complaint     Chief Complaint   Patient presents with    Headache    Neck Pain    Vomiting    Rash       Patient presents to ED with complaint of headache, stiff neck, and intermittent nausea and vomiting for a few days. Also states she had a rash that started on Tuesday on her arm and torso. History of migraines but headache and neck pain feels different than her normal.      Triage Assessment (Adult)       Row Name 10/19/24 1120          Triage Assessment    Airway WDL WDL        Respiratory WDL    Respiratory WDL WDL        Skin Circulation/Temperature WDL    Skin Circulation/Temperature WDL WDL        Cardiac WDL    Cardiac WDL WDL        Peripheral/Neurovascular WDL    Peripheral Neurovascular WDL WDL        Cognitive/Neuro/Behavioral WDL    Cognitive/Neuro/Behavioral WDL WDL                         HPI       Sandhya Caal is a pleasant 44 year old female with PMHx of mild intermittent asthma, migraine headache, and fibromyalgia, who presents to the ED for evaluation of multiple complaints.    Patient states she lives with an exchange  student. Reports one week ago, the exchange student came down with a headache, neck pain, vomiting and was sick so he had to take some days off from school. The exchange students' symptoms spontaneously resolved and the patient developed the same symptoms not long after. Patient endorses diffuse myalgias, moderate productive cough, sore throat, neck stiffness, and a frontal headache. She took ibuprofen for it. Patient has been nauseated with this and had a couple of episodes of vomiting. Patient noticed she had a rash inside her left elbow a few days and it spontaneously resolved. No fevers. Patient denies vision changes or confusion. Patient called the nurse triage line and expressed concerns for meningitis. She was recommended to go to [facility]. No other reported complaints or concerns at this time.    ISarah am serving as a scribe to document services personally performed by Dillon Dozier M.D. based on my observation and the provider's statements to me. IDillon M.D attest that Sarah Olivarez is acting in a scribe capacity, has observed my performance of the services and has documented them in accordance with my direction.    Past Medical History     Past Medical History:   Diagnosis Date    Arthritis 2018    Depressive disorder 1997    Dyshidrotic eczema 12/02/2016    Iron deficiency anemia 12/02/2016    Major depressive disorder, recurrent episode (H)     Menorrhagia 12/02/2016    Uncomplicated asthma 1995     Past Surgical History:   Procedure Laterality Date    ENDOMETRIAL ABLATION  12/2018    ENT SURGERY  2006    SINUS SURGERY Right 02/01/2006    postoperative nausea + vomiting. Postop pain severe.    TUBAL LIGATION  12/01/2016    with lysis of adhesions. Dr. Iman Condon [facility]     Family History   Problem Relation Age of Onset    Hypertension Mother     Depression Mother     Hyperlipidemia Mother     Other - See Comments Mother         Back problems - spinal stenosis.  Multiple surgeries. Bronchiectasis. Lichen sclerosis    Osteoarthritis Mother     Kidney Disease Mother         Long term Vioxx use    Osteoporosis Mother     Hypertension Father     Hyperlipidemia Father     Arrhythmia Father         Atrial fibrillation, had ablation    Dementia Father         Vascular dementia    Osteoarthritis Father     Hypertension Sister     Anxiety Disorder Sister     Depression Sister     Diabetes Sister         Diagnosed around 2021    Obesity Sister     Anxiety Disorder Sister     Depression Brother         seasonal    Rheumatoid Arthritis Maternal Grandmother     Osteoporosis Maternal Grandmother     Heart Disease Maternal Grandfather     Coronary Artery Disease Maternal Grandfather     Other - See Comments Paternal Grandmother 102        Longevity    Kidney Disease Paternal Grandfather     Asthma Son     No Known Problems Son     Breast Cancer Maternal Aunt 73    Endometriosis Maternal Aunt     Breast Cancer Maternal Aunt 78        In-situ?    Heart Disease Maternal Uncle 78    Thyroid Cancer Maternal Cousin     Breast Cancer Other         Maternal great aunt    Prostate Cancer Paternal Uncle         Uncle    Prostate Cancer Paternal Uncle         Uncle    Prostate Cancer Paternal Uncle         Uncle    Parkinsonism Paternal Uncle       Social History     Tobacco Use    Smoking status: Never     Passive exposure: Never    Smokeless tobacco: Never   Vaping Use    Vaping status: Never Used   Substance Use Topics    Alcohol use: Not Currently     Comment: Very occasional    Drug use: Yes     Types: Marijuana     Comment: Occasional THC beverages       Relevant past medical, surgical, family and social history as documented above, has been reviewed and discussed with patient. No changes or additions, unless otherwise noted in the HPI.    Current Medications     diazepam (VALIUM) 5 MG tablet  naproxen (NAPROSYN) 500 MG tablet  ondansetron (ZOFRAN) 4 MG tablet  acetaminophen (TYLENOL) 500 MG  "tablet  albuterol (PROAIR HFA/PROVENTIL HFA/VENTOLIN HFA) 108 (90 Base) MCG/ACT inhaler  busPIRone (BUSPAR) 5 MG tablet  cholecalciferol, vitamin D3, 2,000 unit capsule  cyanocobalamin, vitamin B-12, (VITAMIN B-12) 1,000 mcg tablet  fexofenadine (ALLEGRA) 180 MG tablet  fluticasone (ARNUITY ELLIPTA) 200 MCG/ACT inhaler  fluticasone propionate (FLONASE) 50 mcg/actuation nasal spray  gabapentin (NEURONTIN) 100 MG capsule  guaiFENesin ER (MUCINEX) 600 mg 12 hr tablet  hydrOXYzine pamoate (VISTARIL) 50 MG capsule  Lactobacillus rhamnosus GG (CULTURELLE) 10-15 Billion cell capsule  montelukast (SINGULAIR) 10 MG tablet  sertraline (ZOLOFT) 50 MG tablet        Allergies     Allergies   Allergen Reactions    Morphine Unknown    Theophylline Dizziness and Palpitations     Annotation: Likely a toxicity issue         Review of Systems     Review of Systems   Constitutional:  Negative for fever.   HENT:  Positive for sore throat.    Eyes: Negative.    Respiratory:  Positive for cough (moderate productive).    Gastrointestinal:  Positive for nausea and vomiting.   Musculoskeletal:  Positive for myalgias (diffuse) and neck pain (stiffness).   Neurological:  Positive for headaches (frontal).        Negative for confusion   All other systems reviewed and are negative.       Remainder of systems reviewed, unless noted in HPI all others negative.    Physical Exam     /87   Pulse 98   Temp 97.3  F (36.3  C) (Oral)   Resp 20   Ht 1.676 m (5' 6\")   Wt 83.5 kg (184 lb)   SpO2 99%   BMI 29.70 kg/m      Physical Exam  Vitals and nursing note reviewed.   Constitutional:       Appearance: She is not ill-appearing or toxic-appearing.      Comments: Pleasant, interacting, laughing and joking during history taking.   HENT:      Head: Normocephalic.      Nose: Nose normal.   Eyes:      Extraocular Movements: Extraocular movements intact.      Pupils: Pupils are equal, round, and reactive to light.   Cardiovascular:      Rate and " Rhythm: Normal rate.   Pulmonary:      Effort: Pulmonary effort is normal.   Musculoskeletal:      Cervical back: Tenderness (Tenderness throughout the trapezius muscles bilaterally and symmetric.  Scalene tenderness as well which she states reproduces her symptoms.) present. No rigidity.   Neurological:      General: No focal deficit present.      Mental Status: She is alert. Mental status is at baseline.      Cranial Nerves: No cranial nerve deficit.      Sensory: No sensory deficit.      Motor: No weakness.      Gait: Gait normal.   Psychiatric:         Mood and Affect: Mood normal.             Labs & Imaging         Labs Ordered and Resulted from Time of ED Arrival to Time of ED Departure   CRP INFLAMMATION - Abnormal       Result Value    CRP Inflammation 7.64 (*)    ROUTINE UA WITH MICROSCOPIC REFLEX TO CULTURE - Abnormal    Color Urine Colorless      Appearance Urine Clear      Glucose Urine Negative      Bilirubin Urine Negative      Ketones Urine Negative      Specific Gravity Urine 1.003      Blood Urine Negative      pH Urine 7.5 (*)     Protein Albumin Urine Negative      Urobilinogen Urine <2.0      Nitrite Urine Negative      Leukocyte Esterase Urine Negative      Bacteria Urine Few (*)     RBC Urine 1      WBC Urine 1      Squamous Epithelials Urine 3 (*)    COMPREHENSIVE METABOLIC PANEL - Normal    Sodium 140      Potassium 3.9      Carbon Dioxide (CO2) 22      Anion Gap 15      Urea Nitrogen 9.0      Creatinine 0.65      GFR Estimate >90      Calcium 9.0      Chloride 103      Glucose 97      Alkaline Phosphatase 63      AST 17      ALT 13      Protein Total 7.7      Albumin 4.5      Bilirubin Total 0.3     LACTIC ACID WHOLE BLOOD WITH 1X REPEAT IN 2 HR WHEN >2 - Normal    Lactic Acid, Initial 0.9     ERYTHROCYTE SEDIMENTATION RATE AUTO - Normal    Erythrocyte Sedimentation Rate 12     INFLUENZA A/B, RSV, & SARS-COV2 PCR - Normal    Influenza A PCR Negative      Influenza B PCR Negative      RSV PCR  Negative      SARS CoV2 PCR Negative     CK TOTAL - Normal    CK 56     CBC WITH PLATELETS AND DIFFERENTIAL    WBC Count 7.8      RBC Count 4.60      Hemoglobin 13.2      Hematocrit 39.4      MCV 86      MCH 28.7      MCHC 33.5      RDW 12.6      Platelet Count 300      % Neutrophils 66      % Lymphocytes 26      % Monocytes 7      % Eosinophils 1      % Basophils 0      % Immature Granulocytes 0      NRBCs per 100 WBC 0      Absolute Neutrophils 5.2      Absolute Lymphocytes 2.0      Absolute Monocytes 0.5      Absolute Eosinophils 0.0      Absolute Basophils 0.0      Absolute Immature Granulocytes 0.0      Absolute NRBCs 0.0           Results for orders placed or performed during the hospital encounter of 10/19/24   XR Chest Port 1 View    Impression    IMPRESSION: Negative chest.   Comprehensive metabolic panel   Result Value Ref Range    Sodium 140 135 - 145 mmol/L    Potassium 3.9 3.4 - 5.3 mmol/L    Carbon Dioxide (CO2) 22 22 - 29 mmol/L    Anion Gap 15 7 - 15 mmol/L    Urea Nitrogen 9.0 6.0 - 20.0 mg/dL    Creatinine 0.65 0.51 - 0.95 mg/dL    GFR Estimate >90 >60 mL/min/1.73m2    Calcium 9.0 8.8 - 10.4 mg/dL    Chloride 103 98 - 107 mmol/L    Glucose 97 70 - 99 mg/dL    Alkaline Phosphatase 63 40 - 150 U/L    AST 17 0 - 45 U/L    ALT 13 0 - 50 U/L    Protein Total 7.7 6.4 - 8.3 g/dL    Albumin 4.5 3.5 - 5.2 g/dL    Bilirubin Total 0.3 <=1.2 mg/dL   Lactic acid whole blood with 1x repeat in 2 hr when >2   Result Value Ref Range    Lactic Acid, Initial 0.9 0.7 - 2.0 mmol/L   Result Value Ref Range    CRP Inflammation 7.64 (H) <5.00 mg/L   Erythrocyte sedimentation rate auto   Result Value Ref Range    Erythrocyte Sedimentation Rate 12 0 - 20 mm/hr   Symptomatic Influenza A/B, RSV, & SARS-CoV2 PCR (COVID-19) Nasopharyngeal    Specimen: Nasopharyngeal; Swab   Result Value Ref Range    Influenza A PCR Negative Negative    Influenza B PCR Negative Negative    RSV PCR Negative Negative    SARS CoV2 PCR Negative  Negative   UA with Microscopic reflex to Culture    Specimen: Urine, Midstream   Result Value Ref Range    Color Urine Colorless Colorless, Straw, Light Yellow, Yellow    Appearance Urine Clear Clear    Glucose Urine Negative Negative mg/dL    Bilirubin Urine Negative Negative    Ketones Urine Negative Negative mg/dL    Specific Gravity Urine 1.003 1.001 - 1.030    Blood Urine Negative Negative    pH Urine 7.5 (H) 5.0 - 7.0    Protein Albumin Urine Negative Negative mg/dL    Urobilinogen Urine <2.0 <2.0 mg/dL    Nitrite Urine Negative Negative    Leukocyte Esterase Urine Negative Negative    Bacteria Urine Few (A) None Seen /HPF    RBC Urine 1 <=2 /HPF    WBC Urine 1 <=5 /HPF    Squamous Epithelials Urine 3 (H) <=1 /HPF   Result Value Ref Range    CK 56 26 - 192 U/L   CBC with platelets and differential   Result Value Ref Range    WBC Count 7.8 4.0 - 11.0 10e3/uL    RBC Count 4.60 3.80 - 5.20 10e6/uL    Hemoglobin 13.2 11.7 - 15.7 g/dL    Hematocrit 39.4 35.0 - 47.0 %    MCV 86 78 - 100 fL    MCH 28.7 26.5 - 33.0 pg    MCHC 33.5 31.5 - 36.5 g/dL    RDW 12.6 10.0 - 15.0 %    Platelet Count 300 150 - 450 10e3/uL    % Neutrophils 66 %    % Lymphocytes 26 %    % Monocytes 7 %    % Eosinophils 1 %    % Basophils 0 %    % Immature Granulocytes 0 %    NRBCs per 100 WBC 0 <1 /100    Absolute Neutrophils 5.2 1.6 - 8.3 10e3/uL    Absolute Lymphocytes 2.0 0.8 - 5.3 10e3/uL    Absolute Monocytes 0.5 0.0 - 1.3 10e3/uL    Absolute Eosinophils 0.0 0.0 - 0.7 10e3/uL    Absolute Basophils 0.0 0.0 - 0.2 10e3/uL    Absolute Immature Granulocytes 0.0 <=0.4 10e3/uL    Absolute NRBCs 0.0 10e3/uL       Dillon Dozier MD  Emergency Medicine  [facility] EMERGENCY ROOM  [address] [facility] [id number] [phone]  10/19/2024        Dillon Dozier MD  10/19/24 2763

## 2024-10-19 NOTE — ED NOTES
Pt roomed in ed bed 13- vitals taken and all pertinent assessments completed and filed.   Bridget Ackerman RN

## 2024-10-19 NOTE — ED TRIAGE NOTES
Patient presents to ED with complaint of headache, stiff neck, and intermittent nausea and vomiting for a few days. Also states she had a rash that started on Tuesday on her arm and torso. History of migraines but headache and neck pain feels different than her normal.      Triage Assessment (Adult)       Row Name 10/19/24 1120          Triage Assessment    Airway WDL WDL        Respiratory WDL    Respiratory WDL WDL        Skin Circulation/Temperature WDL    Skin Circulation/Temperature WDL WDL        Cardiac WDL    Cardiac WDL WDL        Peripheral/Neurovascular WDL    Peripheral Neurovascular WDL WDL        Cognitive/Neuro/Behavioral WDL    Cognitive/Neuro/Behavioral WDL WDL

## 2024-10-19 NOTE — TELEPHONE ENCOUNTER
"  Nurse Triage SBAR    Is this a 2nd Level Triage? NO    Situation:   Headache, Nausea, Stiff Neck    Background:   History migraine and TMJ.  Current symptoms x 48 hours:  - headache, nausea  - stiff neck  - green nasal phlegm  - mild sore throat  - one episode vomiting at onset of sxs  - fatigue  No covid test used.    Assessment:   Pt reports headache is \"different\" from usual migraine.  Head pain located occipital area and temples.  \"Neck is pretty stiff.\"  Pt expresses concern re meningitis.    Protocol Recommended Disposition:   Go to ED Now    Recommendation:   Go to ED per triage disposition.  Rule out meningitis per pt's concern.  Advised another adult should drive, such as neighbor, or call for uber.  Pt verbalizes understanding.  Agrees to plan.  States preference for [facility].    Reason for Disposition   [1] Stiff neck (can't put chin to chest) AND [2] headache    Additional Information   Negative: Shock suspected (e.g., cold/pale/clammy skin, too weak to stand, low BP, rapid pulse)   Negative: Difficult to awaken or acting confused (e.g., disoriented, slurred speech)   Negative: [1] Similar pain previously AND [2] it was from \"heart attack\"   Negative: [1] Similar pain previously AND [2] it was from \"angina\" AND [3] not relieved by nitroglycerin   Negative: Sounds like a life-threatening emergency to the triager   Negative: Followed a neck injury (e.g., MVA, sports, impact or collision)   Negative: Chest pain   Negative: Lymph node in the neck is swollen or painful to the touch   Negative: Sore throat is main symptom   Negative: Difficulty breathing or unusual sweating (e.g., sweating without exertion)    Protocols used: Neck Pain or [id number]    "

## 2024-11-03 SDOH — HEALTH STABILITY: PHYSICAL HEALTH: ON AVERAGE, HOW MANY MINUTES DO YOU ENGAGE IN EXERCISE AT THIS LEVEL?: 30 MIN

## 2024-11-03 SDOH — HEALTH STABILITY: PHYSICAL HEALTH: ON AVERAGE, HOW MANY DAYS PER WEEK DO YOU ENGAGE IN MODERATE TO STRENUOUS EXERCISE (LIKE A BRISK WALK)?: 3 DAYS

## 2024-11-03 ASSESSMENT — SOCIAL DETERMINANTS OF HEALTH (SDOH): HOW OFTEN DO YOU GET TOGETHER WITH FRIENDS OR RELATIVES?: TWICE A WEEK

## 2024-11-04 ENCOUNTER — OFFICE VISIT (OUTPATIENT)
Dept: FAMILY MEDICINE | Facility: CLINIC | Age: 44
End: 2024-11-04
Payer: COMMERCIAL

## 2024-11-04 ENCOUNTER — ANCILLARY PROCEDURE (OUTPATIENT)
Dept: MAMMOGRAPHY | Facility: CLINIC | Age: 44
End: 2024-11-04
Attending: FAMILY MEDICINE
Payer: COMMERCIAL

## 2024-11-04 VITALS
HEART RATE: 83 BPM | DIASTOLIC BLOOD PRESSURE: 80 MMHG | HEIGHT: 66 IN | TEMPERATURE: 97.4 F | WEIGHT: 189 LBS | RESPIRATION RATE: 16 BRPM | SYSTOLIC BLOOD PRESSURE: 136 MMHG | BODY MASS INDEX: 30.37 KG/M2 | OXYGEN SATURATION: 99 %

## 2024-11-04 DIAGNOSIS — J45.20 MILD INTERMITTENT ASTHMA WITHOUT COMPLICATION: ICD-10-CM

## 2024-11-04 DIAGNOSIS — Z00.00 ROUTINE GENERAL MEDICAL EXAMINATION AT A HEALTH CARE FACILITY: Primary | ICD-10-CM

## 2024-11-04 DIAGNOSIS — Z12.31 VISIT FOR SCREENING MAMMOGRAM: ICD-10-CM

## 2024-11-04 DIAGNOSIS — F41.1 GENERALIZED ANXIETY DISORDER: ICD-10-CM

## 2024-11-04 DIAGNOSIS — G57.13 MERALGIA PARESTHETICA, BILATERAL LOWER LIMBS: ICD-10-CM

## 2024-11-04 DIAGNOSIS — F33.0 MILD EPISODE OF RECURRENT MAJOR DEPRESSIVE DISORDER (H): ICD-10-CM

## 2024-11-04 DIAGNOSIS — Z11.59 NEED FOR HEPATITIS C SCREENING TEST: ICD-10-CM

## 2024-11-04 DIAGNOSIS — J30.89 CHRONIC NONSEASONAL ALLERGIC RHINITIS DUE TO POLLEN: ICD-10-CM

## 2024-11-04 DIAGNOSIS — T75.3XXA MOTION SICKNESS, INITIAL ENCOUNTER: ICD-10-CM

## 2024-11-04 PROBLEM — F33.42 MAJOR DEPRESSIVE DISORDER, RECURRENT EPISODE, IN FULL REMISSION (H): Status: RESOLVED | Noted: 2021-11-19 | Resolved: 2024-11-04

## 2024-11-04 PROCEDURE — 99396 PREV VISIT EST AGE 40-64: CPT | Mod: 25 | Performed by: FAMILY MEDICINE

## 2024-11-04 PROCEDURE — 90480 ADMN SARSCOV2 VAC 1/ONLY CMP: CPT | Performed by: FAMILY MEDICINE

## 2024-11-04 PROCEDURE — 90472 IMMUNIZATION ADMIN EACH ADD: CPT | Performed by: FAMILY MEDICINE

## 2024-11-04 PROCEDURE — 90471 IMMUNIZATION ADMIN: CPT | Performed by: FAMILY MEDICINE

## 2024-11-04 PROCEDURE — 90715 TDAP VACCINE 7 YRS/> IM: CPT | Performed by: FAMILY MEDICINE

## 2024-11-04 PROCEDURE — 77067 SCR MAMMO BI INCL CAD: CPT | Mod: TC | Performed by: RADIOLOGY

## 2024-11-04 PROCEDURE — 99214 OFFICE O/P EST MOD 30 MIN: CPT | Mod: 25 | Performed by: FAMILY MEDICINE

## 2024-11-04 PROCEDURE — 91320 SARSCV2 VAC 30MCG TRS-SUC IM: CPT | Performed by: FAMILY MEDICINE

## 2024-11-04 PROCEDURE — 77063 BREAST TOMOSYNTHESIS BI: CPT | Mod: TC | Performed by: RADIOLOGY

## 2024-11-04 PROCEDURE — 90656 IIV3 VACC NO PRSV 0.5 ML IM: CPT | Performed by: FAMILY MEDICINE

## 2024-11-04 RX ORDER — GABAPENTIN 100 MG/1
100 CAPSULE ORAL 3 TIMES DAILY PRN
Qty: 30 CAPSULE | Refills: 11 | Status: SHIPPED | OUTPATIENT
Start: 2024-11-04

## 2024-11-04 RX ORDER — BUSPIRONE HYDROCHLORIDE 5 MG/1
5 TABLET ORAL 2 TIMES DAILY
Qty: 180 TABLET | Refills: 4 | Status: SHIPPED | OUTPATIENT
Start: 2024-11-04

## 2024-11-04 RX ORDER — MONTELUKAST SODIUM 10 MG/1
1 TABLET ORAL AT BEDTIME
Qty: 90 TABLET | Refills: 4 | Status: SHIPPED | OUTPATIENT
Start: 2024-11-04

## 2024-11-04 RX ORDER — FLUTICASONE FUROATE 200 UG/1
1 POWDER RESPIRATORY (INHALATION) DAILY
Qty: 90 EACH | Refills: 1 | Status: SHIPPED | OUTPATIENT
Start: 2024-11-04

## 2024-11-04 RX ORDER — FEXOFENADINE HCL 180 MG/1
180 TABLET ORAL DAILY
Qty: 90 TABLET | Refills: 4 | Status: SHIPPED | OUTPATIENT
Start: 2024-11-04

## 2024-11-04 RX ORDER — ALBUTEROL SULFATE 90 UG/1
2 INHALANT RESPIRATORY (INHALATION) EVERY 4 HOURS PRN
Qty: 18 G | Refills: 0 | Status: SHIPPED | OUTPATIENT
Start: 2024-11-04

## 2024-11-04 RX ORDER — LACTOBACILLUS RHAMNOSUS GG 10B CELL
1 CAPSULE ORAL DAILY
Qty: 100 CAPSULE | Refills: 4 | Status: SHIPPED | OUTPATIENT
Start: 2024-11-04

## 2024-11-04 RX ORDER — FLUTICASONE PROPIONATE 50 MCG
2 SPRAY, SUSPENSION (ML) NASAL DAILY
Qty: 16 G | Refills: 4 | Status: SHIPPED | OUTPATIENT
Start: 2024-11-04

## 2024-11-04 RX ORDER — ACETAMINOPHEN 500 MG
1000 TABLET ORAL EVERY 6 HOURS PRN
Qty: 200 TABLET | Refills: 4 | Status: SHIPPED | OUTPATIENT
Start: 2024-11-04

## 2024-11-04 RX ORDER — SCOLOPAMINE TRANSDERMAL SYSTEM 1 MG/1
1 PATCH, EXTENDED RELEASE TRANSDERMAL
Qty: 10 PATCH | Refills: 0 | Status: SHIPPED | OUTPATIENT
Start: 2024-11-04

## 2024-11-04 RX ORDER — HYDROXYZINE PAMOATE 50 MG/1
50 CAPSULE ORAL
Qty: 30 CAPSULE | Refills: 11 | Status: SHIPPED | OUTPATIENT
Start: 2024-11-04

## 2024-11-04 ASSESSMENT — ASTHMA QUESTIONNAIRES
QUESTION_5 LAST FOUR WEEKS HOW WOULD YOU RATE YOUR ASTHMA CONTROL: WELL CONTROLLED
ACT_TOTALSCORE: 22
ACT_TOTALSCORE: 22
QUESTION_3 LAST FOUR WEEKS HOW OFTEN DID YOUR ASTHMA SYMPTOMS (WHEEZING, COUGHING, SHORTNESS OF BREATH, CHEST TIGHTNESS OR PAIN) WAKE YOU UP AT NIGHT OR EARLIER THAN USUAL IN THE MORNING: NOT AT ALL
QUESTION_2 LAST FOUR WEEKS HOW OFTEN HAVE YOU HAD SHORTNESS OF BREATH: ONCE OR TWICE A WEEK
QUESTION_1 LAST FOUR WEEKS HOW MUCH OF THE TIME DID YOUR ASTHMA KEEP YOU FROM GETTING AS MUCH DONE AT WORK, SCHOOL OR AT HOME: NONE OF THE TIME
QUESTION_4 LAST FOUR WEEKS HOW OFTEN HAVE YOU USED YOUR RESCUE INHALER OR NEBULIZER MEDICATION (SUCH AS ALBUTEROL): ONCE A WEEK OR LESS

## 2024-11-04 NOTE — PROGRESS NOTES
Preventive Care Visit  [facility]  Amira Kevin MD, Family Medicine  Nov 4, 2024  {Provider  Link to SmartSet :836777}    {PROVIDER CHARTING PREFERENCE:610646}    Billie Arthur is a 44 year old, presenting for the following:  Physical        11/4/2024     9:17 AM   Additional Questions   Roomed by hser   Accompanied by self          HPI    Weight gain  Stress  Doing 40 day Yoga Challenge  No depression, more anxiety  Sleep is good    Health Care Directive  Patient does not have a Health Care Directive: {ADVANCE_DIRECTIVE_STATUS:867171}      11/3/2024   General Health   How would you rate your overall physical health? Good   Feel stress (tense, anxious, or unable to sleep) Only a little      (!) STRESS CONCERN      11/3/2024   Nutrition   Three or more servings of calcium each day? Yes   Diet: Regular (no restrictions)   How many servings of fruit and vegetables per day? 4 or more   How many sweetened beverages each day? 0-1            11/3/2024   Exercise   Days per week of moderate/strenous exercise 3 days   Average minutes spent exercising at this level 30 min            11/3/2024   Social Factors   Frequency of gathering with friends or relatives Twice a week   Worry food won't last until get money to buy more No   Food not last or not have enough money for food? No   Do you have housing? (Housing is defined as stable permanent housing and does not include staying ouside in a car, in a tent, in an abandoned building, in an overnight shelter, or couch-surfing.) Yes   Are you worried about losing your housing? No   Lack of transportation? No   Unable to get utilities (heat,electricity)? No            11/3/2024   Dental   Dentist two times every year? Yes             { Rooming Staff Patient needs a PHQ as part of the AWV.  Use this link to complete and then refresh the note to pull results Link to PHQ9 Assessment :671160}  {USE TO PULL IN PHQ RESULTS FOR TODAY:678492}         11/3/2024   Substance Use   Alcohol more than 3/day or more than 7/wk Not Applicable   Do you use any other substances recreationally? (!) CANNABIS PRODUCTS        Social History     Tobacco Use     Smoking status: Never     Passive exposure: Never     Smokeless tobacco: Never   Vaping Use     Vaping status: Never Used   Substance Use Topics     Alcohol use: Not Currently     Comment: Very occasional     Drug use: Yes     Types: Marijuana     Comment: Occasional THC beverages     {Provider  If there are gaps in the social history shown above, please follow the link to update and then refresh the note Link to Social and Substance History :463145}      9/15/2023   LAST FHS-7 RESULTS   1st degree relative breast or ovarian cancer No    No   Any relative bilateral breast cancer No    No   Any male have breast cancer No    No   Any ONE woman have BOTH breast AND ovarian cancer No    No   Any woman with breast cancer before 50yrs No    No   2 or more relatives with breast AND/OR ovarian cancer Yes    Yes   2 or more relatives with breast AND/OR bowel cancer Yes    No       Multiple values from one day are sorted in reverse-chronological order     {If any of the questions to the S7 are answered yes, consider referral for genetic counseling.    Additional indications for genetic referral include personal history of breast or ovarian cancer, genetic mutation in 1st degree relative which increases risk of breast cancer including BRCA1, BRCA2, NAHUM, PALB 2, TP53, CHEK2, PTEN, CDH1, STK11 (per ACS) and/or 1st degree relative with history of pancreatic or high-risk prostate cancer (per NCCN):473909}   {Mammogram Decision Support (Optional):652163}        11/3/2024   STI Screening   New sexual partner(s) since last STI/HIV test? No        History of abnormal Pap smear: { :393831}        Latest Ref Rng & Units 9/24/2020    12:00 PM 5/10/2019    11:12 AM 12/1/2017     9:51 AM   PAP / HPV   PAP Negative for squamous  "intraepithelial lesion or malignancy. Negative for squamous intraepithelial lesion or malignancy  Electronically signed by Claudio Diane CT (ASCP) on 10/5/2020 at  1:02 PM    Negative for squamous intraepithelial lesion or malignancy  Electronically signed by Claudio Diane CT (ASCP) on 5/20/2019 at  2:34 PM    Negative for squamous intraepithelial lesion or malignancy  Electronically signed by Beverly Meng CT (ASCP) on 12/9/2017 at  6:09 PM      HPV 16 DNA NEG Negative  Negative     HPV 18 DNA NEG Negative  Negative     Other HR HPV NEG Negative  Negative       ASCVD Risk   The 10-year ASCVD risk score (Jamilah MCCLELLAN, et al., 2019) is: 0.4%    Values used to calculate the score:      Age: 44 years      Sex: Female      Is Non- : No      Diabetic: No      Tobacco smoker: No      Systolic Blood Pressure: 136 mmHg      Is BP treated: No      HDL Cholesterol: 85 mg/dL      Total Cholesterol: 193 mg/dL    {Provider  REQUIRED FOR AWV Use the storyboard to review patient history, after sections have been marked as reviewed, refresh note to capture documentation:057932}   Reviewed and updated as needed this visit by Provider      Problems               {HISTORY OPTIONS (Optional):347622}    {ROS Picklists (Optional):577380}     Objective    Exam  /80 (BP Location: Left arm, Patient Position: Sitting, Cuff Size: Adult Regular)   Pulse 83   Temp 97.4  F (36.3  C) (Temporal)   Resp 16   Ht 1.676 m (5' 6\")   Wt 85.7 kg (189 lb)   LMP  (LMP Unknown)   SpO2 99%   BMI 30.51 kg/m     Estimated body mass index is 30.51 kg/m  as calculated from the following:    Height as of this encounter: 1.676 m (5' 6\").    Weight as of this encounter: 85.7 kg (189 lb).  BP Readings from Last 6 Encounters:   11/04/24 136/80   10/19/24 130/87   10/03/23 126/81   09/15/23 130/78   03/21/22 135/82   03/20/22 133/80     Physical Exam  {Exam Choices (Optional):750143}        Signed " Electronically by: Amira Kevin MD  {Email feedback regarding this note to [email or web address]   :207325}    Prior to immunization administration, verified patients identity using patient s name and date of birth. Please see Immunization Activity for additional information.     Screening Questionnaire for Adult Immunization    Are you sick today?   No   Do you have allergies to medications, food, a vaccine component or latex?   Yes   Have you ever had a serious reaction after receiving a vaccination?   No   Do you have a long-term health problem with heart, lung, kidney, or metabolic disease (e.g., diabetes), asthma, a blood disorder, no spleen, complement component deficiency, a cochlear implant, or a spinal fluid leak?  Are you on long-term aspirin therapy?   No   Do you have cancer, leukemia, HIV/AIDS, or any other immune system problem?   No   Do you have a parent, brother, or sister with an immune system problem?   No   In the past 3 months, have you taken medications that affect  your immune system, such as prednisone, other steroids, or anticancer drugs; drugs for the treatment of rheumatoid arthritis, Crohn s disease, or psoriasis; or have you had radiation treatments?   No   Have you had a seizure, or a brain or other nervous system problem?   No   During the past year, have you received a transfusion of blood or blood    products, or been given immune (gamma) globulin or antiviral drug?   No   For women: Are you pregnant or is there a chance you could become       pregnant during the next month?   No   Have you received any vaccinations in the past 4 weeks?   No     Immunization questionnaire was positive for at least one answer.  Notified provider.      Patient instructed to remain in clinic for 15 minutes afterwards, and to report any adverse reactions.     Screening performed by Sulma Rojas MA on 11/4/2024 at 9:24 AM.        and no palpable axillary masses or adenopathy  CV: regular rate and rhythm, normal S1 S2, no S3 or S4, no murmur, click or rub, no peripheral edema  ABDOMEN: soft, nontender, no hepatosplenomegaly, no masses and bowel sounds normal  MS: no gross musculoskeletal defects noted, no edema  SKIN: no suspicious lesions or rashes  NEURO: Normal strength and tone, mentation intact and speech normal  PSYCH: mentation appears normal, affect normal/bright        Signed Electronically by: Amira Kevin MD      Prior to immunization administration, verified patients identity using patient s name and date of birth. Please see Immunization Activity for additional information.     Screening Questionnaire for Adult Immunization    Are you sick today?   No   Do you have allergies to medications, food, a vaccine component or latex?   Yes   Have you ever had a serious reaction after receiving a vaccination?   No   Do you have a long-term health problem with heart, lung, kidney, or metabolic disease (e.g., diabetes), asthma, a blood disorder, no spleen, complement component deficiency, a cochlear implant, or a spinal fluid leak?  Are you on long-term aspirin therapy?   No   Do you have cancer, leukemia, HIV/AIDS, or any other immune system problem?   No   Do you have a parent, brother, or sister with an immune system problem?   No   In the past 3 months, have you taken medications that affect  your immune system, such as prednisone, other steroids, or anticancer drugs; drugs for the treatment of rheumatoid arthritis, Crohn s disease, or psoriasis; or have you had radiation treatments?   No   Have you had a seizure, or a brain or other nervous system problem?   No   During the past year, have you received a transfusion of blood or blood    products, or been given immune (gamma) globulin or antiviral drug?   No   For women: Are you pregnant or is there a chance you could become       pregnant during the next month?   No   Have you  received any vaccinations in the past 4 weeks?   No     Immunization questionnaire was positive for at least one answer.  Notified provider.      Patient instructed to remain in clinic for 15 minutes afterwards, and to report any adverse reactions.     Screening performed by Sulma Rojas MA on 11/4/2024 at 9:24 AM.

## 2024-11-04 NOTE — PATIENT INSTRUCTIONS
High Blood Pressure = Hypertension  Blood pressure goal is less than 140/90.  BP Readings from Last 3 Encounters:   11/04/24 136/80   10/19/24 130/87   10/03/23 126/81      To lower blood pressure:  Eat 5 servings of fruit + veggies every day.  Eat low salt diet.  Exercise 30 minutes, most days.  Lose 5-10% of your weight.       Patient Education   Preventive Care Advice   This is general advice given by our system to help you stay healthy. However, your care team may have specific advice just for you. Please talk to your care team about your preventive care needs.  Nutrition  Eat 5 or more servings of fruits and vegetables each day.  Try wheat bread, brown rice and whole grain pasta (instead of white bread, rice, and pasta).  Get enough calcium and vitamin D. Check the label on foods and aim for 100% of the RDA (recommended daily allowance).  Lifestyle  Exercise at least 150 minutes each week  (30 minutes a day, 5 days a week).  Do muscle strengthening activities 2 days a week. These help control your weight and prevent disease.  No smoking.  Wear sunscreen to prevent skin cancer.  Have a dental exam and cleaning every 6 months.  Yearly exams  See your health care team every year to talk about:  Any changes in your health.  Any medicines your care team has prescribed.  Preventive care, family planning, and ways to prevent chronic diseases.  Shots (vaccines)   HPV shots (up to age 26), if you've never had them before.  Hepatitis B shots (up to age 59), if you've never had them before.  COVID-19 shot: Get this shot when it's due.  Flu shot: Get a flu shot every year.  Tetanus shot: Get a tetanus shot every 10 years.  Pneumococcal, hepatitis A, and RSV shots: Ask your care team if you need these based on your risk.  Shingles shot (for age 50 and up)  General health tests  Diabetes screening:  Starting at age 35, Get screened for diabetes at least every 3 years.  If you are younger than age 35, ask your care team if  you should be screened for diabetes.  Cholesterol test: At age 39, start having a cholesterol test every 5 years, or more often if advised.  Bone density scan (DEXA): At age 50, ask your care team if you should have this scan for osteoporosis (brittle bones).  Hepatitis C: Get tested at least once in your life.  STIs (sexually transmitted infections)  Before age 24: Ask your care team if you should be screened for STIs.  After age 24: Get screened for STIs if you're at risk. You are at risk for STIs (including HIV) if:  You are sexually active with more than one person.  You don't use condoms every time.  You or a partner was diagnosed with a sexually transmitted infection.  If you are at risk for HIV, ask about PrEP medicine to prevent HIV.  Get tested for HIV at least once in your life, whether you are at risk for HIV or not.  Cancer screening tests  Cervical cancer screening: If you have a cervix, begin getting regular cervical cancer screening tests starting at age 21.  Breast cancer scan (mammogram): If you've ever had breasts, begin having regular mammograms starting at age 40. This is a scan to check for breast cancer.  Colon cancer screening: It is important to start screening for colon cancer at age 45.  Have a colonoscopy test every 10 years (or more often if you're at risk) Or, ask your provider about stool tests like a FIT test every year or Cologuard test every 3 years.  To learn more about your testing options, visit:   .  For help making a decision, visit:   https://bit.ly/kd32727.  Prostate cancer screening test: If you have a prostate, ask your care team if a prostate cancer screening test (PSA) at age 55 is right for you.  Lung cancer screening: If you are a current or former smoker ages 50 to 80, ask your care team if ongoing lung cancer screenings are right for you.  For informational purposes only. Not to replace the advice of your health care provider. Copyright   2023 [employer].  All rights reserved. Clinically reviewed by the [facility]. [employer] 194099 - REV 01/24.  Substance Use Disorder: Care Instructions  Overview     You can improve your life and health by stopping your use of alcohol or drugs. When you don't drink or use drugs, you may feel and sleep better. You may get along better with your family, friends, and coworkers. There are medicines and programs that can help with substance use disorder.  How can you care for yourself at home?  Here are some ways to help you stay sober and prevent relapse.  If you have been given medicine to help keep you sober or reduce your cravings, be sure to take it exactly as prescribed.  Talk to your doctor about programs that can help you stop using drugs or drinking alcohol.  Do not keep alcohol or drugs in your home.  Plan ahead. Think about what you'll say if other people ask you to drink or use drugs. Try not to spend time with people who drink or use drugs.  Use the time and money spent on drinking or drugs to do something that's important to you.  Preventing a relapse  Have a plan to deal with relapse. Learn to recognize changes in your thinking that lead you to drink or use drugs. Get help before you start to drink or use drugs again.  Try to stay away from situations, friends, or places that may lead you to drink or use drugs.  If you feel the need to drink alcohol or use drugs again, seek help right away. Call a trusted friend or family member. Some people get support from organizations such as Narcotics Anonymous or [employer] or from treatment facilities.  If you relapse, get help as soon as you can. Some people make a plan with another person that outlines what they want that person to do for them if they relapse. The plan usually includes how to handle the relapse and who to notify in case of relapse.  Don't give up. Remember that a relapse doesn't mean that you have failed. Use the experience to learn  "the triggers that lead you to drink or use drugs. Then quit again. Recovery is a lifelong process. Many people have several relapses before they are able to quit for good.  Follow-up care is a key part of your treatment and safety. Be sure to make and go to all appointments, and call your doctor if you are having problems. It's also a good idea to know your test results and keep a list of the medicines you take.  When should you call for help?   Call 911  anytime you think you may need emergency care. For example, call if you or someone else:    Has overdosed or has withdrawal signs. Be sure to tell the emergency workers that you are or someone else is using or trying to quit using drugs. Overdose or withdrawal signs may include:  Losing consciousness.  Seizure.  Seeing or hearing things that aren't there (hallucinations).     Is thinking or talking about suicide or harming others.   Where to get help 24 hours a day, 7 days a week   If you or someone you know talks about suicide, self-harm, a mental health crisis, a substance use crisis, or any other kind of emotional distress, get help right away. You can:    Call the Suicide and Crisis Lifeline at 988.     Call 9-874-889-TALK (1-886.963.7556).     Text HOME to 619854 to access the Crisis Text Line.   Consider saving these numbers in your phone.  Go to [employer] for more information or to chat online.  Call your doctor now or seek immediate medical care if:    You are having withdrawal symptoms. These may include nausea or vomiting, sweating, shakiness, and anxiety.   Watch closely for changes in your health, and be sure to contact your doctor if:    You have a relapse.     You need more help or support to stop.   Where can you learn more?  Go to https://www.[employer].net/patiented  Enter H573 in the search box to learn more about \"Substance Use Disorder: Care Instructions.\"  Current as of: November 15, 2023  Content Version: 14.2 2024 [employer]   Care instructions adapted under license by your healthcare professional. If you have questions about a medical condition or this instruction, always ask your healthcare professional. [employer] disclaims any warranty or liability for your use of this information.

## 2025-01-03 ENCOUNTER — APPOINTMENT (OUTPATIENT)
Dept: CT IMAGING | Facility: CLINIC | Age: 45
End: 2025-01-03
Attending: EMERGENCY MEDICINE
Payer: COMMERCIAL

## 2025-01-03 ENCOUNTER — HOSPITAL ENCOUNTER (EMERGENCY)
Facility: CLINIC | Age: 45
Discharge: HOME | End: 2025-01-03
Attending: EMERGENCY MEDICINE | Admitting: EMERGENCY MEDICINE
Payer: COMMERCIAL

## 2025-01-03 VITALS
OXYGEN SATURATION: 100 % | DIASTOLIC BLOOD PRESSURE: 76 MMHG | HEART RATE: 92 BPM | TEMPERATURE: 98.5 F | HEIGHT: 66 IN | WEIGHT: 185 LBS | RESPIRATION RATE: 18 BRPM | SYSTOLIC BLOOD PRESSURE: 134 MMHG | BODY MASS INDEX: 29.73 KG/M2

## 2025-01-03 DIAGNOSIS — R04.2 HEMOPTYSIS: ICD-10-CM

## 2025-01-03 DIAGNOSIS — J06.9 VIRAL URI WITH COUGH: ICD-10-CM

## 2025-01-03 LAB
ALBUMIN SERPL BCG-MCNC: 4.2 G/DL (ref 3.5–5.2)
ALP SERPL-CCNC: 62 U/L (ref 40–150)
ALT SERPL W P-5'-P-CCNC: 21 U/L (ref 0–50)
ANION GAP SERPL CALCULATED.3IONS-SCNC: 11 MMOL/L (ref 7–15)
AST SERPL W P-5'-P-CCNC: 18 U/L (ref 0–45)
BASOPHILS # BLD AUTO: 0 10E3/UL (ref 0–0.2)
BASOPHILS NFR BLD AUTO: 0 %
BILIRUB SERPL-MCNC: 0.3 MG/DL
BUN SERPL-MCNC: 8.7 MG/DL (ref 6–20)
CALCIUM SERPL-MCNC: 8.9 MG/DL (ref 8.8–10.4)
CHLORIDE SERPL-SCNC: 104 MMOL/L (ref 98–107)
CREAT SERPL-MCNC: 0.7 MG/DL (ref 0.51–0.95)
D DIMER PPP FEU-MCNC: 0.86 UG/ML FEU (ref 0–0.5)
EGFRCR SERPLBLD CKD-EPI 2021: >90 ML/MIN/1.73M2
EOSINOPHIL # BLD AUTO: 0.1 10E3/UL (ref 0–0.7)
EOSINOPHIL NFR BLD AUTO: 1 %
ERYTHROCYTE [DISTWIDTH] IN BLOOD BY AUTOMATED COUNT: 13.2 % (ref 10–15)
GLUCOSE SERPL-MCNC: 111 MG/DL (ref 70–99)
HCO3 SERPL-SCNC: 21 MMOL/L (ref 22–29)
HCT VFR BLD AUTO: 36.3 % (ref 35–47)
HGB BLD-MCNC: 12.1 G/DL (ref 11.7–15.7)
IMM GRANULOCYTES # BLD: 0 10E3/UL
IMM GRANULOCYTES NFR BLD: 0 %
LYMPHOCYTES # BLD AUTO: 2.6 10E3/UL (ref 0.8–5.3)
LYMPHOCYTES NFR BLD AUTO: 32 %
MCH RBC QN AUTO: 28.2 PG (ref 26.5–33)
MCHC RBC AUTO-ENTMCNC: 33.3 G/DL (ref 31.5–36.5)
MCV RBC AUTO: 85 FL (ref 78–100)
MONOCYTES # BLD AUTO: 0.6 10E3/UL (ref 0–1.3)
MONOCYTES NFR BLD AUTO: 7 %
NEUTROPHILS # BLD AUTO: 4.8 10E3/UL (ref 1.6–8.3)
NEUTROPHILS NFR BLD AUTO: 60 %
NRBC # BLD AUTO: 0 10E3/UL
NRBC BLD AUTO-RTO: 0 /100
PLATELET # BLD AUTO: 271 10E3/UL (ref 150–450)
POTASSIUM SERPL-SCNC: 4.1 MMOL/L (ref 3.4–5.3)
PROT SERPL-MCNC: 7.3 G/DL (ref 6.4–8.3)
RBC # BLD AUTO: 4.29 10E6/UL (ref 3.8–5.2)
SODIUM SERPL-SCNC: 136 MMOL/L (ref 135–145)
WBC # BLD AUTO: 8.1 10E3/UL (ref 4–11)

## 2025-01-03 PROCEDURE — 36415 COLL VENOUS BLD VENIPUNCTURE: CPT | Performed by: EMERGENCY MEDICINE

## 2025-01-03 PROCEDURE — 85025 COMPLETE CBC W/AUTO DIFF WBC: CPT | Performed by: EMERGENCY MEDICINE

## 2025-01-03 PROCEDURE — 99285 EMERGENCY DEPT VISIT HI MDM: CPT | Mod: 25

## 2025-01-03 PROCEDURE — 71275 CT ANGIOGRAPHY CHEST: CPT

## 2025-01-03 PROCEDURE — 82040 ASSAY OF SERUM ALBUMIN: CPT | Performed by: EMERGENCY MEDICINE

## 2025-01-03 PROCEDURE — 250N000011 HC RX IP 250 OP 636: Performed by: EMERGENCY MEDICINE

## 2025-01-03 PROCEDURE — 85014 HEMATOCRIT: CPT | Performed by: EMERGENCY MEDICINE

## 2025-01-03 PROCEDURE — 85379 FIBRIN DEGRADATION QUANT: CPT | Performed by: EMERGENCY MEDICINE

## 2025-01-03 RX ORDER — IOPAMIDOL 755 MG/ML
75 INJECTION, SOLUTION INTRAVASCULAR ONCE
Status: COMPLETED | OUTPATIENT
Start: 2025-01-03 | End: 2025-01-03

## 2025-01-03 RX ORDER — BENZONATATE 100 MG/1
100-200 CAPSULE ORAL 3 TIMES DAILY PRN
Qty: 15 CAPSULE | Refills: 0 | Status: SHIPPED | OUTPATIENT
Start: 2025-01-03

## 2025-01-03 RX ADMIN — IOPAMIDOL 75 ML: 755 INJECTION, SOLUTION INTRAVENOUS at 09:08

## 2025-01-03 ASSESSMENT — ACTIVITIES OF DAILY LIVING (ADL)
ADLS_ACUITY_SCORE: 41

## 2025-01-03 ASSESSMENT — COLUMBIA-SUICIDE SEVERITY RATING SCALE - C-SSRS
1. IN THE PAST MONTH, HAVE YOU WISHED YOU WERE DEAD OR WISHED YOU COULD GO TO SLEEP AND NOT WAKE UP?: NO
2. HAVE YOU ACTUALLY HAD ANY THOUGHTS OF KILLING YOURSELF IN THE PAST MONTH?: NO
6. HAVE YOU EVER DONE ANYTHING, STARTED TO DO ANYTHING, OR PREPARED TO DO ANYTHING TO END YOUR LIFE?: NO

## 2025-01-03 NOTE — ED PROVIDER NOTES
EMERGENCY DEPARTMENT ENCOUNTER      NAME: Sandhya Caal  AGE: 44 year old female  YOB: 1980  MRN: [id number]  EVALUATION DATE & TIME: 1/3/2025  7:24 AM    PCP: Amira Kevin    ED PROVIDER: Yenni Marley DO      Chief Complaint   Patient presents with    Hemoptysis         FINAL IMPRESSION:  1. Hemoptysis    2. Viral URI with cough          ED COURSE & MEDICAL DECISION MAKING:    Pertinent Labs & Imaging studies reviewed. (See chart for details)  7:33 AM I met the patient and performed my initial interview and exam.  9:50 AM I met with the patient and updated them on the plan of care.    44 year old female presents to the Emergency Department for evaluation of hemoptysis.  Reports viral syndrome for around a week.  Starting to feel worse yesterday.  Cough with some dry heaving.  Ache in her right chest.  This morning spit up some bright red blood mixed with sputum.  No fevers.  She is nontoxic-appearing.  Slightly tachycardic on arrival, will obtain D-dimer to further evaluate for signs of PE.  Not consistent ACS.  No rash to chest wall to suggest shingles.  D-dimer is elevated, will obtain CT of the chest.  CMP, CBC otherwise unremarkable.  Do not think this represents cholecystitis or cholelithiasis.  CT of the chest unremarkable.  No evidence of PE, pleural effusion, pneumonia or other acute findings.  Noted hepatic steatosis but unlikely to be causing her symptoms.  Patient made hemodynamically stable.  Did discuss most likely secondary to viral URI/bronchitis.  Discussed symptomatic care for home including expectorants, humidified air, fluid hydration.  Discussed recommendations and return precautions.    At the conclusion of the encounter I discussed the results of all of the tests and the disposition. The questions were answered. The patient or family acknowledged understanding and was agreeable with the care plan.     Medical Decision Making  Obtained supplemental history:Supplemental  history obtained?: Documented in chart  Reviewed external records: External records reviewed?: Documented in chart  Care impacted by chronic illness:Documented in Chart  Did you consider but not order tests?: Work up considered but not performed and documented in chart, if applicable  Did you interpret images independently?: Independent interpretation of ECG and images noted in documentation, when applicable.  Consultation discussion with other provider:Did you involve another provider (consultant, , pharmacy, etc.)?: No  Discharge. I prescribed additional prescription strength medication(s) as charted. I considered admission, but ultimately discharged patient after reassuring work up.    MIPS: CT Pulmonary Angiogram:The patient had an abnormal d-dimer.      MEDICATIONS GIVEN IN THE EMERGENCY:  Medications   iopamidol (ISOVUE-370) solution 75 mL (75 mLs Intravenous $Given 1/3/25 0977)       NEW PRESCRIPTIONS STARTED AT TODAY'S ER VISIT  New Prescriptions    BENZONATATE (TESSALON) 100 MG CAPSULE    Take 1-2 capsules (100-200 mg) by mouth 3 times daily as needed for cough.          =================================================================    HPI    Patient information was obtained from: Patient.    Use of : N/A         Sandhya Caal is a 44 year old female with a pertinent history of Asthma, PVC, TMJ who presents to this ED via private car for evaluation of hemoptysis.    Patient states she has had a viral illness for approximately 1 week. Patient states she has fatigue, cough, and a headache. Patient states she was in the shower yesterday when she began to dry heave and vomit. Patient states today she spit in the sink and her mucus had a bright red bloody tint. Patient states her asthma has been slightly exacerbated and has needed her inhaler more. Patient states she has an ache in her side that began yesterday. Patient states she has a sore throat and ear pain. Of note, patient has been  taking mucinex, patient does not normally get a menstrual cycle, and had family contacts with colds.    Patient does not endorse recent surgery, long travel, blood clot history, urinary symptoms, or any other concerns at this time.       REVIEW OF SYSTEMS   Per HPI    PAST MEDICAL HISTORY:  Past Medical History:   Diagnosis Date    Arthritis 2018    Depressive disorder 1997    Dyshidrotic eczema 12/02/2016    Left 4th finger    Iron deficiency anemia 12/02/2016    Due to menorrhagia. Treated with endometrial ablation.    Major depressive disorder, recurrent episode (H)     Menorrhagia 12/02/2016    Treated with endometrial ablation.    Uncomplicated asthma 1995       PAST SURGICAL HISTORY:  Past Surgical History:   Procedure Laterality Date    ENDOMETRIAL ABLATION  12/2018    ENT SURGERY  2006    SINUS SURGERY Right 02/01/2006    postoperative nausea + vomiting. Postop pain severe.    TUBAL LIGATION  12/01/2016    with lysis of adhesions. Dr. Iman Condon OB/G[facility]           CURRENT MEDICATIONS:    benzonatate (TESSALON) 100 MG capsule  acetaminophen (TYLENOL) 500 MG tablet  albuterol (PROAIR HFA/PROVENTIL HFA/VENTOLIN HFA) 108 (90 Base) MCG/ACT inhaler  busPIRone (BUSPAR) 5 MG tablet  cholecalciferol 50 MCG (2000 UT) CAPS  fexofenadine (ALLEGRA) 180 MG tablet  fluticasone (ARNUITY ELLIPTA) 200 MCG/ACT inhaler  fluticasone (FLONASE) 50 MCG/ACT nasal spray  gabapentin (NEURONTIN) 100 MG capsule  hydrOXYzine (VISTARIL) 50 MG capsule  Lactobacillus Rhamnosus, GG, ([facility] [place] [facility] [employer]) CAPS  montelukast (SINGULAIR) 10 MG tablet  scopolamine (TRANSDERM) 1 MG/3DAYS 72 hr patch  sertraline (ZOLOFT) 50 MG tablet  vitamin B-12 (CYANOCOBALAMIN) 1000 MCG CR tablet         ALLERGIES:  Allergies   Allergen Reactions    Morphine Unknown    Theophylline Dizziness and Palpitations     Annotation: Likely a toxicity issue         FAMILY HISTORY:  Family History   Problem Relation Age of Onset    Hypertension Mother     Depression  Mother     Hyperlipidemia Mother     Other - See Comments Mother         Back problems - spinal stenosis. Multiple surgeries. Bronchiectasis. Lichen sclerosis    Osteoarthritis Mother     Kidney Disease Mother         Long term Vioxx use    Osteoporosis Mother     Other Problems  (bronchiectasis) Mother     Hypertension Father     Hyperlipidemia Father     Arrhythmia Father         Atrial fibrillation, had ablation    Dementia Father         Vascular dementia    Osteoarthritis Father     Hypertension Sister     Anxiety Disorder Sister     Depression Sister     Diabetes Sister         Diagnosed around 2021    Obesity Sister     Anxiety Disorder Sister     Rheumatoid Arthritis Maternal Grandmother     Osteoporosis Maternal Grandmother     Heart Disease Maternal Grandfather     Coronary Artery Disease Maternal Grandfather     Other - See Comments Paternal Grandmother 102        Longevity    Kidney Disease Paternal Grandfather     Depression Brother         seasonal    Asthma Son         only during viral illnesses    Anxiety Disorder Son     No Known Problems Son     Breast Cancer Maternal Aunt 73    Endometriosis Maternal Aunt     Breast Cancer Maternal Aunt 78        maternal aunt, unilateral    Heart Disease Maternal Uncle 78    Prostate Cancer Paternal Uncle         Uncle    Prostate Cancer Paternal Uncle         Uncle    Prostate Cancer Paternal Uncle         Uncle    Parkinsonism Paternal Uncle     Thyroid Cancer Maternal Cousin     Breast Cancer Other         Maternal great aunt, 40's, bilateral    Prostate Cancer Other         Uncle    Prostate Cancer Other         Uncle    Prostate Cancer Other         Uncle    Ovarian Cancer No family hx of     Colon Cancer No family hx of        SOCIAL HISTORY:   Social History     Socioeconomic History    Marital status:      Spouse name: Newton Moore    Number of children: 2    Years of education: Bachelors degree   Occupational History    Occupation:     Tobacco Use    Smoking status: Never     Passive exposure: Never    Smokeless tobacco: Never   Vaping Use    Vaping status: Never Used   Substance and Sexual Activity    Alcohol use: Not Currently     Comment: Very occasional    Drug use: Yes     Types: Marijuana     Comment: Occasional THC beverages    Sexual activity: Yes     Partners: Male     Birth control/protection: Female Surgical     Comment: Tubal ligation 2016   Other Topics Concern    Parent/sibling w/ CABG, MI or angioplasty before 65F 55M? No   Social History Narrative    : Newton Caal  Kids: Luiz 2012, Leroy 2014.      Diet: Increasing protein, eats fruits and veggies.  Calcium + D - supplements.    Exercise: yoga, running, weights. Skiing.     Social Drivers of Health     Financial Resource Strain: Low Risk  (11/3/2024)    Financial Resource Strain     Within the past 12 months, have you or your family members you live with been unable to get utilities (heat, electricity) when it was really needed?: No   Food Insecurity: Low Risk  (11/3/2024)    Food Insecurity     Within the past 12 months, did you worry that your food would run out before you got money to buy more?: No     Within the past 12 months, did the food you bought just not last and you didn t have money to get more?: No   Transportation Needs: Low Risk  (11/3/2024)    Transportation Needs     Within the past 12 months, has lack of transportation kept you from medical appointments, getting your medicines, non-medical meetings or appointments, work, or from getting things that you need?: No   Physical Activity: Insufficiently Active (11/3/2024)    Exercise Vital Sign     Days of Exercise per Week: 3 days     Minutes of Exercise per Session: 30 min   Stress: No Stress Concern Present (11/3/2024)    [employer] - Occupational Stress Questionnaire     Feeling of Stress : Only a little   Social Connections: Unknown (11/3/2024)    Social Connection and  "Isolation Panel [NHANES]     Frequency of Social Gatherings with Friends and Family: Twice a week   Interpersonal Safety: Low Risk  (11/4/2024)    Interpersonal Safety     Do you feel physically and emotionally safe where you currently live?: Yes     Within the past 12 months, have you been hit, slapped, kicked or otherwise physically hurt by someone?: No     Within the past 12 months, have you been humiliated or emotionally abused in other ways by your partner or ex-partner?: No   Housing Stability: Low Risk  (11/3/2024)    Housing Stability     Do you have housing? : Yes     Are you worried about losing your housing?: No       VITALS:  /68   Pulse 99   Temp 98.5  F (36.9  C) (Oral)   Resp 18   Ht 1.676 m (5' 6\")   Wt 83.9 kg (185 lb)   LMP  (LMP Unknown)   SpO2 100%   BMI 29.86 kg/m      PHYSICAL EXAM    Physical Exam  Constitutional:       General: She is not in acute distress.  HENT:      Head: Normocephalic and atraumatic.      Right Ear: Tympanic membrane normal.      Left Ear: Tympanic membrane normal.      Mouth/Throat:      Pharynx: Oropharynx is clear.   Eyes:      Pupils: Pupils are equal, round, and reactive to light.   Cardiovascular:      Rate and Rhythm: Regular rhythm. Tachycardia present.      Pulses: Normal pulses.      Heart sounds: Normal heart sounds.   Pulmonary:      Effort: Pulmonary effort is normal.      Breath sounds: Normal breath sounds.   Abdominal:      General: Abdomen is flat. Bowel sounds are normal.      Palpations: Abdomen is soft.      Tenderness: There is no abdominal tenderness.   Musculoskeletal:         General: Normal range of motion.   Skin:     General: Skin is warm and dry.      Capillary Refill: Capillary refill takes less than 2 seconds.   Neurological:      General: No focal deficit present.      Mental Status: She is alert and oriented to person, place, and time.           LAB:  All pertinent labs reviewed and interpreted.  Labs Ordered and Resulted from " Time of ED Arrival to Time of ED Departure   D DIMER QUANTITATIVE - Abnormal       Result Value    D-Dimer Quantitative 0.86 (*)    COMPREHENSIVE METABOLIC PANEL - Abnormal    Sodium 136      Potassium 4.1      Carbon Dioxide (CO2) 21 (*)     Anion Gap 11      Urea Nitrogen 8.7      Creatinine 0.70      GFR Estimate >90      Calcium 8.9      Chloride 104      Glucose 111 (*)     Alkaline Phosphatase 62      AST 18      ALT 21      Protein Total 7.3      Albumin 4.2      Bilirubin Total 0.3     CBC WITH PLATELETS AND DIFFERENTIAL    WBC Count 8.1      RBC Count 4.29      Hemoglobin 12.1      Hematocrit 36.3      MCV 85      MCH 28.2      MCHC 33.3      RDW 13.2      Platelet Count 271      % Neutrophils 60      % Lymphocytes 32      % Monocytes 7      % Eosinophils 1      % Basophils 0      % Immature Granulocytes 0      NRBCs per 100 WBC 0      Absolute Neutrophils 4.8      Absolute Lymphocytes 2.6      Absolute Monocytes 0.6      Absolute Eosinophils 0.1      Absolute Basophils 0.0      Absolute Immature Granulocytes 0.0      Absolute NRBCs 0.0         RADIOLOGY:  Reviewed all pertinent imaging. Please see official radiology report.  CT Chest Pulmonary Embolism w Contrast   Final Result   IMPRESSION:      1.  No findings to explain symptoms.      2.  No pulmonary embolism.      3.  Hepatic steatosis.               I, Don Beyer, am serving as a scribe to document services personally performed by Dr. Yenni Marley based on my observation and the provider's statements to me. IYenni, DO attest that Don Beyer is acting in a scribe capacity, has observed my performance of the services and has documented them in accordance with my direction.    Yenni Marley DO  Emergency Medicine  [facility] EMERGENCY ROOM  [address] [id number] [phone]  Dept: [phone]       Yenni Marley DO  01/03/25 9865

## 2025-01-03 NOTE — ED TRIAGE NOTES
Patient presents to the ED via walking for evaluation of hemoptysis. Patient reports that she has been dealing with viral illness for week and noticed blood in sputum once. Patient denies chest pain or shortness of breath. Also denies fevers at home and has history of asthma.

## 2025-01-03 NOTE — DISCHARGE INSTRUCTIONS
Signs of blood clots in the lungs, pneumonia or serious infection  Likely viral upper respiratory tract infection causing inflammation some blood in your cough  Continue over-the-counter therapies to help with your cough such as guanfacine, dextromethorphan  Tessalon Perles as needed for cough  Rest, fluid hydrate  Follow-up closely with your doctor, return if any acute worsening of symptoms

## 2025-02-19 ENCOUNTER — MYC MEDICAL ADVICE (OUTPATIENT)
Dept: FAMILY MEDICINE | Facility: CLINIC | Age: 45
End: 2025-02-19
Payer: COMMERCIAL

## 2025-07-06 DIAGNOSIS — F33.0 MILD EPISODE OF RECURRENT MAJOR DEPRESSIVE DISORDER: ICD-10-CM

## 2025-07-06 DIAGNOSIS — F41.1 GENERALIZED ANXIETY DISORDER: ICD-10-CM

## 2025-07-06 DIAGNOSIS — J45.20 MILD INTERMITTENT ASTHMA WITHOUT COMPLICATION: ICD-10-CM

## 2025-07-07 RX ORDER — FLUTICASONE FUROATE 200 UG/1
1 POWDER RESPIRATORY (INHALATION) DAILY
Qty: 3 EACH | Refills: 4 | Status: SHIPPED | OUTPATIENT
Start: 2025-07-07

## 2025-07-07 NOTE — TELEPHONE ENCOUNTER
Physical with pap due in November.     No future appointments.   Health Maintenance Due   Topic Date Due    COLORECTAL CANCER SCREENING  Never done    HEPATITIS C SCREENING  Never done    HPV VACCINE (2 - 3-dose SCDM series) 05/19/2008    PHQ-9  01/30/2025    ASTHMA CONTROL TEST  05/04/2025    ASTHMA ACTION PLAN  07/30/2025    HPV TEST  09/24/2025    PAP  09/24/2025    ANNUAL REVIEW OF HM ORDERS  11/04/2025     BP Readings from Last 3 Encounters:   01/03/25 134/76   11/04/24 136/80   10/19/24 130/87     Sandhya was seen today for medication refill.    Diagnoses and all orders for this visit:    Mild intermittent asthma without complication  -     fluticasone (ARNUITY ELLIPTA) 200 MCG/ACT inhaler; INHALE ONE PUFF BY MOUTH ONCE DAILY    Generalized anxiety disorder  -     sertraline (ZOLOFT) 50 MG tablet; TAKE THREE TABLETS BY MOUTH ONCE DAILY    Mild episode of recurrent major depressive disorder  -     sertraline (ZOLOFT) 50 MG tablet; TAKE THREE TABLETS BY MOUTH ONCE DAILY    Other orders  -     PRIMARY CARE FOLLOW-UP SCHEDULING; Future